# Patient Record
Sex: MALE | Race: WHITE | Employment: OTHER | URBAN - METROPOLITAN AREA
[De-identification: names, ages, dates, MRNs, and addresses within clinical notes are randomized per-mention and may not be internally consistent; named-entity substitution may affect disease eponyms.]

---

## 2017-03-24 ENCOUNTER — HOSPITAL ENCOUNTER (EMERGENCY)
Age: 32
Discharge: HOME | End: 2017-03-24
Attending: EMERGENCY MEDICINE | Admitting: EMERGENCY MEDICINE
Payer: SELF-PAY

## 2017-03-24 VITALS
WEIGHT: 170 LBS | HEART RATE: 75 BPM | OXYGEN SATURATION: 97 % | HEIGHT: 67 IN | BODY MASS INDEX: 26.68 KG/M2 | DIASTOLIC BLOOD PRESSURE: 81 MMHG | RESPIRATION RATE: 20 BRPM | SYSTOLIC BLOOD PRESSURE: 112 MMHG | TEMPERATURE: 99.8 F

## 2017-03-24 DIAGNOSIS — F19.91 HISTORY OF RECREATIONAL DRUG USE: ICD-10-CM

## 2017-03-24 DIAGNOSIS — T40.1X4A: Primary | ICD-10-CM

## 2017-03-24 LAB
ALBUMIN SERPL BCP-MCNC: 3.6 G/DL (ref 3.4–5)
ALBUMIN/GLOB SERPL: 1.1 {RATIO} (ref 0.8–1.7)
ALP SERPL-CCNC: 61 U/L (ref 45–117)
ALT SERPL-CCNC: 90 U/L (ref 16–61)
ANION GAP BLD CALC-SCNC: 10 MMOL/L (ref 3–18)
APAP SERPL-MCNC: <2 UG/ML (ref 10–30)
AST SERPL W P-5'-P-CCNC: 38 U/L (ref 15–37)
BASOPHILS # BLD AUTO: 0 K/UL (ref 0–0.06)
BASOPHILS # BLD: 0 % (ref 0–2)
BILIRUB SERPL-MCNC: 0.4 MG/DL (ref 0.2–1)
BUN SERPL-MCNC: 17 MG/DL (ref 7–18)
BUN/CREAT SERPL: 17 (ref 12–20)
CALCIUM SERPL-MCNC: 8.2 MG/DL (ref 8.5–10.1)
CHLORIDE SERPL-SCNC: 106 MMOL/L (ref 100–108)
CK MB CFR SERPL CALC: NORMAL % (ref 0–4)
CK MB SERPL-MCNC: <1 NG/ML (ref 5–25)
CK SERPL-CCNC: 78 U/L (ref 39–308)
CO2 SERPL-SCNC: 27 MMOL/L (ref 21–32)
CREAT SERPL-MCNC: 1 MG/DL (ref 0.6–1.3)
DIFFERENTIAL METHOD BLD: ABNORMAL
EOSINOPHIL # BLD: 0.1 K/UL (ref 0–0.4)
EOSINOPHIL NFR BLD: 1 % (ref 0–5)
ERYTHROCYTE [DISTWIDTH] IN BLOOD BY AUTOMATED COUNT: 12.7 % (ref 11.6–14.5)
ETHANOL SERPL-MCNC: <3 MG/DL (ref 0–3)
GLOBULIN SER CALC-MCNC: 3.4 G/DL (ref 2–4)
GLUCOSE SERPL-MCNC: 102 MG/DL (ref 74–99)
HCT VFR BLD AUTO: 37 % (ref 36–48)
HGB BLD-MCNC: 12.5 G/DL (ref 13–16)
LIPASE SERPL-CCNC: 76 U/L (ref 73–393)
LYMPHOCYTES # BLD AUTO: 32 % (ref 21–52)
LYMPHOCYTES # BLD: 1.3 K/UL (ref 0.9–3.6)
MCH RBC QN AUTO: 28.7 PG (ref 24–34)
MCHC RBC AUTO-ENTMCNC: 33.8 G/DL (ref 31–37)
MCV RBC AUTO: 85.1 FL (ref 74–97)
MONOCYTES # BLD: 0.4 K/UL (ref 0.05–1.2)
MONOCYTES NFR BLD AUTO: 9 % (ref 3–10)
NEUTS SEG # BLD: 2.4 K/UL (ref 1.8–8)
NEUTS SEG NFR BLD AUTO: 58 % (ref 40–73)
PLATELET # BLD AUTO: 181 K/UL (ref 135–420)
PMV BLD AUTO: 10 FL (ref 9.2–11.8)
POTASSIUM SERPL-SCNC: 3.5 MMOL/L (ref 3.5–5.5)
PROT SERPL-MCNC: 7 G/DL (ref 6.4–8.2)
RBC # BLD AUTO: 4.35 M/UL (ref 4.7–5.5)
SALICYLATES SERPL-MCNC: <2.8 MG/DL (ref 2.8–20)
SODIUM SERPL-SCNC: 143 MMOL/L (ref 136–145)
TROPONIN I SERPL-MCNC: <0.02 NG/ML (ref 0–0.06)
WBC # BLD AUTO: 4.2 K/UL (ref 4.6–13.2)

## 2017-03-24 PROCEDURE — 82550 ASSAY OF CK (CPK): CPT | Performed by: PHYSICIAN ASSISTANT

## 2017-03-24 PROCEDURE — 85025 COMPLETE CBC W/AUTO DIFF WBC: CPT | Performed by: PHYSICIAN ASSISTANT

## 2017-03-24 PROCEDURE — 80307 DRUG TEST PRSMV CHEM ANLYZR: CPT | Performed by: PHYSICIAN ASSISTANT

## 2017-03-24 PROCEDURE — 96374 THER/PROPH/DIAG INJ IV PUSH: CPT

## 2017-03-24 PROCEDURE — 80053 COMPREHEN METABOLIC PANEL: CPT | Performed by: PHYSICIAN ASSISTANT

## 2017-03-24 PROCEDURE — 74011250636 HC RX REV CODE- 250/636: Performed by: PHYSICIAN ASSISTANT

## 2017-03-24 PROCEDURE — 96361 HYDRATE IV INFUSION ADD-ON: CPT

## 2017-03-24 PROCEDURE — 93005 ELECTROCARDIOGRAM TRACING: CPT

## 2017-03-24 PROCEDURE — 99285 EMERGENCY DEPT VISIT HI MDM: CPT

## 2017-03-24 PROCEDURE — 83690 ASSAY OF LIPASE: CPT | Performed by: PHYSICIAN ASSISTANT

## 2017-03-24 RX ORDER — KETOROLAC TROMETHAMINE 30 MG/ML
30 INJECTION, SOLUTION INTRAMUSCULAR; INTRAVENOUS
Status: COMPLETED | OUTPATIENT
Start: 2017-03-24 | End: 2017-03-24

## 2017-03-24 RX ADMIN — KETOROLAC TROMETHAMINE 30 MG: 30 INJECTION, SOLUTION INTRAMUSCULAR at 18:54

## 2017-03-24 RX ADMIN — SODIUM CHLORIDE 1000 ML: 900 INJECTION, SOLUTION INTRAVENOUS at 17:40

## 2017-03-24 RX ADMIN — SODIUM CHLORIDE 1000 ML: 900 INJECTION, SOLUTION INTRAVENOUS at 18:54

## 2017-03-24 NOTE — ED NOTES
Received report and assumed care of patient. Pt resting in bed,  at bedside and remains on monitor x3.
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
## 2017-03-24 NOTE — DISCHARGE INSTRUCTIONS
Alcohol, Drug, or Poison Ingestion: Care Instructions  Your Care Instructions  A person can become very sick, or die, from swallowing or using alcohol, drugs, or poisons. Alcohol poisoning occurs when a person drinks a large amount of alcohol. Alcohol can stop nerve signals that control breathing. It can also stop the gag reflex that prevents choking. Alcohol poisoning is serious. It can lead to brain damage or death if it's not treated right away. Drugs can be used by accident or on purpose. They can be swallowed, inhaled, injected, or absorbed through the skin. Drugs include over-the-counter medicine (such as aspirin or acetaminophen) and prescription medicine. They also include vitamins and supplements. And they include illegal drugs such as cocaine and heroin. And poisons are all around us. They include household , cosmetics, houseplants, and garden chemicals. The doctor has checked you carefully, but problems can develop later. If you notice any problems or new symptoms, get medical treatment right away. Follow-up care is a key part of your treatment and safety. Be sure to make and go to all appointments, and call your doctor if you are having problems. It's also a good idea to know your test results and keep a list of the medicines you take. How can you care for yourself at home? Alcohol problems  · Talk to your doctor or counselor about programs that can help you stop using alcohol. · Plan ways to avoid being tempted to drink. ¨ Get rid of all alcohol in your home. ¨ Avoid places where you tend to drink. ¨ Stay away from places or events that offer alcohol. ¨ Stay away from people who drink a lot. Drug problems  · Talk to your doctor about programs that can help you stop using drugs. · Get rid of any drugs you might be tempted to misuse. · Learn how to say no when other people use drugs. · Don't spend time with people who use drugs.   Poison prevention  · Keep products in the containers they came in. Keep them with the original labels. · Be careful when you use cleaning products, paints, solvents, and pesticides. Read labels before use. Use a fan to move strong odors and fumes out of your home. · Do not mix cleaning products. Try to use nontoxic . These include vinegar, lemon juice, and baking soda. When should you call for help? Poison control centers, hospitals, or your doctor can give immediate advice in the case of a poisoning. The [employer] number is [phone]. Have the poison container with you so you can give complete information to the poison control center, such as what the poison or substance is, how much was taken and when. Do not try to make the person vomit. Call 911 anytime you think you may need emergency care. For example, call if you or someone else:  · Has used or currently uses alcohol or drugs and is very confused or can't stay awake. · Has passed out (lost consciousness). · Has severe trouble breathing. · Is having a seizure. Call your doctor now or seek immediate medical care if you or someone else:  · Has new symptoms, or is not acting normally. Watch closely for changes in your health, and be sure to contact your doctor if:  · You do not get better as expected. · You need help with drug or alcohol problems. · You have problems with depression or other mental health issues. Where can you learn more? Go to http://rosemary-yasmin.info/. Enter S160 in the search box to learn more about \"Alcohol, Drug, or Poison Ingestion: Care Instructions. \"  Current as of: May 27, 2016  Content Version: 11.1  © 1173-8970 [employer]. Care instructions adapted under license by [employer] (which disclaims liability or warranty for this information).  If you have questions about a medical condition or this instruction, always ask your healthcare professional. [employer] disclaims any warranty or liability for your use of this information.

## 2017-03-25 NOTE — ED NOTES
Claritza Michel was discharged in good and improved condition. The patient's diagnosis, condition and treatment were explained to patient and aftercare instructions were given. The patient verbalized good understanding. Patient armband removed and both labels and armband were placed in shred bin. Patient left ER ambulatory with steady gait in no acute distress. 0 prescriptions provided. Patient discharged to officer's custody.

## 2017-04-02 LAB
ATRIAL RATE: 73 BPM
CALCULATED P AXIS, ECG09: 75 DEGREES
CALCULATED R AXIS, ECG10: 56 DEGREES
CALCULATED T AXIS, ECG11: 48 DEGREES
DIAGNOSIS, 93000: NORMAL
P-R INTERVAL, ECG05: 156 MS
Q-T INTERVAL, ECG07: 384 MS
QRS DURATION, ECG06: 84 MS
QTC CALCULATION (BEZET), ECG08: 423 MS
VENTRICULAR RATE, ECG03: 73 BPM

## 2017-12-28 ENCOUNTER — HOSPITAL ENCOUNTER (EMERGENCY)
Age: 32
Discharge: HOME | End: 2017-12-28
Attending: EMERGENCY MEDICINE | Admitting: EMERGENCY MEDICINE
Payer: SELF-PAY

## 2017-12-28 ENCOUNTER — APPOINTMENT (OUTPATIENT)
Dept: GENERAL RADIOLOGY | Age: 32
End: 2017-12-28
Attending: EMERGENCY MEDICINE
Payer: SELF-PAY

## 2017-12-28 VITALS
WEIGHT: 175 LBS | DIASTOLIC BLOOD PRESSURE: 77 MMHG | TEMPERATURE: 98.3 F | OXYGEN SATURATION: 99 % | SYSTOLIC BLOOD PRESSURE: 124 MMHG | BODY MASS INDEX: 26.52 KG/M2 | HEART RATE: 88 BPM | RESPIRATION RATE: 18 BRPM | HEIGHT: 68 IN

## 2017-12-28 DIAGNOSIS — F14.10 COCAINE ABUSE (HCC): ICD-10-CM

## 2017-12-28 DIAGNOSIS — R07.9 CHEST PAIN, UNSPECIFIED TYPE: Primary | ICD-10-CM

## 2017-12-28 LAB
ALBUMIN SERPL-MCNC: 4.4 G/DL (ref 3.4–5)
ALBUMIN/GLOB SERPL: 1 {RATIO} (ref 0.8–1.7)
ALP SERPL-CCNC: 79 U/L (ref 45–117)
ALT SERPL-CCNC: 169 U/L (ref 16–61)
ANION GAP SERPL CALC-SCNC: 8 MMOL/L (ref 3–18)
AST SERPL-CCNC: 63 U/L (ref 15–37)
BASOPHILS # BLD: 0 K/UL (ref 0–0.06)
BASOPHILS NFR BLD: 0 % (ref 0–2)
BILIRUB SERPL-MCNC: 1.8 MG/DL (ref 0.2–1)
BUN SERPL-MCNC: 12 MG/DL (ref 7–18)
BUN/CREAT SERPL: 11 (ref 12–20)
CALCIUM SERPL-MCNC: 9.8 MG/DL (ref 8.5–10.1)
CHLORIDE SERPL-SCNC: 101 MMOL/L (ref 100–108)
CK MB CFR SERPL CALC: NORMAL % (ref 0–4)
CK MB SERPL-MCNC: <1 NG/ML (ref 5–25)
CK SERPL-CCNC: 131 U/L (ref 39–308)
CO2 SERPL-SCNC: 29 MMOL/L (ref 21–32)
CREAT SERPL-MCNC: 1.06 MG/DL (ref 0.6–1.3)
DIFFERENTIAL METHOD BLD: ABNORMAL
EOSINOPHIL # BLD: 0.1 K/UL (ref 0–0.4)
EOSINOPHIL NFR BLD: 1 % (ref 0–5)
ERYTHROCYTE [DISTWIDTH] IN BLOOD BY AUTOMATED COUNT: 13 % (ref 11.6–14.5)
GLOBULIN SER CALC-MCNC: 4.2 G/DL (ref 2–4)
GLUCOSE SERPL-MCNC: 115 MG/DL (ref 74–99)
HCT VFR BLD AUTO: 51.5 % (ref 36–48)
HGB BLD-MCNC: 18.1 G/DL (ref 13–16)
LYMPHOCYTES # BLD: 1.4 K/UL (ref 0.9–3.6)
LYMPHOCYTES NFR BLD: 25 % (ref 21–52)
MCH RBC QN AUTO: 30.6 PG (ref 24–34)
MCHC RBC AUTO-ENTMCNC: 35.1 G/DL (ref 31–37)
MCV RBC AUTO: 87 FL (ref 74–97)
MONOCYTES # BLD: 0.6 K/UL (ref 0.05–1.2)
MONOCYTES NFR BLD: 11 % (ref 3–10)
NEUTS SEG # BLD: 3.4 K/UL (ref 1.8–8)
NEUTS SEG NFR BLD: 63 % (ref 40–73)
PLATELET # BLD AUTO: 173 K/UL (ref 135–420)
PMV BLD AUTO: 10.6 FL (ref 9.2–11.8)
POTASSIUM SERPL-SCNC: 4.2 MMOL/L (ref 3.5–5.5)
PROT SERPL-MCNC: 8.6 G/DL (ref 6.4–8.2)
RBC # BLD AUTO: 5.92 M/UL (ref 4.7–5.5)
SODIUM SERPL-SCNC: 138 MMOL/L (ref 136–145)
TROPONIN I SERPL-MCNC: <0.02 NG/ML (ref 0–0.06)
WBC # BLD AUTO: 5.5 K/UL (ref 4.6–13.2)

## 2017-12-28 PROCEDURE — 80053 COMPREHEN METABOLIC PANEL: CPT | Performed by: EMERGENCY MEDICINE

## 2017-12-28 PROCEDURE — 85025 COMPLETE CBC W/AUTO DIFF WBC: CPT | Performed by: EMERGENCY MEDICINE

## 2017-12-28 PROCEDURE — 71010 XR CHEST PORT: CPT

## 2017-12-28 PROCEDURE — 94762 N-INVAS EAR/PLS OXIMTRY CONT: CPT

## 2017-12-28 PROCEDURE — 93005 ELECTROCARDIOGRAM TRACING: CPT

## 2017-12-28 PROCEDURE — 82550 ASSAY OF CK (CPK): CPT | Performed by: EMERGENCY MEDICINE

## 2017-12-28 PROCEDURE — 99284 EMERGENCY DEPT VISIT MOD MDM: CPT

## 2017-12-28 RX ORDER — KETOROLAC TROMETHAMINE 30 MG/ML
30 INJECTION, SOLUTION INTRAMUSCULAR; INTRAVENOUS ONCE
Status: DISCONTINUED | OUTPATIENT
Start: 2017-12-28 | End: 2017-12-28

## 2017-12-28 NOTE — ED TRIAGE NOTES
Patient c/o of chest pain starting today. Patient reports using cocaine last night. Patient reports having been in rehab for heroin use.

## 2017-12-28 NOTE — ED PROVIDER NOTES
EMERGENCY DEPARTMENT HISTORY AND PHYSICAL EXAM    Date: 12/28/2017  Patient Name: Lana Michel    History of Presenting Illness     Chief Complaint   Patient presents with    Chest Pain         History Provided By: Patient    Chief Complaint: chest pain  Duration: 2 months, with acuity last night   Timing:  Intermittent  Location: chest  Quality: Sharp  Severity: 5 out of 10   The pain is worse with deep breathing, and worse in mornings  Associated Symptoms: heightened anxiety, and flank pain after drinking instant coffee    Additional History (Context):   3:35 PM   Lana Michel is a 33 y.o. male with PMHX of cocaine use last night, and chronic musculoskeletal chest pains who presents to the emergency department C/O intermittent sharp chest pain (5/10) that lasted for 2 hours, and has come back stronger (onset 2 months ago, with acuity last night). The pain is worse with deep breathing, and worse in the morning. Associated sxs include heightened anxiety, and flank pain after drinking instant coffee. The pt admits to previous Cocaine use (last night), and previous Heroin use (7 months ago). The smokes Cigarettes, and uses etOH. Pt denies having previously seen a doctor for the chest pain, urinary sx, and any other sxs or complaints. PCP: Mee Esquivel MD        Past History     Past Medical History:  Past Medical History:   Diagnosis Date    Drug abuse, IV     Psychiatric diagnosis     ADHD    Psychiatric diagnosis     drug abuse.  Psychiatric disorder     anxiety       Past Surgical History:  History reviewed. No pertinent surgical history. Family History:  History reviewed. No pertinent family history.     Social History:  Social History   Substance Use Topics    Smoking status: Current Every Day Smoker     Packs/day: 1.00    Smokeless tobacco: None    Alcohol use No      Comment: reports no drinking x 6 months       Allergies:  No Known Allergies      Review of Systems   Review of Systems   Cardiovascular: Positive for chest pain. Genitourinary: Positive for flank pain (after instant coffee). Negative for dysuria, frequency and urgency. Psychiatric/Behavioral: The patient is nervous/anxious. All other systems reviewed and are negative. Physical Exam     Vitals:    12/28/17 1452   BP: 124/77   Pulse: 88   Resp: 18   Temp: 98.3 °F (36.8 °C)   SpO2: 99%   Weight: 79.4 kg (175 lb)   Height: 5' 8\" (1.727 m)     Physical Exam   Nursing note and vitals reviewed. Constitutional: Alert. Well appearing, no acute distress  Head: Normocephalic, Atraumatic  Eyes: Pupils are equal, round, and reactive to light, EOMI  ENT: Moist mucous membranes, oropharynx clear. Neck: Supple, non-tender  Cardiovascular: Regular rate and rhythm, no murmurs, rubs, or gallops  Chest: Normal work of breathing and chest excursion bilaterally. No reproducible chest tenderness. Lungs: Clear to ausculation bilaterally. Abdomen: Soft, non tender, non distended, normoactive bowel sounds  Back: No evidence of trauma or deformity. No CVA Tenderness.   Extremities: No evidence of trauma or deformity, no LE edema  Skin: Warm and dry  Neuro: Alert and appropriate, facial movement symmetric, normal speech, strength and sensation full and symmetric bilaterally, normal gait, normal coordination  Psychiatric: Moderately anxious appearing     Diagnostic Study Results     Labs -     Recent Results (from the past 12 hour(s))   CBC WITH AUTOMATED DIFF    Collection Time: 12/28/17  2:54 PM   Result Value Ref Range    WBC 5.5 4.6 - 13.2 K/uL    RBC 5.92 (H) 4.70 - 5.50 M/uL    HGB 18.1 (H) 13.0 - 16.0 g/dL    HCT 51.5 (H) 36.0 - 48.0 %    MCV 87.0 74.0 - 97.0 FL    MCH 30.6 24.0 - 34.0 PG    MCHC 35.1 31.0 - 37.0 g/dL    RDW 13.0 11.6 - 14.5 %    PLATELET 200 173 - 497 K/uL    MPV 10.6 9.2 - 11.8 FL    NEUTROPHILS 63 40 - 73 %    LYMPHOCYTES 25 21 - 52 %    MONOCYTES 11 (H) 3 - 10 %    EOSINOPHILS 1 0 - 5 % BASOPHILS 0 0 - 2 %    ABS. NEUTROPHILS 3.4 1.8 - 8.0 K/UL    ABS. LYMPHOCYTES 1.4 0.9 - 3.6 K/UL    ABS. MONOCYTES 0.6 0.05 - 1.2 K/UL    ABS. EOSINOPHILS 0.1 0.0 - 0.4 K/UL    ABS. BASOPHILS 0.0 0.0 - 0.06 K/UL    DF AUTOMATED     METABOLIC PANEL, COMPREHENSIVE    Collection Time: 12/28/17  2:54 PM   Result Value Ref Range    Sodium 138 136 - 145 mmol/L    Potassium 4.2 3.5 - 5.5 mmol/L    Chloride 101 100 - 108 mmol/L    CO2 29 21 - 32 mmol/L    Anion gap 8 3.0 - 18 mmol/L    Glucose 115 (H) 74 - 99 mg/dL    BUN 12 7.0 - 18 MG/DL    Creatinine 1.06 0.6 - 1.3 MG/DL    BUN/Creatinine ratio 11 (L) 12 - 20      GFR est AA >60 >60 ml/min/1.73m2    GFR est non-AA >60 >60 ml/min/1.73m2    Calcium 9.8 8.5 - 10.1 MG/DL    Bilirubin, total 1.8 (H) 0.2 - 1.0 MG/DL    ALT (SGPT) 169 (H) 16 - 61 U/L    AST (SGOT) 63 (H) 15 - 37 U/L    Alk. phosphatase 79 45 - 117 U/L    Protein, total 8.6 (H) 6.4 - 8.2 g/dL    Albumin 4.4 3.4 - 5.0 g/dL    Globulin 4.2 (H) 2.0 - 4.0 g/dL    A-G Ratio 1.0 0.8 - 1.7     CARDIAC PANEL,(CK, CKMB & TROPONIN)    Collection Time: 12/28/17  2:54 PM   Result Value Ref Range     39 - 308 U/L    CK - MB <1.0 <3.6 ng/ml    CK-MB Index  0.0 - 4.0 %     CALCULATION NOT PERFORMED WHEN RESULT IS BELOW LINEAR LIMIT    Troponin-I, Qt. <0.02 0.00 - 0.06 NG/ML       Radiologic Studies -   XR CHEST PORT   Final Result        CT Results  (Last 48 hours)    None        CXR Results  (Last 48 hours)               12/28/17 1522  XR CHEST PORT Final result    Impression:  IMPRESSION:       1. No acute cardiopulmonary process. Narrative:  Portable Chest         History: chest pain       Comparison: No prior studies       Portable view of the chest demonstrates the cardiomediastinal silhouette is   within normal limits. Cardiac monitoring leads are present. There is no focal   consolidation, pleural effusion, or pneumothorax. Osseous structures are   unremarkable. Medications given in the ED-  Medications   ketorolac (TORADOL) injection 30 mg (not administered)         Medical Decision Making   I am the first provider for this patient. I reviewed the vital signs, available nursing notes, past medical history, past surgical history, family history and social history. Vital Signs-Reviewed the patient's vital signs. Pulse Oximetry Analysis - 99% on room air     Cardiac Monitor:  Rate: 88 bpm  Rhythm: NSR    NSR at 73 bpm. No ST segment change or T wave inversion. Normal intervals. Read at 14:54        Records Reviewed: Nursing Notes and Old Medical Records    Provider Notes (Medical Decision Making): Discussion: 33year old male presents for acute exacerbation of chronic chest pain in the setting of cocaine abuse yesterday. His vital signs are stable, EKG is non ischemic with no evidence of arrhythmia and labs are reassuring of no acute process. Will provide instructions for musculoskeletal chest pain management, and encourage follow-up with [facility] clinic to set up a PCP as well as [facility] for help with Psychiatric management. Return precautions provided. Procedures:  Procedures    ED Course:   3:35 PM    Initial assessment performed. The patients presenting problems have been discussed, and they are in agreement with the care plan formulated and outlined with them. I have encouraged them to ask questions as they arise throughout their visit. Diagnosis and Disposition       DISCHARGE NOTE:  4:59 PM   Daquan Michel's  results have been reviewed with him. He has been counseled regarding his diagnosis, treatment, and plan. He verbally conveys understanding and agreement of the signs, symptoms, diagnosis, treatment and prognosis and additionally agrees to follow up as discussed. He also agrees with the care-plan and conveys that all of his questions have been answered.   I have also provided discharge instructions for him that include: educational information regarding their diagnosis and treatment, and list of reasons why they would want to return to the ED prior to their follow-up appointment, should his condition change. He has been provided with education for proper emergency department utilization. CLINICAL IMPRESSION:    1. Chest pain, unspecified type    2. Cocaine abuse        PLAN:  1. D/C Home  2. There are no discharge medications for this patient. 3.   Follow-up Information     Follow up With Details Comments Contact Info    [facility] Schedule an appointment as soon as possible for a visit in 2 days ED Follow-up with the [facility] as discussed with Dr. Javi Cheema  [address] [facility], [address] [address] [address] Call Call and Make an appointment with CAMIL[address], [address] [place] [address]    THE [facility] EMERGENCY DEPT Go to As needed, If symptoms worsen [address] [facility] [address]  [phone]        _______________________________    Attestations: This note is prepared by Rosa Martinez, acting as Scribe for Renae Dillard M.D.:  The scribe's documentation has been prepared under my direction and personally reviewed by me in its entirety.   I confirm that the note above accurately reflects all work, treatment, procedures, and medical decision making performed by me.  _______________________________

## 2017-12-28 NOTE — ED NOTES
Patient given & reviewed discharge instructions. Patient return verbal understanding. Armband discarded appropriately.

## 2017-12-28 NOTE — DISCHARGE INSTRUCTIONS
Chest Pain: Care Instructions  Your Care Instructions    There are many things that can cause chest pain. Some are not serious and will get better on their own in a few days. But some kinds of chest pain need more testing and treatment. Your doctor may have recommended a follow-up visit in the next 8 to 12 hours. If you are not getting better, you may need more tests or treatment. Even though your doctor has released you, you still need to watch for any problems. The doctor carefully checked you, but sometimes problems can develop later. If you have new symptoms or if your symptoms do not get better, get medical care right away. If you have worse or different chest pain or pressure that lasts more than 5 minutes or you passed out (lost consciousness), call 911 or seek other emergency help right away. A medical visit is only one step in your treatment. Even if you feel better, you still need to do what your doctor recommends, such as going to all suggested follow-up appointments and taking medicines exactly as directed. This will help you recover and help prevent future problems. How can you care for yourself at home? · Rest until you feel better. · Take your medicine exactly as prescribed. Call your doctor if you think you are having a problem with your medicine. · Do not drive after taking a prescription pain medicine. When should you call for help? Call 911 if:  ? · You passed out (lost consciousness). ? · You have severe difficulty breathing. ? · You have symptoms of a heart attack. These may include:  ¨ Chest pain or pressure, or a strange feeling in your chest.  ¨ Sweating. ¨ Shortness of breath. ¨ Nausea or vomiting. ¨ Pain, pressure, or a strange feeling in your back, neck, jaw, or upper belly or in one or both shoulders or arms. ¨ Lightheadedness or sudden weakness. ¨ A fast or irregular heartbeat.   After you call 911, the  may tell you to chew 1 adult-strength or 2 to 4 low-dose aspirin. Wait for an ambulance. Do not try to drive yourself. ?Call your doctor today if:  ? · You have any trouble breathing. ? · Your chest pain gets worse. ? · You are dizzy or lightheaded, or you feel like you may faint. ? · You are not getting better as expected. ? · You are having new or different chest pain. Where can you learn more? Go to http://rosemary-yasmin.info/. Enter A120 in the search box to learn more about \"Chest Pain: Care Instructions. \"  Current as of: March 20, 2017  Content Version: 11.4  © 4076-7377 [email or web address]. Care instructions adapted under license by [employer] (which disclaims liability or warranty for this information). If you have questions about a medical condition or this instruction, always ask your healthcare professional. [address] any warranty or liability for your use of this information.

## 2017-12-31 LAB
ATRIAL RATE: 73 BPM
CALCULATED P AXIS, ECG09: 10 DEGREES
CALCULATED R AXIS, ECG10: 84 DEGREES
CALCULATED T AXIS, ECG11: 60 DEGREES
DIAGNOSIS, 93000: NORMAL
P-R INTERVAL, ECG05: 132 MS
Q-T INTERVAL, ECG07: 380 MS
QRS DURATION, ECG06: 84 MS
QTC CALCULATION (BEZET), ECG08: 418 MS
VENTRICULAR RATE, ECG03: 73 BPM

## 2020-07-03 ENCOUNTER — HOSPITAL ENCOUNTER (EMERGENCY)
Age: 35
Discharge: LEFT BEFORE DISCHARGE | End: 2020-07-03
Attending: EMERGENCY MEDICINE
Payer: COMMERCIAL

## 2020-07-03 ENCOUNTER — APPOINTMENT (OUTPATIENT)
Dept: GENERAL RADIOLOGY | Age: 35
End: 2020-07-03
Attending: EMERGENCY MEDICINE
Payer: COMMERCIAL

## 2020-07-03 VITALS
DIASTOLIC BLOOD PRESSURE: 74 MMHG | HEIGHT: 68 IN | BODY MASS INDEX: 26.52 KG/M2 | TEMPERATURE: 98.4 F | HEART RATE: 100 BPM | WEIGHT: 175 LBS | OXYGEN SATURATION: 100 % | SYSTOLIC BLOOD PRESSURE: 138 MMHG | RESPIRATION RATE: 18 BRPM

## 2020-07-03 PROCEDURE — 75810000275 HC EMERGENCY DEPT VISIT NO LEVEL OF CARE

## 2020-07-03 NOTE — ED TRIAGE NOTES
Arrives ambulatory to the ed s/p fall on the stairs. Pt states he has had arm pain for the past three hours and feels a \"dent\" in his right forearm.

## 2020-08-03 ENCOUNTER — HOSPITAL ENCOUNTER (OUTPATIENT)
Dept: ULTRASOUND IMAGING | Age: 35
Discharge: HOME | End: 2020-08-03
Attending: INTERNAL MEDICINE
Payer: COMMERCIAL

## 2020-08-03 DIAGNOSIS — B18.2 HEPATITIS C CARRIER (HCC): ICD-10-CM

## 2020-08-03 PROCEDURE — 76981 USE PARENCHYMA: CPT

## 2020-11-09 ENCOUNTER — HOSPITAL ENCOUNTER (EMERGENCY)
Age: 35
Discharge: HOME | End: 2020-11-09
Attending: EMERGENCY MEDICINE
Payer: COMMERCIAL

## 2020-11-09 ENCOUNTER — APPOINTMENT (OUTPATIENT)
Dept: GENERAL RADIOLOGY | Age: 35
End: 2020-11-09
Attending: PHYSICIAN ASSISTANT
Payer: COMMERCIAL

## 2020-11-09 VITALS
WEIGHT: 165 LBS | HEART RATE: 63 BPM | BODY MASS INDEX: 25.01 KG/M2 | RESPIRATION RATE: 16 BRPM | TEMPERATURE: 97.8 F | SYSTOLIC BLOOD PRESSURE: 111 MMHG | DIASTOLIC BLOOD PRESSURE: 72 MMHG | HEIGHT: 68 IN | OXYGEN SATURATION: 100 %

## 2020-11-09 DIAGNOSIS — S61.210A LACERATION OF RIGHT INDEX FINGER WITHOUT FOREIGN BODY WITHOUT DAMAGE TO NAIL, INITIAL ENCOUNTER: Primary | ICD-10-CM

## 2020-11-09 PROCEDURE — 73140 X-RAY EXAM OF FINGER(S): CPT

## 2020-11-09 PROCEDURE — 77030002916 HC SUT ETHLN J&J -A

## 2020-11-09 PROCEDURE — 75810000293 HC SIMP/SUPERF WND  RPR

## 2020-11-09 PROCEDURE — 99283 EMERGENCY DEPT VISIT LOW MDM: CPT

## 2020-11-09 RX ORDER — DEXTROAMPHETAMINE SACCHARATE, AMPHETAMINE ASPARTATE MONOHYDRATE, DEXTROAMPHETAMINE SULFATE AND AMPHETAMINE SULFATE 5; 5; 5; 5 MG/1; MG/1; MG/1; MG/1
20 CAPSULE, EXTENDED RELEASE ORAL 3 TIMES DAILY
COMMUNITY

## 2020-11-09 NOTE — DISCHARGE INSTRUCTIONS

## 2020-11-09 NOTE — ED NOTES
Assumed care of patient. Patient currently soaking finger in sterile water. NAD. VSS. Bed low and locked. Call bell within reach. AOx4.

## 2020-11-09 NOTE — ED PROVIDER NOTES
EMERGENCY DEPARTMENT HISTORY AND PHYSICAL EXAM    Date: 11/9/2020  Patient Name: Cornelius Blum    History of Presenting Illness     Chief Complaint   Patient presents with    Laceration         History Provided By: Patient    Cornelius Blum is a 36 y.o. male with PMHX of ADHD, drug abuse, anxiety current cigarette smoker who presents to the emergency department C/O right index finger laceration. Reports just prior to arrival was using a  cutting drywall and sustained a laceration to his right index finger. This is up-to-date. Patient was brought back from triage brasher to main side ED room found to be very sweaty borderline hypotensive. Pt denies any other sxs or complaints. PCP: Roxie Pedro MD    Current Outpatient Medications   Medication Sig Dispense Refill    amphetamine-dextroamphetamine XR (Adderall XR) 20 mg XR capsule Take 20 mg by mouth three (3) times daily. Past History     Past Medical History:  Past Medical History:   Diagnosis Date    Drug abuse, IV ([address]     [facility]in [facility] [address] [facility]     Psychiatric diagnosis     ADHD    Psychiatric diagnosis     drug abuse.  Psychiatric disorder     anxiety       Past Surgical History:  History reviewed. No pertinent surgical history. Family History:  History reviewed. No pertinent family history. Social History:  Social History     Tobacco Use    Smoking status: Current Every Day Smoker     Packs/day: 1.00    Smokeless tobacco: Never Used   Substance Use Topics    Alcohol use: Yes     Comment: daily    Drug use: Yes     Types: Heroin, Prescription, Cocaine       Allergies:  No Known Allergies      Review of Systems   Review of Systems   Musculoskeletal: Positive for arthralgias. Skin: Positive for wound. All other systems reviewed and are negative.       Physical Exam     Vitals:    11/09/20 1434 11/09/20 1500 11/09/20 1508   BP: (!) 109/53 124/70    Pulse: 63     Resp: 16     Temp: 97.8 °F (36.6 °C)     SpO2: 100% 100% 100%   Weight: 74.8 kg (165 lb)     Height: 5' 8\" (1.727 m)       Physical Exam  Vitals signs and nursing note reviewed. Constitutional:       General: He is not in acute distress. Appearance: Normal appearance. He is normal weight. HENT:      Head: Normocephalic and atraumatic. Eyes:      Extraocular Movements: Extraocular movements intact. Conjunctiva/sclera: Conjunctivae normal.      Pupils: Pupils are equal, round, and reactive to light. Neck:      Musculoskeletal: Normal range of motion and neck supple. Musculoskeletal: Normal range of motion. General: Tenderness and signs of injury present. Right wrist: Normal.      Right hand: He exhibits laceration. He exhibits no bony tenderness and no deformity. Normal sensation noted. Normal strength noted. Hands:       Comments: 2 cm V-shaped laceration distal right index finger bleeding controlled, does not appear to be deep, full range of motion, neurovascular intact, no nail involvement   Skin:     General: Skin is warm and dry. Capillary Refill: Capillary refill takes less than 2 seconds. Neurological:      General: No focal deficit present. Mental Status: He is alert and oriented to person, place, and time. Psychiatric:         Mood and Affect: Mood normal.         Behavior: Behavior normal.           Diagnostic Study Results     Labs -   No results found for this or any previous visit (from the past 12 hour(s)). Radiologic Studies -   XR 2ND FINGER RT MIN 2 V   Final Result   IMPRESSION:      Soft tissue injury to the right hand index finger as described without evidence   of fracture or retained radiopaque foreign object. CT Results  (Last 48 hours)    None        CXR Results  (Last 48 hours)    None          Medications given in the ED-  Medications - No data to display      Medical Decision Making   I am the first provider for this patient.     I reviewed the vital signs, available nursing notes, past medical history, past surgical history, family history and social history. Vital Signs-Reviewed the patient's vital signs. Pulse Oximetry Analysis - 100% on RA     Records Reviewed: Nursing Notes    Procedures:  Wound Repair    Date/Time: 11/9/2020 3:22 PM  Performed by: PAPreparation: skin prepped with Shur-Clens and sterile field established  Pre-procedure re-eval: Immediately prior to the procedure, the patient was reevaluated and found suitable for the planned procedure and any planned medications. Time out: Immediately prior to the procedure a time out was called to verify the correct patient, procedure, equipment, staff and marking as appropriate. .  Location details: right index finger  Wound length:2.5 cm or less  Anesthesia: local infiltration    Anesthesia:  Local Anesthetic: lidocaine 2% without epinephrine  Foreign bodies: no foreign bodies  Debridement: none  Skin closure: 5-0 nylon  Number of sutures: 3  Technique: simple and interrupted  Patient tolerance: Patient tolerated the procedure well with no immediate complications  My total time at bedside, performing this procedure was 1-15 minutes. ED Course:   2:32 PM   Initial assessment performed. The patients presenting problems have been discussed, and they are in agreement with the care plan formulated and outlined with them. I have encouraged them to ask questions as they arise throughout their visit. Discussion: 36 y.o. male with uncomplicated right index finger laceration from a  while cutting drywall just prior to arrival.  Neurovascular intact appropriate vital signs negative x-rays. Wound cleansed repaired dressed. Wound care instructions given PCP follow-up for suture removal.  Return precautions discussed. Diagnosis and Disposition       DISCHARGE NOTE:  Rosemary Benoit  results have been reviewed with him.   He has been counseled regarding his diagnosis, treatment, and plan. He verbally conveys understanding and agreement of the signs, symptoms, diagnosis, treatment and prognosis and additionally agrees to follow up as discussed. He also agrees with the care-plan and conveys that all of his questions have been answered. I have also provided discharge instructions for him that include: educational information regarding their diagnosis and treatment, and list of reasons why they would want to return to the ED prior to their follow-up appointment, should his condition change. He has been provided with education for proper emergency department utilization. CLINICAL IMPRESSION:    1. Laceration of right index finger without foreign body without damage to nail, initial encounter        PLAN:  1. D/C Home  2. Current Discharge Medication List        3. Follow-up Information     Follow up With Specialties Details Why Contact Info    Riccardo Villa MD Internal Medicine In 9 days For suture removal [address] [facility] [address]      [facility] EMERGENCY DEPT Emergency Medicine  As needed, If symptoms worsen [address] [address] [phone]                  Please note that this dictation was completed with [employer], the computer voice recognition software. Quite often unanticipated grammatical, syntax, homophones, and other interpretive errors are inadvertently transcribed by the computer software. Please disregard these errors. Please excuse any errors that have escaped final proofreading.

## 2021-06-25 ENCOUNTER — APPOINTMENT (OUTPATIENT)
Dept: CT IMAGING | Age: 36
End: 2021-06-25
Attending: PHYSICIAN ASSISTANT
Payer: COMMERCIAL

## 2021-06-25 ENCOUNTER — HOSPITAL ENCOUNTER (EMERGENCY)
Age: 36
Discharge: HOME | End: 2021-06-25
Attending: EMERGENCY MEDICINE
Payer: COMMERCIAL

## 2021-06-25 VITALS
SYSTOLIC BLOOD PRESSURE: 145 MMHG | BODY MASS INDEX: 27.58 KG/M2 | HEIGHT: 68 IN | RESPIRATION RATE: 20 BRPM | OXYGEN SATURATION: 99 % | WEIGHT: 182 LBS | HEART RATE: 80 BPM | DIASTOLIC BLOOD PRESSURE: 110 MMHG | TEMPERATURE: 97.5 F

## 2021-06-25 DIAGNOSIS — R10.2 PELVIC AND PERINEAL PAIN: Primary | ICD-10-CM

## 2021-06-25 LAB
ANION GAP SERPL CALC-SCNC: 5 MMOL/L (ref 3–18)
APPEARANCE UR: CLEAR
BASOPHILS # BLD: 0 K/UL (ref 0–0.1)
BASOPHILS NFR BLD: 1 % (ref 0–2)
BILIRUB UR QL: NEGATIVE
BUN SERPL-MCNC: 14 MG/DL (ref 7–18)
BUN/CREAT SERPL: 18 (ref 12–20)
CALCIUM SERPL-MCNC: 8.7 MG/DL (ref 8.5–10.1)
CHLORIDE SERPL-SCNC: 106 MMOL/L (ref 100–111)
CO2 SERPL-SCNC: 29 MMOL/L (ref 21–32)
COLOR UR: YELLOW
CREAT SERPL-MCNC: 0.79 MG/DL (ref 0.6–1.3)
DIFFERENTIAL METHOD BLD: ABNORMAL
EOSINOPHIL # BLD: 0.1 K/UL (ref 0–0.4)
EOSINOPHIL NFR BLD: 2 % (ref 0–5)
ERYTHROCYTE [DISTWIDTH] IN BLOOD BY AUTOMATED COUNT: 10.9 % (ref 11.6–14.5)
GLUCOSE SERPL-MCNC: 96 MG/DL (ref 74–99)
GLUCOSE UR STRIP.AUTO-MCNC: NEGATIVE MG/DL
HCT VFR BLD AUTO: 44 % (ref 36–48)
HGB BLD-MCNC: 14.8 G/DL (ref 13–16)
HGB UR QL STRIP: NEGATIVE
KETONES UR QL STRIP.AUTO: NEGATIVE MG/DL
LEUKOCYTE ESTERASE UR QL STRIP.AUTO: NEGATIVE
LYMPHOCYTES # BLD: 1 K/UL (ref 0.9–3.6)
LYMPHOCYTES NFR BLD: 23 % (ref 21–52)
MCH RBC QN AUTO: 30.3 PG (ref 24–34)
MCHC RBC AUTO-ENTMCNC: 33.6 G/DL (ref 31–37)
MCV RBC AUTO: 90 FL (ref 74–97)
MONOCYTES # BLD: 0.4 K/UL (ref 0.05–1.2)
MONOCYTES NFR BLD: 10 % (ref 3–10)
NEUTS SEG # BLD: 2.8 K/UL (ref 1.8–8)
NEUTS SEG NFR BLD: 65 % (ref 40–73)
NITRITE UR QL STRIP.AUTO: NEGATIVE
PH UR STRIP: 8 [PH] (ref 5–8)
PLATELET # BLD AUTO: 180 K/UL (ref 135–420)
PMV BLD AUTO: 10.2 FL (ref 9.2–11.8)
POTASSIUM SERPL-SCNC: 4.3 MMOL/L (ref 3.5–5.5)
PROT UR STRIP-MCNC: NEGATIVE MG/DL
RBC # BLD AUTO: 4.89 M/UL (ref 4.35–5.65)
SODIUM SERPL-SCNC: 140 MMOL/L (ref 136–145)
SP GR UR REFRACTOMETRY: 1.02 (ref 1–1.03)
UROBILINOGEN UR QL STRIP.AUTO: 1 EU/DL (ref 0.2–1)
WBC # BLD AUTO: 4.4 K/UL (ref 4.6–13.2)

## 2021-06-25 PROCEDURE — 85025 COMPLETE CBC W/AUTO DIFF WBC: CPT

## 2021-06-25 PROCEDURE — 80048 BASIC METABOLIC PNL TOTAL CA: CPT

## 2021-06-25 PROCEDURE — 99283 EMERGENCY DEPT VISIT LOW MDM: CPT

## 2021-06-25 PROCEDURE — 81003 URINALYSIS AUTO W/O SCOPE: CPT

## 2021-06-25 PROCEDURE — 87591 N.GONORRHOEAE DNA AMP PROB: CPT

## 2021-06-25 PROCEDURE — 74011000636 HC RX REV CODE- 636: Performed by: EMERGENCY MEDICINE

## 2021-06-25 PROCEDURE — 74177 CT ABD & PELVIS W/CONTRAST: CPT

## 2021-06-25 RX ADMIN — IOPAMIDOL 100 ML: 612 INJECTION, SOLUTION INTRAVENOUS at 15:57

## 2021-06-25 NOTE — ED TRIAGE NOTES
Patient states that he has always had a pain in his perineal area, however today he had a severe pain there that cause him to break out in a sweat

## 2021-06-25 NOTE — LETTER
[facility] [place]  [facility]  [address] [facility] [id number] [phone]    Work/School Note    Date: 6/25/2021    To Whom It May concern:    Mari Ramachandran was seen and treated today in the emergency room by the following provider(s):  Attending Provider: Yue Ingram MD  Physician Assistant: CAMILLA Zamora.       Mari Ramachandran may return to work on 06/28/2021    Sincerely,           Yue Ingram MD

## 2021-06-25 NOTE — ED PROVIDER NOTES
EMERGENCY DEPARTMENT HISTORY AND PHYSICAL EXAM    Date: 6/25/2021  Patient Name: Kamlesh Kamara    History of Presenting Illness     No chief complaint on file. History Provided By: Patient    2:15 PM  Kamlesh Kamara is a 30 y.o. male with PMHX of DM, anxiety, substance abuse who presents to the emergency department C/O intermittent perineal pain x1 year, worsened today. Patient states he was at home lying in bed when he suddenly felt sharp severe pain in his perineal area which made him nauseous and break out into a sweat. He also adds that for the past month he has had intermittent perineal pain with BMs or urination as well as slight urinary frequency. He has also noticed intermittent blood on the toilet paper when he wipes after a hard BM but attributed that to occasional constipation and straining. Patient states he was released from a 4-month incarceration approximately 2.5 weeks ago and started doing tile work but denies any injury or trauma. Patient denies any recent new sexual partners or anal intercourse. Pt denies fever, chills, abdominal pain, testicle pain or swelling, and any other sxs or complaints. PCP: Addi Lara MD    Current Outpatient Medications   Medication Sig Dispense Refill    amphetamine-dextroamphetamine XR (Adderall XR) 20 mg XR capsule Take 20 mg by mouth three (3) times daily. Past History     Past Medical History:  Past Medical History:   Diagnosis Date    [facility] ([facility]     Heroin abuse ([facility]     Psychiatric diagnosis     ADHD    Psychiatric diagnosis     drug abuse.  Psychiatric disorder     anxiety       Past Surgical History:  No past surgical history on file. Family History:  History reviewed. No pertinent family history. Social History:  Social History     Tobacco Use    Smoking status: Current Every Day Smoker     Packs/day: 1.00    Smokeless tobacco: Never Used   Substance Use Topics    Alcohol use:  Yes Comment: daily    Drug use: Yes     Types: Heroin, Prescription, Cocaine       Allergies:  No Known Allergies      Review of Systems   Review of Systems   Constitutional: Negative for fever. Gastrointestinal: Positive for blood in stool. Negative for abdominal pain, diarrhea, nausea and vomiting. Genitourinary: Positive for frequency. Negative for dysuria and testicular pain. All other systems reviewed and are negative. Physical Exam     Vitals:    06/25/21 1403   BP: (!) 145/110   Pulse: 80   Resp: 20   Temp: 97.5 °F (36.4 °C)   SpO2: 99%   Weight: 82.6 kg (182 lb)   Height: 5' 8\" (1.727 m)     Physical Exam  Vital signs and nursing notes reviewed. CONSTITUTIONAL: Alert. Well-appearing; well-nourished; in no apparent distress. CV: Normal S1, S2; no murmurs, rubs, or gallops. No chest wall tenderness. RESPIRATORY: Normal chest excursion with respiration; breath sounds clear and equal bilaterally; no wheezes, rhonchi, or rales. GI: Normal bowel sounds; non-distended; non-tender; no guarding or rigidity; no palpable organomegaly. No CVA tenderness. : Normal appearing circumcised penis without rash or discharge. Testicles nontender without scrotal swelling or masses. No inguinal adenopathy or masses. No perineal rash tenderness. Skin is normal.  Chaperoned rectal exam reveals a smooth nontender prostate. BACK:  No evidence of trauma or deformity. Non-tender to palpation. FROM without difficulty. EXT: Normal ROM in all four extremities; non-tender to palpation. SKIN: Normal for age and race; warm; dry; good turgor; no apparent lesions or exudate. NEURO: A & O x3. PSYCH:  Mood and affect appropriate.            Diagnostic Study Results     Labs -     Recent Results (from the past 12 hour(s))   URINALYSIS W/ RFLX MICROSCOPIC    Collection Time: 06/25/21  3:00 PM   Result Value Ref Range    Color YELLOW      Appearance CLEAR      Specific gravity 1.023 1.005 - 1.030      pH (UA) 8.0 5.0 - 8.0      Protein Negative NEG mg/dL    Glucose Negative NEG mg/dL    Ketone Negative NEG mg/dL    Bilirubin Negative NEG      Blood Negative NEG      Urobilinogen 1.0 0.2 - 1.0 EU/dL    Nitrites Negative NEG      Leukocyte Esterase Negative NEG         Radiologic Studies -   CT ABD PELV W CONT   Final Result      No acute intra-abdominal abnormality. CT Results  (Last 48 hours)               06/25/21 1610  CT ABD PELV W CONT Final result    Impression:      No acute intra-abdominal abnormality. Narrative:  EXAM: CT of the abdomen and pelvis       INDICATION: Pain. COMPARISON: None. TECHNIQUE: Axial CT imaging of the abdomen and pelvis was performed with   intravenous contrast. Multiplanar reformats were generated. One or more dose reduction techniques were used on this CT: automated exposure   control, adjustment of the mAs and/or kVp according to patient size, and   iterative reconstruction techniques. The specific techniques used on this CT   exam have been documented in the patient's electronic medical record. Digital   Imaging and Communications in Medicine (DICOM) format image data are available   to nonaffiliated external healthcare facilities or entities on a secure, media   free, reciprocally searchable basis with patient authorization for at least a   12-month period after this study. _______________       FINDINGS:       LOWER CHEST: Unremarkable. LIVER, BILIARY: Liver is normal. No biliary dilation. Gallbladder is   unremarkable. PANCREAS: Normal.       SPLEEN: Normal.       ADRENALS: Normal.       KIDNEYS/URETERS/BLADDER: No calcification, hydronephrosis, or soft tissue   attenuation renal mass. PELVIC ORGANS: Unremarkable. VASCULATURE: Unremarkable       LYMPH NODES: No enlarged lymph nodes. GASTROINTESTINAL TRACT: No bowel dilation or wall thickening. Normal appendix.        BONES: No acute or aggressive osseous abnormalities identified. OTHER: None.       _______________               CXR Results  (Last 48 hours)    None          Medications given in the ED-  Medications   iopamidoL (ISOVUE 300) 61 % contrast injection 100 mL (100 mL IntraVENous Given 6/25/21 6898)         Medical Decision Making   I am the first provider for this patient. I reviewed the vital signs, available nursing notes, past medical history, past surgical history, family history and social history. Vital Signs-Reviewed the patient's vital signs. Records Reviewed: Nursing Notes      Procedures:  Procedures    ED Course:  2:15 PM   Initial assessment performed. The patients presenting problems have been discussed, and they are in agreement with the care plan formulated and outlined with them. I have encouraged them to ask questions as they arise throughout their visit. Provider Notes (Medical Decision Making): Peterson Sierra is a 30 y.o. male with intermittent perineal pain and pressure for the past year, had a transient sharp pain today with associated sweats and nausea that has since resolved. Nontender prostate and  exam.  Labs CT abdomen pelvis showed no acute abnormalities. May be related to rectal o pelvic floor spasm, no signs of infection, prostatitis, testicular pathology or torsion. Recommend ibuprofen and PCP follow-up. Diagnosis and Disposition       DISCHARGE NOTE:    Peterson Sierra  results have been reviewed with him. He has been counseled regarding his diagnosis, treatment, and plan. He verbally conveys understanding and agreement of the signs, symptoms, diagnosis, treatment and prognosis and additionally agrees to follow up as discussed. He also agrees with the care-plan and conveys that all of his questions have been answered.   I have also provided discharge instructions for him that include: educational information regarding their diagnosis and treatment, and list of reasons why they would want to return to the ED prior to their follow-up appointment, should his condition change. He has been provided with education for proper emergency department utilization. CLINICAL IMPRESSION:    1. Pelvic and perineal pain        PLAN:  1. D/C Home  2. Discharge Medication List as of 6/25/2021  4:55 PM        3. Follow-up Information     Follow up With Specialties Details Why Contact Info    Lei Sanz MD Internal Medicine Schedule an appointment as soon as possible for a visit   [address] [facility] [place] [facility] [address] [place]      [facility] EMERGENCY DEPT Emergency Medicine  As needed, If symptoms worsen [address] [address]  [phone]        _______________________________      Please note that this dictation was completed with [employer], the computer voice recognition software. Quite often unanticipated grammatical, syntax, homophones, and other interpretive errors are inadvertently transcribed by the computer software. Please disregard these errors. Please excuse any errors that have escaped final proofreading.

## 2021-06-29 LAB
C TRACH RRNA SPEC QL NAA+PROBE: NEGATIVE
N GONORRHOEA RRNA SPEC QL NAA+PROBE: NEGATIVE
SPECIMEN SOURCE: NORMAL
T VAGINALIS RRNA VAG QL NAA+PROBE: NEGATIVE

## 2021-07-21 ENCOUNTER — APPOINTMENT (OUTPATIENT)
Dept: GENERAL RADIOLOGY | Age: 36
DRG: 917 | End: 2021-07-21
Attending: EMERGENCY MEDICINE
Payer: COMMERCIAL

## 2021-07-21 ENCOUNTER — APPOINTMENT (OUTPATIENT)
Dept: CT IMAGING | Age: 36
DRG: 917 | End: 2021-07-21
Attending: EMERGENCY MEDICINE
Payer: COMMERCIAL

## 2021-07-21 ENCOUNTER — APPOINTMENT (OUTPATIENT)
Dept: NON INVASIVE DIAGNOSTICS | Age: 36
DRG: 917 | End: 2021-07-21
Attending: HOSPITALIST
Payer: COMMERCIAL

## 2021-07-21 ENCOUNTER — HOSPITAL ENCOUNTER (INPATIENT)
Age: 36
LOS: 6 days | Discharge: HOME | DRG: 917 | End: 2021-07-27
Attending: EMERGENCY MEDICINE | Admitting: HOSPITALIST
Payer: COMMERCIAL

## 2021-07-21 DIAGNOSIS — J96.01 ACUTE RESPIRATORY FAILURE WITH HYPOXIA AND HYPERCAPNIA (HCC): ICD-10-CM

## 2021-07-21 DIAGNOSIS — F19.10 POLYSUBSTANCE ABUSE (HCC): Primary | ICD-10-CM

## 2021-07-21 DIAGNOSIS — T50.904A DRUG OVERDOSE, UNDETERMINED INTENT, INITIAL ENCOUNTER: ICD-10-CM

## 2021-07-21 DIAGNOSIS — R94.39 ABNORMAL STRESS TEST: ICD-10-CM

## 2021-07-21 DIAGNOSIS — J96.02 ACUTE RESPIRATORY FAILURE WITH HYPOXIA AND HYPERCAPNIA (HCC): ICD-10-CM

## 2021-07-21 PROBLEM — B19.20 HEPATITIS-C: Status: ACTIVE | Noted: 2021-07-21

## 2021-07-21 PROBLEM — J69.0 ASPIRATION PNEUMONIA (HCC): Status: ACTIVE | Noted: 2021-07-21

## 2021-07-21 PROBLEM — J80 ARDS (ADULT RESPIRATORY DISTRESS SYNDROME) (HCC): Status: ACTIVE | Noted: 2021-07-21

## 2021-07-21 PROBLEM — F10.10 ALCOHOL ABUSE: Status: ACTIVE | Noted: 2021-07-21

## 2021-07-21 LAB
ALBUMIN SERPL-MCNC: 4.4 G/DL (ref 3.4–5)
ALBUMIN/GLOB SERPL: 1.3 {RATIO} (ref 0.8–1.7)
ALP SERPL-CCNC: 117 U/L (ref 45–117)
ALT SERPL-CCNC: 143 U/L (ref 16–61)
AMPHET UR QL SCN: POSITIVE
ANION GAP SERPL CALC-SCNC: 16 MMOL/L (ref 3–18)
APAP SERPL-MCNC: <2 UG/ML (ref 10–30)
APPEARANCE UR: CLEAR
APTT PPP: 29.6 SEC (ref 23–36.4)
ARTERIAL PATENCY WRIST A: POSITIVE
AST SERPL-CCNC: 187 U/L (ref 10–38)
ATRIAL RATE: 130 BPM
BARBITURATES UR QL SCN: NEGATIVE
BASE DEFICIT BLD-SCNC: 10.7 MMOL/L
BASE DEFICIT BLD-SCNC: 3.3 MMOL/L
BASE DEFICIT BLD-SCNC: 3.8 MMOL/L
BASOPHILS # BLD: 0 K/UL (ref 0–0.1)
BASOPHILS NFR BLD: 0 % (ref 0–2)
BDY SITE: ABNORMAL
BENZODIAZ UR QL: NEGATIVE
BILIRUB SERPL-MCNC: 0.6 MG/DL (ref 0.2–1)
BILIRUB UR QL: NEGATIVE
BNP SERPL-MCNC: 13 PG/ML (ref 0–450)
BUN SERPL-MCNC: 20 MG/DL (ref 7–18)
BUN/CREAT SERPL: 11 (ref 12–20)
CALCIUM SERPL-MCNC: 9.4 MG/DL (ref 8.5–10.1)
CALCULATED P AXIS, ECG09: 77 DEGREES
CALCULATED R AXIS, ECG10: 90 DEGREES
CALCULATED T AXIS, ECG11: 60 DEGREES
CANNABINOIDS UR QL SCN: NEGATIVE
CHLORIDE SERPL-SCNC: 99 MMOL/L (ref 100–111)
CK MB CFR SERPL CALC: 0.6 % (ref 0–4)
CK MB CFR SERPL CALC: 0.7 % (ref 0–4)
CK MB CFR SERPL CALC: 0.8 % (ref 0–4)
CK MB SERPL-MCNC: 2.4 NG/ML (ref 5–25)
CK MB SERPL-MCNC: 3.4 NG/ML (ref 5–25)
CK MB SERPL-MCNC: 4.1 NG/ML (ref 5–25)
CK SERPL-CCNC: 374 U/L (ref 39–308)
CK SERPL-CCNC: 461 U/L (ref 39–308)
CK SERPL-CCNC: 535 U/L (ref 39–308)
CO2 SERPL-SCNC: 24 MMOL/L (ref 21–32)
COCAINE UR QL SCN: POSITIVE
COLOR UR: YELLOW
COVID-19 RAPID TEST, COVR: NOT DETECTED
CREAT SERPL-MCNC: 1.8 MG/DL (ref 0.6–1.3)
DIAGNOSIS, 93000: NORMAL
DIFFERENTIAL METHOD BLD: ABNORMAL
ECHO AO ROOT DIAM: 3.44 CM
ECHO AV AREA PEAK VELOCITY: 2.95 CM2
ECHO AV AREA VTI: 2.97 CM2
ECHO AV AREA/BSA PEAK VELOCITY: 1.5 CM2/M2
ECHO AV AREA/BSA VTI: 1.5 CM2/M2
ECHO AV MEAN GRADIENT: 1.25 MMHG
ECHO AV PEAK GRADIENT: 1.78 MMHG
ECHO AV PEAK VELOCITY: 66.74 CM/S
ECHO AV VTI: 9.84 CM
ECHO IVC PROX: 1.84 CM
ECHO LA VOL 2C: 28.23 ML (ref 18–58)
ECHO LA VOL 4C: 23.33 ML (ref 18–58)
ECHO LA VOL BP: 30.1 ML (ref 18–58)
ECHO LA VOL/BSA BIPLANE: 15.44 ML/M2 (ref 16–28)
ECHO LA VOLUME INDEX A2C: 14.48 ML/M2 (ref 16–28)
ECHO LA VOLUME INDEX A4C: 11.96 ML/M2 (ref 16–28)
ECHO LV E' LATERAL VELOCITY: 5 CM/S
ECHO LV E' SEPTAL VELOCITY: 4 CM/S
ECHO LV EDV A2C: 58.68 ML
ECHO LV EDV A4C: 85.68 ML
ECHO LV EDV BP: 74.17 ML (ref 67–155)
ECHO LV EDV INDEX A4C: 43.9 ML/M2
ECHO LV EDV INDEX BP: 38 ML/M2
ECHO LV EDV NDEX A2C: 30.1 ML/M2
ECHO LV EJECTION FRACTION A2C: 17 PERCENT
ECHO LV EJECTION FRACTION A4C: 10 PERCENT
ECHO LV EJECTION FRACTION BIPLANE: 17.4 PERCENT (ref 55–100)
ECHO LV ESV A2C: 48.8 ML
ECHO LV ESV A4C: 76.86 ML
ECHO LV ESV BP: 61.28 ML (ref 22–58)
ECHO LV ESV INDEX A2C: 25 ML/M2
ECHO LV ESV INDEX A4C: 39.4 ML/M2
ECHO LV ESV INDEX BP: 31.4 ML/M2
ECHO LV INTERNAL DIMENSION DIASTOLIC: 4.44 CM (ref 4.2–5.9)
ECHO LV INTERNAL DIMENSION SYSTOLIC: 3.83 CM
ECHO LV IVSD: 0.73 CM (ref 0.6–1)
ECHO LV MASS 2D: 115.7 G (ref 88–224)
ECHO LV MASS INDEX 2D: 59.3 G/M2 (ref 49–115)
ECHO LV POSTERIOR WALL DIASTOLIC: 0.92 CM (ref 0.6–1)
ECHO LVOT DIAM: 2.12 CM
ECHO LVOT PEAK GRADIENT: 1.24 MMHG
ECHO LVOT PEAK VELOCITY: 55.75 CM/S
ECHO LVOT SV: 29.2 ML
ECHO LVOT SV: 9.9 ML
ECHO LVOT VTI: 8.28 CM
ECHO RA AREA 4C: 13.04 CM2
ECHO RV INTERNAL DIMENSION: 5.19 CM
EOSINOPHIL # BLD: 0.3 K/UL (ref 0–0.4)
EOSINOPHIL NFR BLD: 2 % (ref 0–5)
ERYTHROCYTE [DISTWIDTH] IN BLOOD BY AUTOMATED COUNT: 11.4 % (ref 11.6–14.5)
EST. AVERAGE GLUCOSE BLD GHB EST-MCNC: 97 MG/DL
ETHANOL SERPL-MCNC: 17 MG/DL (ref 0–3)
GAS FLOW.O2 O2 DELIVERY SYS: ABNORMAL L/MIN
GAS FLOW.O2 O2 DELIVERY SYS: ABNORMAL L/MIN
GAS FLOW.O2 SETTING OXYMISER: 24 BPM
GLOBULIN SER CALC-MCNC: 3.4 G/DL (ref 2–4)
GLUCOSE BLD STRIP.AUTO-MCNC: 93 MG/DL (ref 70–110)
GLUCOSE BLD STRIP.AUTO-MCNC: 97 MG/DL (ref 70–110)
GLUCOSE SERPL-MCNC: 135 MG/DL (ref 74–99)
GLUCOSE UR STRIP.AUTO-MCNC: NEGATIVE MG/DL
HBA1C MFR BLD: 5 % (ref 4.2–5.6)
HCO3 BLD-SCNC: 18.7 MMOL/L (ref 22–26)
HCO3 BLD-SCNC: 25.1 MMOL/L (ref 22–26)
HCO3 BLD-SCNC: 25.6 MMOL/L (ref 22–26)
HCT VFR BLD AUTO: 52.6 % (ref 36–48)
HDSCOM,HDSCOM: ABNORMAL
HGB BLD-MCNC: 16.9 G/DL (ref 13–16)
HGB UR QL STRIP: NEGATIVE
INR PPP: 1.2 (ref 0.8–1.2)
KETONES UR QL STRIP.AUTO: NEGATIVE MG/DL
LACTATE BLD-SCNC: 9.48 MMOL/L (ref 0.4–2)
LACTATE BLD-SCNC: 9.6 MMOL/L (ref 0.4–2)
LACTATE SERPL-SCNC: 1.7 MMOL/L (ref 0.4–2)
LACTATE SERPL-SCNC: 2.1 MMOL/L (ref 0.4–2)
LACTATE SERPL-SCNC: 2.3 MMOL/L (ref 0.4–2)
LEUKOCYTE ESTERASE UR QL STRIP.AUTO: NEGATIVE
LVOT MG: 0.82 MMHG
LYMPHOCYTES # BLD: 2.6 K/UL (ref 0.9–3.6)
LYMPHOCYTES NFR BLD: 19 % (ref 21–52)
MAGNESIUM SERPL-MCNC: 3.1 MG/DL (ref 1.6–2.6)
MCH RBC QN AUTO: 30.1 PG (ref 24–34)
MCHC RBC AUTO-ENTMCNC: 32.1 G/DL (ref 31–37)
MCV RBC AUTO: 93.8 FL (ref 74–97)
METHADONE UR QL: NEGATIVE
MONOCYTES # BLD: 0 K/UL (ref 0.05–1.2)
MONOCYTES NFR BLD: 0 % (ref 3–10)
NEUTS BAND NFR BLD MANUAL: 16 % (ref 0–5)
NEUTS SEG # BLD: 10.6 K/UL (ref 1.8–8)
NEUTS SEG NFR BLD: 63 % (ref 40–73)
NITRITE UR QL STRIP.AUTO: NEGATIVE
O2/TOTAL GAS SETTING VFR VENT: 100 %
OPIATES UR QL: NEGATIVE
P-R INTERVAL, ECG05: 134 MS
PCO2 BLD: 54.2 MMHG (ref 35–45)
PCO2 BLD: 60.1 MMHG (ref 35–45)
PCO2 BLD: 60.6 MMHG (ref 35–45)
PCP UR QL: NEGATIVE
PEEP RESPIRATORY: 10 CMH2O
PH BLD: 7.15 [PH] (ref 7.35–7.45)
PH BLD: 7.23 [PH] (ref 7.35–7.45)
PH BLD: 7.24 [PH] (ref 7.35–7.45)
PH UR STRIP: 5.5 [PH] (ref 5–8)
PLATELET # BLD AUTO: 223 K/UL (ref 135–420)
PMV BLD AUTO: 10.7 FL (ref 9.2–11.8)
PO2 BLD: 142 MMHG (ref 80–100)
PO2 BLD: 31 MMHG (ref 80–100)
PO2 BLD: 63 MMHG (ref 80–100)
POTASSIUM SERPL-SCNC: 4 MMOL/L (ref 3.5–5.5)
PROT SERPL-MCNC: 7.8 G/DL (ref 6.4–8.2)
PROT UR STRIP-MCNC: NEGATIVE MG/DL
PROTHROMBIN TIME: 14.5 SEC (ref 11.5–15.2)
Q-T INTERVAL, ECG07: 318 MS
QRS DURATION, ECG06: 80 MS
QTC CALCULATION (BEZET), ECG08: 467 MS
RBC # BLD AUTO: 5.61 M/UL (ref 4.35–5.65)
RBC MORPH BLD: ABNORMAL
SALICYLATES SERPL-MCNC: 2 MG/DL (ref 2.8–20)
SAO2 % BLD: 42.6 % (ref 92–97)
SAO2 % BLD: 86.7 % (ref 92–97)
SAO2 % BLD: 98.6 % (ref 92–97)
SERVICE CMNT-IMP: ABNORMAL
SODIUM SERPL-SCNC: 139 MMOL/L (ref 136–145)
SOURCE, COVRS: NORMAL
SP GR UR REFRACTOMETRY: 1.01 (ref 1–1.03)
SPECIMEN TYPE: ABNORMAL
TROPONIN I SERPL-MCNC: 0.03 NG/ML (ref 0–0.04)
TROPONIN I SERPL-MCNC: 0.03 NG/ML (ref 0–0.04)
TROPONIN I SERPL-MCNC: 0.05 NG/ML (ref 0–0.04)
UROBILINOGEN UR QL STRIP.AUTO: 0.2 EU/DL (ref 0.2–1)
VENTILATION MODE VENT: ABNORMAL
VENTRICULAR RATE, ECG03: 130 BPM
VT SETTING VENT: 500 ML
WBC # BLD AUTO: 13.5 K/UL (ref 4.6–13.2)

## 2021-07-21 PROCEDURE — 80307 DRUG TEST PRSMV CHEM ANLYZR: CPT

## 2021-07-21 PROCEDURE — 82962 GLUCOSE BLOOD TEST: CPT

## 2021-07-21 PROCEDURE — 74011250637 HC RX REV CODE- 250/637: Performed by: HOSPITALIST

## 2021-07-21 PROCEDURE — 0BH17EZ INSERTION OF ENDOTRACHEAL AIRWAY INTO TRACHEA, VIA NATURAL OR ARTIFICIAL OPENING: ICD-10-PCS | Performed by: EMERGENCY MEDICINE

## 2021-07-21 PROCEDURE — 99285 EMERGENCY DEPT VISIT HI MDM: CPT

## 2021-07-21 PROCEDURE — 71045 X-RAY EXAM CHEST 1 VIEW: CPT

## 2021-07-21 PROCEDURE — 94762 N-INVAS EAR/PLS OXIMTRY CONT: CPT

## 2021-07-21 PROCEDURE — 65610000006 HC RM INTENSIVE CARE

## 2021-07-21 PROCEDURE — 51702 INSERT TEMP BLADDER CATH: CPT

## 2021-07-21 PROCEDURE — 80143 DRUG ASSAY ACETAMINOPHEN: CPT

## 2021-07-21 PROCEDURE — 82803 BLOOD GASES ANY COMBINATION: CPT

## 2021-07-21 PROCEDURE — 82077 ASSAY SPEC XCP UR&BREATH IA: CPT

## 2021-07-21 PROCEDURE — 5A1945Z RESPIRATORY VENTILATION, 24-96 CONSECUTIVE HOURS: ICD-10-PCS | Performed by: EMERGENCY MEDICINE

## 2021-07-21 PROCEDURE — 83605 ASSAY OF LACTIC ACID: CPT

## 2021-07-21 PROCEDURE — 77010033678 HC OXYGEN DAILY

## 2021-07-21 PROCEDURE — 87040 BLOOD CULTURE FOR BACTERIA: CPT

## 2021-07-21 PROCEDURE — 02HV33Z INSERTION OF INFUSION DEVICE INTO SUPERIOR VENA CAVA, PERCUTANEOUS APPROACH: ICD-10-PCS | Performed by: INTERNAL MEDICINE

## 2021-07-21 PROCEDURE — 80179 DRUG ASSAY SALICYLATE: CPT

## 2021-07-21 PROCEDURE — 83735 ASSAY OF MAGNESIUM: CPT

## 2021-07-21 PROCEDURE — 96375 TX/PRO/DX INJ NEW DRUG ADDON: CPT

## 2021-07-21 PROCEDURE — C8929 TTE W OR WO FOL WCON,DOPPLER: HCPCS

## 2021-07-21 PROCEDURE — 85025 COMPLETE CBC W/AUTO DIFF WBC: CPT

## 2021-07-21 PROCEDURE — 87635 SARS-COV-2 COVID-19 AMP PRB: CPT

## 2021-07-21 PROCEDURE — 96365 THER/PROPH/DIAG IV INF INIT: CPT

## 2021-07-21 PROCEDURE — 85610 PROTHROMBIN TIME: CPT

## 2021-07-21 PROCEDURE — 74011250636 HC RX REV CODE- 250/636: Performed by: EMERGENCY MEDICINE

## 2021-07-21 PROCEDURE — 85730 THROMBOPLASTIN TIME PARTIAL: CPT

## 2021-07-21 PROCEDURE — 36600 WITHDRAWAL OF ARTERIAL BLOOD: CPT

## 2021-07-21 PROCEDURE — 74018 RADEX ABDOMEN 1 VIEW: CPT

## 2021-07-21 PROCEDURE — 31500 INSERT EMERGENCY AIRWAY: CPT

## 2021-07-21 PROCEDURE — 74011000258 HC RX REV CODE- 258: Performed by: EMERGENCY MEDICINE

## 2021-07-21 PROCEDURE — C9113 INJ PANTOPRAZOLE SODIUM, VIA: HCPCS | Performed by: INTERNAL MEDICINE

## 2021-07-21 PROCEDURE — 81003 URINALYSIS AUTO W/O SCOPE: CPT

## 2021-07-21 PROCEDURE — 96374 THER/PROPH/DIAG INJ IV PUSH: CPT

## 2021-07-21 PROCEDURE — 93005 ELECTROCARDIOGRAM TRACING: CPT

## 2021-07-21 PROCEDURE — 36415 COLL VENOUS BLD VENIPUNCTURE: CPT

## 2021-07-21 PROCEDURE — 83880 ASSAY OF NATRIURETIC PEPTIDE: CPT

## 2021-07-21 PROCEDURE — 70450 CT HEAD/BRAIN W/O DYE: CPT

## 2021-07-21 PROCEDURE — 74011000250 HC RX REV CODE- 250: Performed by: EMERGENCY MEDICINE

## 2021-07-21 PROCEDURE — 83036 HEMOGLOBIN GLYCOSYLATED A1C: CPT

## 2021-07-21 PROCEDURE — 82553 CREATINE MB FRACTION: CPT

## 2021-07-21 PROCEDURE — 74011000258 HC RX REV CODE- 258: Performed by: INTERNAL MEDICINE

## 2021-07-21 PROCEDURE — 74011250636 HC RX REV CODE- 250/636: Performed by: INTERNAL MEDICINE

## 2021-07-21 PROCEDURE — 94002 VENT MGMT INPAT INIT DAY: CPT

## 2021-07-21 PROCEDURE — 80053 COMPREHEN METABOLIC PANEL: CPT

## 2021-07-21 PROCEDURE — C1751 CATH, INF, PER/CENT/MIDLINE: HCPCS

## 2021-07-21 PROCEDURE — 0202U NFCT DS 22 TRGT SARS-COV-2: CPT

## 2021-07-21 PROCEDURE — 74011250636 HC RX REV CODE- 250/636: Performed by: HOSPITALIST

## 2021-07-21 PROCEDURE — 74011000250 HC RX REV CODE- 250: Performed by: INTERNAL MEDICINE

## 2021-07-21 PROCEDURE — 96376 TX/PRO/DX INJ SAME DRUG ADON: CPT

## 2021-07-21 PROCEDURE — 74011250636 HC RX REV CODE- 250/636

## 2021-07-21 RX ORDER — SODIUM CHLORIDE 0.9 % (FLUSH) 0.9 %
5-40 SYRINGE (ML) INJECTION EVERY 8 HOURS
Status: DISCONTINUED | OUTPATIENT
Start: 2021-07-21 | End: 2021-07-27 | Stop reason: HOSPADM

## 2021-07-21 RX ORDER — FENTANYL CITRATE 50 UG/ML
100 INJECTION, SOLUTION INTRAMUSCULAR; INTRAVENOUS
Status: COMPLETED | OUTPATIENT
Start: 2021-07-21 | End: 2021-07-21

## 2021-07-21 RX ORDER — LORAZEPAM 2 MG/ML
2 INJECTION INTRAMUSCULAR ONCE
Status: COMPLETED | OUTPATIENT
Start: 2021-07-21 | End: 2021-07-21

## 2021-07-21 RX ORDER — NOREPINEPHRINE BIT/0.9 % NACL 8 MG/250ML
.5-16 INFUSION BOTTLE (ML) INTRAVENOUS
Status: DISCONTINUED | OUTPATIENT
Start: 2021-07-21 | End: 2021-07-22

## 2021-07-21 RX ORDER — LORAZEPAM 2 MG/ML
1 INJECTION INTRAMUSCULAR
Status: DISCONTINUED | OUTPATIENT
Start: 2021-07-21 | End: 2021-07-27 | Stop reason: HOSPADM

## 2021-07-21 RX ORDER — ETOMIDATE 2 MG/ML
INJECTION INTRAVENOUS
Status: COMPLETED | OUTPATIENT
Start: 2021-07-21 | End: 2021-07-21

## 2021-07-21 RX ORDER — PROPOFOL 10 MG/ML
0-50 VIAL (ML) INTRAVENOUS
Status: DISCONTINUED | OUTPATIENT
Start: 2021-07-21 | End: 2021-07-22

## 2021-07-21 RX ORDER — LORAZEPAM 2 MG/ML
4 INJECTION INTRAMUSCULAR
Status: DISCONTINUED | OUTPATIENT
Start: 2021-07-21 | End: 2021-07-27 | Stop reason: HOSPADM

## 2021-07-21 RX ORDER — LEVOFLOXACIN 5 MG/ML
750 INJECTION, SOLUTION INTRAVENOUS EVERY 24 HOURS
Status: DISCONTINUED | OUTPATIENT
Start: 2021-07-21 | End: 2021-07-22

## 2021-07-21 RX ORDER — ONDANSETRON 2 MG/ML
4 INJECTION INTRAMUSCULAR; INTRAVENOUS
Status: COMPLETED | OUTPATIENT
Start: 2021-07-21 | End: 2021-07-21

## 2021-07-21 RX ORDER — VECURONIUM BROMIDE FOR INJECTION 1 MG/ML
0.1 INJECTION, POWDER, LYOPHILIZED, FOR SOLUTION INTRAVENOUS
Status: COMPLETED | OUTPATIENT
Start: 2021-07-21 | End: 2021-07-21

## 2021-07-21 RX ORDER — LORAZEPAM 1 MG/1
1 TABLET ORAL
Status: DISCONTINUED | OUTPATIENT
Start: 2021-07-21 | End: 2021-07-27 | Stop reason: HOSPADM

## 2021-07-21 RX ORDER — LORAZEPAM 2 MG/ML
3 INJECTION INTRAMUSCULAR
Status: DISCONTINUED | OUTPATIENT
Start: 2021-07-21 | End: 2021-07-27 | Stop reason: HOSPADM

## 2021-07-21 RX ORDER — MIDAZOLAM IN 0.9 % SOD.CHLORID 1 MG/ML
0-10 PLASTIC BAG, INJECTION (ML) INTRAVENOUS
Status: DISCONTINUED | OUTPATIENT
Start: 2021-07-21 | End: 2021-07-23

## 2021-07-21 RX ORDER — SODIUM CHLORIDE 0.9 % (FLUSH) 0.9 %
5-10 SYRINGE (ML) INJECTION AS NEEDED
Status: DISCONTINUED | OUTPATIENT
Start: 2021-07-21 | End: 2021-07-27 | Stop reason: HOSPADM

## 2021-07-21 RX ORDER — ACETAMINOPHEN 325 MG/1
650 TABLET ORAL
Status: DISCONTINUED | OUTPATIENT
Start: 2021-07-21 | End: 2021-07-27 | Stop reason: HOSPADM

## 2021-07-21 RX ORDER — SODIUM CHLORIDE 0.9 % (FLUSH) 0.9 %
5-40 SYRINGE (ML) INJECTION AS NEEDED
Status: DISCONTINUED | OUTPATIENT
Start: 2021-07-21 | End: 2021-07-27 | Stop reason: HOSPADM

## 2021-07-21 RX ORDER — PROPOFOL 10 MG/ML
INJECTION, EMULSION INTRAVENOUS
Status: COMPLETED
Start: 2021-07-21 | End: 2021-07-21

## 2021-07-21 RX ORDER — VANCOMYCIN HYDROCHLORIDE
1250
Status: DISCONTINUED | OUTPATIENT
Start: 2021-07-22 | End: 2021-07-22

## 2021-07-21 RX ORDER — POLYETHYLENE GLYCOL 3350 17 G/17G
17 POWDER, FOR SOLUTION ORAL DAILY PRN
Status: DISCONTINUED | OUTPATIENT
Start: 2021-07-21 | End: 2021-07-27 | Stop reason: HOSPADM

## 2021-07-21 RX ORDER — HEPARIN SODIUM 5000 [USP'U]/ML
5000 INJECTION, SOLUTION INTRAVENOUS; SUBCUTANEOUS EVERY 8 HOURS
Status: DISCONTINUED | OUTPATIENT
Start: 2021-07-21 | End: 2021-07-22

## 2021-07-21 RX ORDER — INSULIN LISPRO 100 [IU]/ML
INJECTION, SOLUTION INTRAVENOUS; SUBCUTANEOUS EVERY 6 HOURS
Status: DISCONTINUED | OUTPATIENT
Start: 2021-07-21 | End: 2021-07-25

## 2021-07-21 RX ORDER — CLINDAMYCIN PHOSPHATE 600 MG/50ML
600 INJECTION, SOLUTION INTRAVENOUS EVERY 8 HOURS
Status: DISCONTINUED | OUTPATIENT
Start: 2021-07-21 | End: 2021-07-21

## 2021-07-21 RX ORDER — LORAZEPAM 2 MG/ML
2 INJECTION INTRAMUSCULAR
Status: ACTIVE | OUTPATIENT
Start: 2021-07-21 | End: 2021-07-21

## 2021-07-21 RX ORDER — LORAZEPAM 2 MG/ML
2 INJECTION INTRAMUSCULAR
Status: DISCONTINUED | OUTPATIENT
Start: 2021-07-21 | End: 2021-07-27 | Stop reason: HOSPADM

## 2021-07-21 RX ORDER — MIDAZOLAM HYDROCHLORIDE 1 MG/ML
INJECTION, SOLUTION INTRAMUSCULAR; INTRAVENOUS
Status: COMPLETED
Start: 2021-07-21 | End: 2021-07-21

## 2021-07-21 RX ORDER — CHLORHEXIDINE GLUCONATE 1.2 MG/ML
10 RINSE ORAL EVERY 12 HOURS
Status: DISCONTINUED | OUTPATIENT
Start: 2021-07-21 | End: 2021-07-23

## 2021-07-21 RX ORDER — PROPOFOL 10 MG/ML
100 INJECTION, EMULSION INTRAVENOUS
Status: COMPLETED | OUTPATIENT
Start: 2021-07-21 | End: 2021-07-21

## 2021-07-21 RX ORDER — ONDANSETRON 2 MG/ML
4 INJECTION INTRAMUSCULAR; INTRAVENOUS
Status: DISCONTINUED | OUTPATIENT
Start: 2021-07-21 | End: 2021-07-27 | Stop reason: HOSPADM

## 2021-07-21 RX ORDER — MIDAZOLAM HYDROCHLORIDE 1 MG/ML
2 INJECTION, SOLUTION INTRAMUSCULAR; INTRAVENOUS
Status: COMPLETED | OUTPATIENT
Start: 2021-07-21 | End: 2021-07-21

## 2021-07-21 RX ORDER — LEVOFLOXACIN 5 MG/ML
750 INJECTION, SOLUTION INTRAVENOUS EVERY 24 HOURS
Status: DISCONTINUED | OUTPATIENT
Start: 2021-07-21 | End: 2021-07-21

## 2021-07-21 RX ORDER — SODIUM CHLORIDE 9 MG/ML
75 INJECTION, SOLUTION INTRAVENOUS CONTINUOUS
Status: DISCONTINUED | OUTPATIENT
Start: 2021-07-21 | End: 2021-07-26

## 2021-07-21 RX ORDER — DEXTROSE 50 % IN WATER (D50W) INTRAVENOUS SYRINGE
25-50 AS NEEDED
Status: DISCONTINUED | OUTPATIENT
Start: 2021-07-21 | End: 2021-07-27 | Stop reason: HOSPADM

## 2021-07-21 RX ORDER — ONDANSETRON 4 MG/1
4 TABLET, ORALLY DISINTEGRATING ORAL
Status: DISCONTINUED | OUTPATIENT
Start: 2021-07-21 | End: 2021-07-27 | Stop reason: HOSPADM

## 2021-07-21 RX ORDER — PROPOFOL 10 MG/ML
50 INJECTION, EMULSION INTRAVENOUS
Status: COMPLETED | OUTPATIENT
Start: 2021-07-21 | End: 2021-07-21

## 2021-07-21 RX ORDER — LORAZEPAM 1 MG/1
2 TABLET ORAL
Status: DISCONTINUED | OUTPATIENT
Start: 2021-07-21 | End: 2021-07-27 | Stop reason: HOSPADM

## 2021-07-21 RX ORDER — FENTANYL CITRATE 50 UG/ML
INJECTION, SOLUTION INTRAMUSCULAR; INTRAVENOUS
Status: COMPLETED
Start: 2021-07-21 | End: 2021-07-21

## 2021-07-21 RX ORDER — SUCCINYLCHOLINE CHLORIDE 20 MG/ML
INJECTION INTRAMUSCULAR; INTRAVENOUS
Status: COMPLETED | OUTPATIENT
Start: 2021-07-21 | End: 2021-07-21

## 2021-07-21 RX ORDER — MAGNESIUM SULFATE 100 %
4 CRYSTALS MISCELLANEOUS AS NEEDED
Status: DISCONTINUED | OUTPATIENT
Start: 2021-07-21 | End: 2021-07-27 | Stop reason: HOSPADM

## 2021-07-21 RX ORDER — LORAZEPAM 2 MG/ML
INJECTION INTRAMUSCULAR
Status: DISPENSED
Start: 2021-07-21 | End: 2021-07-21

## 2021-07-21 RX ORDER — SODIUM CHLORIDE 0.9 % (FLUSH) 0.9 %
5-40 SYRINGE (ML) INJECTION EVERY 8 HOURS
Status: DISCONTINUED | OUTPATIENT
Start: 2021-07-21 | End: 2021-07-22

## 2021-07-21 RX ORDER — FUROSEMIDE 10 MG/ML
40 INJECTION INTRAMUSCULAR; INTRAVENOUS
Status: ACTIVE | OUTPATIENT
Start: 2021-07-21 | End: 2021-07-21

## 2021-07-21 RX ORDER — ACETAMINOPHEN 650 MG/1
650 SUPPOSITORY RECTAL
Status: DISCONTINUED | OUTPATIENT
Start: 2021-07-21 | End: 2021-07-27 | Stop reason: HOSPADM

## 2021-07-21 RX ORDER — FENTANYL CITRATE 50 UG/ML
100 INJECTION, SOLUTION INTRAMUSCULAR; INTRAVENOUS
Status: DISCONTINUED | OUTPATIENT
Start: 2021-07-21 | End: 2021-07-23

## 2021-07-21 RX ADMIN — HEPARIN SODIUM 5000 UNITS: 5000 INJECTION INTRAVENOUS; SUBCUTANEOUS at 10:36

## 2021-07-21 RX ADMIN — MIDAZOLAM 2 MG/HR: 5 INJECTION INTRAMUSCULAR; INTRAVENOUS at 12:50

## 2021-07-21 RX ADMIN — Medication 10 ML: at 09:54

## 2021-07-21 RX ADMIN — CISATRACURIUM BESYLATE 3 MCG/KG/MIN: 2 INJECTION, SOLUTION INTRAVENOUS at 09:13

## 2021-07-21 RX ADMIN — PROPOFOL 100 MG: 10 INJECTION, EMULSION INTRAVENOUS at 07:43

## 2021-07-21 RX ADMIN — 0.12% CHLORHEXIDINE GLUCONATE 10 ML: 1.2 RINSE ORAL at 10:35

## 2021-07-21 RX ADMIN — LEVOFLOXACIN 750 MG: 5 INJECTION, SOLUTION INTRAVENOUS at 13:09

## 2021-07-21 RX ADMIN — PROPOFOL 40 MCG/KG/MIN: 10 INJECTION, EMULSION INTRAVENOUS at 09:45

## 2021-07-21 RX ADMIN — PROPOFOL 50 MG: 10 INJECTION, EMULSION INTRAVENOUS at 07:27

## 2021-07-21 RX ADMIN — SUCCINYLCHOLINE CHLORIDE 100 MG: 20 INJECTION, SOLUTION INTRAMUSCULAR; INTRAVENOUS at 07:10

## 2021-07-21 RX ADMIN — VECURONIUM BROMIDE 8.16 MG: 1 INJECTION, POWDER, LYOPHILIZED, FOR SOLUTION INTRAVENOUS at 07:40

## 2021-07-21 RX ADMIN — PROPOFOL 25 MCG/KG/MIN: 10 INJECTION, EMULSION INTRAVENOUS at 07:21

## 2021-07-21 RX ADMIN — LORAZEPAM 2 MG: 2 INJECTION INTRAMUSCULAR; INTRAVENOUS at 07:38

## 2021-07-21 RX ADMIN — Medication 10 ML: at 14:00

## 2021-07-21 RX ADMIN — FENTANYL CITRATE 100 MCG: 50 INJECTION, SOLUTION INTRAMUSCULAR; INTRAVENOUS at 07:24

## 2021-07-21 RX ADMIN — MIDAZOLAM HYDROCHLORIDE 2 MG: 1 INJECTION, SOLUTION INTRAMUSCULAR; INTRAVENOUS at 07:24

## 2021-07-21 RX ADMIN — FENTANYL CITRATE 100 MCG: 50 INJECTION, SOLUTION INTRAMUSCULAR; INTRAVENOUS at 07:29

## 2021-07-21 RX ADMIN — Medication 4 MCG/MIN: at 12:46

## 2021-07-21 RX ADMIN — ETOMIDATE 30 MG: 2 INJECTION INTRAVENOUS at 07:10

## 2021-07-21 RX ADMIN — 0.12% CHLORHEXIDINE GLUCONATE 10 ML: 1.2 RINSE ORAL at 20:56

## 2021-07-21 RX ADMIN — PIPERACILLIN AND TAZOBACTAM 4.5 G: 4; .5 INJECTION, POWDER, LYOPHILIZED, FOR SOLUTION INTRAVENOUS; PARENTERAL at 18:44

## 2021-07-21 RX ADMIN — PIPERACILLIN AND TAZOBACTAM 4.5 G: 4; .5 INJECTION, POWDER, LYOPHILIZED, FOR SOLUTION INTRAVENOUS; PARENTERAL at 10:35

## 2021-07-21 RX ADMIN — ACETAMINOPHEN 650 MG: 325 TABLET ORAL at 22:03

## 2021-07-21 RX ADMIN — CLINDAMYCIN PHOSPHATE 600 MG: 600 INJECTION, SOLUTION INTRAVENOUS at 07:31

## 2021-07-21 RX ADMIN — HEPARIN SODIUM 5000 UNITS: 5000 INJECTION INTRAVENOUS; SUBCUTANEOUS at 18:45

## 2021-07-21 RX ADMIN — SODIUM CHLORIDE 75 ML/HR: 900 INJECTION, SOLUTION INTRAVENOUS at 09:45

## 2021-07-21 RX ADMIN — ONDANSETRON 4 MG: 2 INJECTION INTRAMUSCULAR; INTRAVENOUS at 07:05

## 2021-07-21 RX ADMIN — PERFLUTREN 1 ML: 6.52 INJECTION, SUSPENSION INTRAVENOUS at 10:25

## 2021-07-21 RX ADMIN — FENTANYL CITRATE 50 MCG/HR: 0.05 INJECTION, SOLUTION INTRAMUSCULAR; INTRAVENOUS at 08:00

## 2021-07-21 RX ADMIN — CISATRACURIUM BESYLATE 4 MCG/KG/MIN: 2 INJECTION, SOLUTION INTRAVENOUS at 17:55

## 2021-07-21 RX ADMIN — Medication 16 MCG/MIN: at 20:40

## 2021-07-21 RX ADMIN — PROPOFOL 50 MG: 10 INJECTION, EMULSION INTRAVENOUS at 07:25

## 2021-07-21 RX ADMIN — Medication 10 ML: at 18:45

## 2021-07-21 RX ADMIN — SODIUM CHLORIDE 40 MG: 9 INJECTION, SOLUTION INTRAMUSCULAR; INTRAVENOUS; SUBCUTANEOUS at 10:35

## 2021-07-21 RX ADMIN — VANCOMYCIN HYDROCHLORIDE 1000 MG: 1 INJECTION, POWDER, LYOPHILIZED, FOR SOLUTION INTRAVENOUS at 07:58

## 2021-07-21 NOTE — Clinical Note
TRANSFER - IN REPORT:     Verbal report received from: rn.     Report consisted of patient's Situation, Background, Assessment and   Recommendations(SBAR). Opportunity for questions and clarification was provided. Assessment completed upon patient's arrival to unit and care assumed. Patient transported with a Registered Nurse and [address] [place] [address] / [facility] dondeEstaâ„¢.

## 2021-07-21 NOTE — PROGRESS NOTES
Physician Progress Note      PATIENT:               Luz Marina Golden  CSN #:                  [id number]  :                       1985  ADMIT DATE:       2021 7:00 AM  [address] [place] DATE:  RESPONDING  PROVIDER #:        Milton HARRISON MD        QUERY TEXT:    Type of Encephalopathy: Please provide further specificity, if known. Clinical indicators include: alcohol abuse, hepatitis, encephalopathy, sepsis, liver function, alcohol use  Options provided:  -- Anoxic/hypoxic encephalopathy  -- Metabolic encephalopathy  -- Toxic encephalopathy  -- Hepatic encephalopathy  -- Hypertensive encephalopathy  -- Other - I will add my own diagnosis  -- Disagree - Not applicable / Not valid  -- Disagree - Clinically Unable to determine / Unknown        PROVIDER RESPONSE TEXT:    The patient has metabolic encephalopathy.       Electronically signed by:  Milton HARRISON MD 2021 7:42 PM

## 2021-07-21 NOTE — PROGRESS NOTES
conducted an initial consultation for Jenna Damon, who is a 30 y.o.,male. Patient is intubated and just getting settled in to the room. Patient's mother called in. She would be his legal next of kin. I added her info to the demographics page. Mom is in [place] at the moment. Mother - Reta Boy - [phone]  Sister - [address] Francis  - [place] Gloria Adler.   Board Certified   [phone] - Office

## 2021-07-21 NOTE — PROCEDURES
[facility]  Central Line Procedure Note with US guidance    Indication:   Acute respiratory failure with hypoxemia  Cardiogenic shock    Risks, benefits, alternatives explained and consent obtained from patient father and mother. Patient positioned in supine. Full sterile barrier precautions used. Sterility Protocol followed. (cap, mask sterile gown, sterile gloves, large sterile sheet, hand hygiene, 2% chlorhexidine for cutaneous antisepsis, sterile probe cover). Local anesthetic with Lidocaine 1% - about 5 cc. Groin site used since patient critically ill with high PEEP on ventilator support, and avoid risk of pneumothorax. Using ultrasound guidance, 7 Fr triple lumen cath placed and secured at 16 cm. RIGHT femoral vein cannulated x 1 attempt(s) utilizing the Seldinger technique. Position of wire confirmed in vein using US before dilating. Wire was removed. No immediate complications. Good nonpulsatile venous blood return, carefully flushed. Catheter secured & Biopatch applied. Sterile Tegaderm placed. Photo-printer not working.     Okay to use line    Cherie Pittman MD

## 2021-07-21 NOTE — PROGRESS NOTES
[facility] Zosyn (Piperacillin/Tazobactam) Extended Infusion    [employer], a 30 y.o. yo male, has been converted to an extended infusion of Zosyn while in the intensive care unit. A loading dose of  4.5 gm will be given over 30 minutes depending on indication. Extended infusions will begin 4 hours after the initial dose if CrCl  >/= 20 ml/min   Extended infusions will run over 4 hours (240 minutes).     Recent Labs     07/21/21 0704   CREA 1.80*     Ht Readings from Last 1 Encounters:   07/21/21 172.7 cm (68\")     Wt Readings from Last 1 Encounters:   07/21/21 81.6 kg (180 lb)       CrCl : Serum creatinine: 1.8 mg/dL (H) 07/21/21 0704  Estimated creatinine clearance: 55.9 mL/min (A)    Renal adjustment of extended infusion of Zosyn  4.5 gm every 8 hours for CrCl >/= 20 ml/min

## 2021-07-21 NOTE — PROGRESS NOTES
Pulmonary/critical care    Patient sister Rosalie Galarza called the ICU; discussed about patient condition and management. It was sister who called EMS this morning. Patient has prior history of overdoses, most recent 3 weeks ago on 4 July. Patient has history of polysubstance abuse. He has history of hepatitis C infection in the past.  He has history of seizures in the past during overdose several years ago and was critically ill. Updated patient condition-critically ill, unresponsive, on ventilator support, severely hypoxemic; reviewed differential diagnosis of acute lung injury, cocaine abuse etc.; reviewed risks of cocaine induced pulmonary and brain damage; reviewed acute renal failure. Reviewed risks of encephalopathy due to prolonged hypoxemia at home; risks of cocaine induced cardiomyopathy; risks of dialysis needs, risks of prolonged ventilator dependence etc.  Questions answered to satisfaction. 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 reviewed. Prognosis is guarded.       Dickie Crigler, MD

## 2021-07-21 NOTE — H&P
History & Physical    Patient: Wilbert Ariza MRN: [id number]  CSN: [id number]    YOB: 1985  Age: 30 y.o. Sex: male      DOA: 7/21/2021  Primary Care Provider:  Francisco Abbott MD      Assessment/Plan     Hospital Problems  Date Reviewed: 6/15/2013        Codes Class Noted POA    * (Principal) ARDS (adult respiratory distress syndrome) ([facility] ICD-10-CM: J80  ICD-9-CM: 518.52  7/21/2021 Unknown        Aspiration pneumonia ([facility] ICD-10-CM: J69.0  ICD-9-CM: 507.0  7/21/2021 Unknown        Alcohol abuse ICD-10-CM: F10.10  ICD-9-CM: 305.00  7/21/2021 Unknown        Hepatitis-C ICD-10-CM: B19.20  ICD-9-CM: 070.70  7/21/2021 Unknown        Acute respiratory failure with hypoxia and hypercapnia (HCC) ICD-10-CM: J96.01, J96.02  ICD-9-CM: 518.81  3/13/2016 Unknown        EZEQUIEL (acute kidney injury) ([facility] ICD-10-CM: N17.9  ICD-9-CM: 584.9  3/10/2016 Yes        Encephalopathy ICD-10-CM: G93.40  ICD-9-CM: 348.30  3/10/2016 Yes        Polysubstance abuse ([facility] ICD-10-CM: F19.10  ICD-9-CM: 305.90  6/15/2013 Unknown        Drug overdose, multiple drugs ICD-10-CM: T50.911A  ICD-9-CM: 977.9, E980.5  6/15/2013 Yes            Wilbert Ariza is a 30 y.o. male with history of for polysubstance abuse was found unresponsiveness per sister. He was intubated in ER due to severe hypoxia. Admit to icu     CRITICAL CARE PLAN    Resp -   Acute respiratory failure with hypoxia   Continue vent management per intensivist   see vent orders, VAP bundle. HOB>30 degrees. Bronchodilators. Follow sputum cx. Daily CXR. Check covid 19     ARDS and aspiration pneumonia  Continue IV antibiotics and supportive treatment  Sepsis protocol was started in ER    ID - Follow up blood and urine cx. ANTIBIOTICS . Continue vancomycin , Zosyn and Levaquin    CVS -   Tachycardia: Cardiac enzymes x1 -, will have echo  Cardiac monitor, so far BP remained good    Heme/onc -   Leukocytosis : follow H&H, plts. INR.     Renal - EZEQUIEL : Creatinine 1.8  IV hydration, trend BUN, Cr, follow I/O, narvaez in place. Check and replace Mg, K, phos. ICU electrolyte replacement protocol      Endocrine -  Follow FSG  Sliding scale for glucose control    Neuro/ Pain/ Sedation - Fentanyl, versed, propofol and nimbex     Unresponsiveness encephalopathy  CT head no acute process     GI - NPO for now. protonix   HCV: So far liver function good, INR was good    Psych : polysubstance abuse, and alcohol abuse   uds positive for of amphentermine, cocaine  [place] protocol  Prophylaxis - DVT: heparin, GI: protonix    Prognosis is guarded  Please note that this dictation was completed with [employer]ounder, the computer voice recognition software. Quite often unanticipated grammatical, syntax, homophones, and other interpretive errors are inadvertently transcribed by the computer software. Please disregard these errors. Please excuse any errors that have escaped final proofreading    Estimate  length of stay : tbd     70 minutes of critical care time spent in the direct evaluation and treatment of this high risk patient. The reason for providing this level of medical care for this critically ill patient was due a critical illness that impaired one or more vital organ systems such that there was a high probability of imminent or life threatening deterioration in the patients condition. This care involved high complexity decision making to assess, manipulate, and support vital system functions, to treat this degreee vital organ system failure and to prevent further life threatening deterioration of the patients condition. CC: Unresponsiveness       HPI:     Maria Esther Gonsalez is a 30 y.o. male with history of for polysubstance abuse was sent to ER due to unresponsiveness. He was found unresponsive per sister on pouch. EMS was called, he was found hypoxia, total 6 mg Narcan was administrated. He became a little bit respond.   He was found hypoxia per EMS, nonrebreathing mask was put on. He become agitated in ER, high flow oxygen was put, but still hypoxia. He was intubated in ER with  nimbex administrated. Chest x-ray indicated bilateral infiltration. troponin x1 within normal limit. Creatinine 1.8. White count 13.5. CT head pending. Sepsis protocol was  started in ER, lactic acid was 9. Broad coverage IV antibiotics was administrated. Intensivist was consulted. Visit Vitals  /74   Pulse (!) 120   Temp 97.5 °F (36.4 °C)   Resp 24   Ht 5' 8\" (1.727 m)   Wt 81.6 kg (180 lb)   SpO2 91%   BMI 27.37 kg/m²    O2 Flow Rate (L/min): 15 l/min O2 Device: Ventilator      Past Medical History:   Diagnosis Date    Drug abuse, IV (HCC)     Heroin abuse ([facility]     Psychiatric diagnosis     ADHD    Psychiatric diagnosis     drug abuse.  Psychiatric disorder     anxiety     Past surgical history  Unknown    Family history:  Unknown    Social History     Socioeconomic History    Marital status: SINGLE     Spouse name: Not on file    Number of children: Not on file    Years of education: Not on file    Highest education level: Not on file   Tobacco Use    Smoking status: Current Every Day Smoker     Packs/day: 1.00    Smokeless tobacco: Never Used   Substance and Sexual Activity    Alcohol use: Yes     Comment: daily    Drug use: Yes     Types: Heroin, Prescription, Cocaine   Social History Narrative    ** Merged History Encounter **          Social Determinants of Health     Financial Resource Strain:     Difficulty of Paying Living Expenses:    Food Insecurity:     Worried About Running Out of Food in the Last Year:     [address] in the Last Year:    Transportation Needs:     Lack of Transportation (Medical):      Lack of Transportation (Non-Medical):    Physical Activity:     Days of Exercise per Week:     Minutes of Exercise per Session:    Stress:     Feeling of Stress :    Social Connections:     Frequency of Communication with Friends and Family:     Frequency of Social Gatherings with Friends and Family:     Attends [employer] Services:     Active Member of Clubs or Organizations:     Attends Club or Organization Meetings:     Marital Status:        Prior to Admission medications    Medication Sig Start Date End Date Taking? Authorizing Provider   amphetamine-dextroamphetamine XR (Adderall XR) 20 mg XR capsule Take 20 mg by mouth three (3) times daily. Other, MD Celia       No Known Allergies    Review of Systems  Unable to obtain due to intubation      Physical Exam:     Physical Exam:  Visit Vitals  /74   Pulse (!) 120   Temp 97.5 °F (36.4 °C)   Resp 24   Ht 5' 8\" (1.727 m)   Wt 81.6 kg (180 lb)   SpO2 91%   BMI 27.37 kg/m²    O2 Flow Rate (L/min): 15 l/min O2 Device: Ventilator    Temp (24hrs), Av.5 °F (36.4 °C), Min:97.5 °F (36.4 °C), Max:97.5 °F (36.4 °C)    No intake/output data recorded. No intake/output data recorded. General:   On vent   Head:  Normocephalic, without obvious abnormality, atraumatic. Eyes:  Conjunctivae/corneas clear, sclera anicteric, PERRL, EOMs intact. Nose: Nares normal. No drainage or sinus tenderness. Throat: Lips, mucosa, and tongue normal.  ET tube noted   Neck: Supple, symmetrical, trachea midline, no adenopathy. Lungs:   Coarse bilaterally        Heart:   Tachycardia,  S1, S2 normal, no murmur, click, rub or gallop. Abdomen: Soft, non-tender. Bowel sounds normal. No masses,  No organomegaly. Extremities: Extremities normal, atraumatic, no cyanosis or edema. Pulses: 2+ and symmetric all extremities. Skin: Skin color-pink, texture, turgor normal. No rashes or lesions.  Capillary refill normal    Neurologic:  On sedation , unresponsiveness       Labs Reviewed:    BMP:   Lab Results   Component Value Date/Time     2021 07:04 AM    K 4.0 2021 07:04 AM    CL 99 (L) 2021 07:04 AM    CO2 24 2021 07:04 AM    AGAP 16 2021 07:04 AM  (H) 07/21/2021 07:04 AM    BUN 20 (H) 07/21/2021 07:04 AM    CREA 1.80 (H) 07/21/2021 07:04 AM    GFRAA 53 (L) 07/21/2021 07:04 AM    GFRNA 43 (L) 07/21/2021 07:04 AM     CMP:   Lab Results   Component Value Date/Time     07/21/2021 07:04 AM    K 4.0 07/21/2021 07:04 AM    CL 99 (L) 07/21/2021 07:04 AM    CO2 24 07/21/2021 07:04 AM    AGAP 16 07/21/2021 07:04 AM     (H) 07/21/2021 07:04 AM    BUN 20 (H) 07/21/2021 07:04 AM    CREA 1.80 (H) 07/21/2021 07:04 AM    GFRAA 53 (L) 07/21/2021 07:04 AM    GFRNA 43 (L) 07/21/2021 07:04 AM    CA 9.4 07/21/2021 07:04 AM    MG 3.1 (H) 07/21/2021 07:04 AM    ALB 4.4 07/21/2021 07:04 AM    TP 7.8 07/21/2021 07:04 AM    GLOB 3.4 07/21/2021 07:04 AM    AGRAT 1.3 07/21/2021 07:04 AM     (H) 07/21/2021 07:04 AM     CBC:   Lab Results   Component Value Date/Time    WBC 13.5 (H) 07/21/2021 07:04 AM    HGB 16.9 (H) 07/21/2021 07:04 AM    HCT 52.6 (H) 07/21/2021 07:04 AM     07/21/2021 07:04 AM     All Cardiac Markers in the last 24 hours:   Lab Results   Component Value Date/Time     (H) 07/21/2021 07:04 AM    CKMB 2.4 07/21/2021 07:04 AM    CKND1 0.6 07/21/2021 07:04 AM    TROIQ 0.03 07/21/2021 07:04 AM     Recent Glucose Results:   Lab Results   Component Value Date/Time     (H) 07/21/2021 07:04 AM     ABG:   Lab Results   Component Value Date/Time    PHI 7.24 (LL) 07/21/2021 09:32 AM    PCO2I 60.6 (H) 07/21/2021 09:32 AM    PO2I 63 (L) 07/21/2021 09:32 AM    HCO3I 25.6 07/21/2021 09:32 AM    FIO2I 100 07/21/2021 09:32 AM     COAGS:   Lab Results   Component Value Date/Time    APTT 29.6 07/21/2021 07:04 AM    PTP 14.5 07/21/2021 07:04 AM    INR 1.2 07/21/2021 07:04 AM     Liver Panel:   Lab Results   Component Value Date/Time    ALB 4.4 07/21/2021 07:04 AM    TP 7.8 07/21/2021 07:04 AM    GLOB 3.4 07/21/2021 07:04 AM    AGRAT 1.3 07/21/2021 07:04 AM     (H) 07/21/2021 07:04 AM     07/21/2021 07:04 AM     Pancreatic Markers: No results found for: AMYLPOCT, AML, LIPPOCT, LPSE    CT ABD PELV W CONT    Result Date: 6/25/2021  EXAM: CT of the abdomen and pelvis INDICATION: Pain. COMPARISON: None. TECHNIQUE: Axial CT imaging of the abdomen and pelvis was performed with intravenous contrast. Multiplanar reformats were generated. One or more dose reduction techniques were used on this CT: automated exposure control, adjustment of the mAs and/or kVp according to patient size, and iterative reconstruction techniques. The specific techniques used on this CT exam have been documented in the patient's electronic medical record. Digital Imaging and Communications in Medicine (DICOM) format image data are available to nonaffiliated external healthcare facilities or entities on a secure, media free, reciprocally searchable basis with patient authorization for at least a 12-month period after this study. _______________ FINDINGS: LOWER CHEST: Unremarkable. LIVER, BILIARY: Liver is normal. No biliary dilation. Gallbladder is unremarkable. PANCREAS: Normal. SPLEEN: Normal. ADRENALS: Normal. KIDNEYS/URETERS/BLADDER: No calcification, hydronephrosis, or soft tissue attenuation renal mass. PELVIC ORGANS: Unremarkable. VASCULATURE: Unremarkable LYMPH NODES: No enlarged lymph nodes. GASTROINTESTINAL TRACT: No bowel dilation or wall thickening. Normal appendix. BONES: No acute or aggressive osseous abnormalities identified. OTHER: None. _______________     No acute intra-abdominal abnormality.      Procedures/imaging: see electronic medical records for all procedures/Xrays and details which were not copied into this note but were reviewed prior to creation of Renate Marquez MD, Internal Medicine     CC: Usama Montiel MD

## 2021-07-21 NOTE — CONSULTS
[facility] Consult Note      Patient: Brayan Merlos MRN: [id number]  SSN: [id number]    YOB: 1985  Age: 30 y.o. Sex: male          Date of Consultation: 7/21/2021  Referring Physician: Dr. Tien Todd  Reason for Consultation: Cardiomyopathy, EF 15%    HPI:  I was asked by Mireya Power to see this patient for cardiomyopathy EF 15%. Michael Avina is a 78-year-old gentleman who was admitted in the hospital with unresponsiveness/hypoxic respiratory failure intubated and with sepsis. Underwent echocardiogram that have shown EF of 34%. I have called his mother according to the patient's mother there is no previous history of heart disease. He is chronic alcohol abuser, heroin use, cocaine and polysubstance abuse. Patient is requiring vasopressor support with IV Levophed. Other significant history is significant for anxiety and hep C. Past Medical History:   Diagnosis Date    Drug abuse, IV ([address]     Heroin abuse ([facility]     Psychiatric diagnosis     ADHD    Psychiatric diagnosis     drug abuse.  Psychiatric disorder     anxiety     No past surgical history on file.   Current Facility-Administered Medications   Medication Dose Route Frequency    sodium chloride (NS) flush 5-10 mL  5-10 mL IntraVENous PRN    propofol (DIPRIVAN) 10 mg/mL infusion  0-50 mcg/kg/min IntraVENous TITRATE    furosemide (LASIX) injection 40 mg  40 mg IntraVENous NOW    LORazepam (ATIVAN) injection 2 mg  2 mg IntraVENous NOW    fentaNYL (PF) 900 mcg/30 ml infusion soln  0-200 mcg/hr IntraVENous TITRATE    cisatracurium (NIMBEX) 100 mg in 0.9% sodium chloride 100 mL infusion  0-10 mcg/kg/min IntraVENous TITRATE    chlorhexidine (PERIDEX) 0.12 % mouthwash 10 mL  10 mL Oral Q12H    pantoprazole (PROTONIX) 40 mg in 0.9% sodium chloride 10 mL injection  40 mg IntraVENous Q24H    ELECTROLYTE REPLACEMENT PROTOCOL - Potassium Renal Dosing  1 Each Other PRN    ELECTROLYTE REPLACEMENT PROTOCOL - Magnesium 1 Each Other PRN    ELECTROLYTE REPLACEMENT PROTOCOL  - Phosphorus Renal Dosing  1 Each Other PRN    ELECTROLYTE REPLACEMENT PROTOCOL - Calcium   1 Each Other PRN    0.9% sodium chloride infusion  75 mL/hr IntraVENous CONTINUOUS    insulin lispro (HUMALOG) injection   SubCUTAneous Q6H    glucose chewable tablet 16 g  4 Tablet Oral PRN    glucagon (GLUCAGEN) injection 1 mg  1 mg IntraMUSCular PRN    dextrose (D50W) injection syrg 12.5-25 g  25-50 mL IntraVENous PRN    piperacillin-tazobactam (ZOSYN) 4.5 g in 0.9% sodium chloride (MBP/ADV) 100 mL MBP  4.5 g IntraVENous Q8H    fentaNYL citrate (PF) injection 100 mcg  100 mcg IntraVENous Q4H PRN    LORazepam (ATIVAN) injection 4 mg  4 mg IntraVENous Q4H PRN    midazolam in normal saline (VERSED) 1 mg/mL infusion  0-10 mg/hr IntraVENous TITRATE    heparin (porcine) injection 5,000 Units  5,000 Units SubCUTAneous Q8H    sodium chloride (NS) flush 5-40 mL  5-40 mL IntraVENous Q8H    sodium chloride (NS) flush 5-40 mL  5-40 mL IntraVENous PRN    acetaminophen (TYLENOL) tablet 650 mg  650 mg Oral Q6H PRN    Or    acetaminophen (TYLENOL) suppository 650 mg  650 mg Rectal Q6H PRN    polyethylene glycol (MIRALAX) packet 17 g  17 g Oral DAILY PRN    ondansetron (ZOFRAN ODT) tablet 4 mg  4 mg Oral Q8H PRN    Or    ondansetron (ZOFRAN) injection 4 mg  4 mg IntraVENous Q6H PRN    levoFLOXacin (LEVAQUIN) 750 mg in D5W IVPB  750 mg IntraVENous Q24H    sodium chloride (NS) flush 5-40 mL  5-40 mL IntraVENous Q8H    sodium chloride (NS) flush 5-40 mL  5-40 mL IntraVENous PRN    LORazepam (ATIVAN) tablet 1 mg  1 mg Oral Q1H PRN    Or    LORazepam (ATIVAN) injection 1 mg  1 mg IntraVENous Q1H PRN    LORazepam (ATIVAN) tablet 2 mg  2 mg Oral Q1H PRN    Or    LORazepam (ATIVAN) injection 2 mg  2 mg IntraVENous Q1H PRN    LORazepam (ATIVAN) injection 3 mg  3 mg IntraVENous Q15MIN PRN    [START ON 7/22/2021] vancomycin (VANCOCIN) 1250 mg in  ml infusion 1,250 mg IntraVENous Q18H    Vancomycin Pharmacy to Dose  1 Each Other Rx Dosing/Monitoring    NOREPINephrine (LEVOPHED) 8 mg in 0.9% NS 250ml infusion  0.5-16 mcg/min IntraVENous TITRATE    PHENYLephrine (MARYANN-SYNEPHRINE) 30 mg in 0.9% sodium chloride 250 mL infusion   mcg/min IntraVENous TITRATE       Allergies and Intolerances:   No Known Allergies    Family History:   History reviewed. No pertinent family history. Social History:   He  reports that he has been smoking. He has been smoking about 1.00 pack per day. He has never used smokeless tobacco.  He  reports current alcohol use. Review of Systems:      Limited due to intubation      Physical:   Patient Vitals for the past 6 hrs:   Temp Pulse Resp BP SpO2   07/21/21 1507 -- (!) 123 24 -- 94 %   07/21/21 1400 99 °F (37.2 °C) (!) 121 24 (!) 83/57 96 %   07/21/21 1359 -- (!) 121 24 (!) 78/61 96 %   07/21/21 1345 -- (!) 117 24 (!) 87/31 96 %   07/21/21 1203 -- (!) 108 24 -- 96 %   07/21/21 1145 -- (!) 115 24 (!) 67/54 94 %   07/21/21 1130 -- (!) 111 24 -- 92 %         Exam:   General Appearance: Intubated  HEENT: SAMANTHA. HEAD: Atraumatic  NECK: No JVD, no thyroidomeglay. LUNGS: Bilateral air entry positive with decreased breath sound at bases   HEART: S1+S2     ABD: Non-tender, BS Audible    EXT: No edema, and no cysnosis. VASCULAR EXAM: Pulses are intact. PSYCHIATRIC EXAM: Intubated  MUSCULOSKELETAL: Grossly no joint deformity.   NEUROLOGICAL: Limited due to intubation  Review of Data:   LABS:   Lab Results   Component Value Date/Time    WBC 13.5 (H) 07/21/2021 07:04 AM    HGB 16.9 (H) 07/21/2021 07:04 AM    HCT 52.6 (H) 07/21/2021 07:04 AM    PLATELET 780 18/68/1810 07:04 AM     Lab Results   Component Value Date/Time    Sodium 139 07/21/2021 07:04 AM    Potassium 4.0 07/21/2021 07:04 AM    Chloride 99 (L) 07/21/2021 07:04 AM    CO2 24 07/21/2021 07:04 AM    Glucose 135 (H) 07/21/2021 07:04 AM    BUN 20 (H) 07/21/2021 07:04 AM Creatinine 1.80 (H) 07/21/2021 07:04 AM     No results found for: CHOL, CHOLX, CHLST, CHOLV, HDL, HDLP, LDL, LDLC, DLDLP, TGLX, TRIGL, TRIGP  No components found for: GPT  Lab Results   Component Value Date/Time    Hemoglobin A1c 5.0 07/21/2021 07:04 AM       RADIOLOGY:  CT Results  (Last 48 hours)               07/21/21 0850  CT HEAD WO CONT Final result    Impression:      No acute intracranial abnormality. Right maxillary sinus mucosal thickening. Fluid secretions within the posterior   nasopharynx. Narrative:  EXAM: CT head       INDICATION: Altered mental status. COMPARISON: None. TECHNIQUE: Axial CT imaging of the head was performed without intravenous   contrast. Standard multiplanar coronal and sagittal reformatted images were   obtained and are included in interpretation. One or more dose reduction techniques were used on this CT: automated exposure   control, adjustment of the mAs and/or kVp according to patient size, and   iterative reconstruction techniques. The specific techniques used on this CT   exam have been documented in the patient's electronic medical record. Digital   Imaging and Communications in Medicine (DICOM) format image data are available   to nonaffiliated external healthcare facilities or entities on a secure, media   free, reciprocally searchable basis with patient authorization for at least a   12-month period after this study. _______________       FINDINGS:       BRAIN AND POSTERIOR FOSSA:   Brain       EXTRA-AXIAL SPACES AND MENINGES: There are no abnormal extra-axial fluid   collections. CALVARIUM: Intact. SINUSES: Right maxillary sinus mucosal thickening. OTHER: Fluid secretions within the posterior nasopharynx. _______________               CXR Results  (Last 48 hours)               07/21/21 0732  XR CHEST PORT Final result    Impression:      Extensive bilateral parenchymal infiltrates.        Narrative:  EXAM: XR CHEST PORT       CLINICAL INDICATION/HISTORY: meets SIRS criteria   -Additional: None       COMPARISON: 12/20/2017       TECHNIQUE: Portable frontal view of the chest       _______________       FINDINGS:       SUPPORT DEVICES: Endotracheal tube in the midthoracic trachea. [place] HEART AND MEDIASTINUM: Cardiomediastinal silhouette within normal limits. LUNGS AND PLEURAL SPACES: Extensive bilateral parenchymal infiltrates. No large   effusion or pneumothorax.       _______________                   Cardiology Procedures:   Results for orders placed or performed during the hospital encounter of 07/21/21   EKG, 12 LEAD, INITIAL   Result Value Ref Range    Ventricular Rate 130 BPM    Atrial Rate 130 BPM    P-R Interval 134 ms    QRS Duration 80 ms    Q-T Interval 318 ms    QTC Calculation (Bezet) 467 ms    Calculated P Axis 77 degrees    Calculated R Axis 90 degrees    Calculated T Axis 60 degrees    Diagnosis       Sinus tachycardia with fusion complexes  Rightward axis  Borderline ECG  When compared with ECG of 28-DEC-2017 14:54,  fusion complexes are now present  Vent.  rate has increased BY  57 BPM  Non-specific change in ST segment in Inferior leads        Echo Results  (Last 48 hours)    None       Cardiolite (Tc-99m Sestamibi) stress test        Impression / Plan:    Patient Active Problem List   Diagnosis Code    Polysubstance abuse ([facility] F19.10    Drug overdose, multiple drugs T50.911A    Elevated LFTs R79.89    Overdose of heroin ([facility] T40.1X1A    EZEQUIEL (acute kidney injury) ([facility] N17.9    ATN (acute tubular necrosis) ([facility]) N17.0    Elevated troponin R77.8    Lactic acidosis E87.2    Encephalopathy G93.40    Acute respiratory failure with hypoxia and hypercapnia (HCC) J96.01, J96.02    Hypokalemia E87.6    ARDS (adult respiratory distress syndrome) ([facility]) J80    Aspiration pneumonia (HCC) J69.0    Alcohol abuse F10.10    Hepatitis-C B19.20     Assessment and plan      Cardiomyopathy, EF 15%  Polysubstance abuse  Chronic alcohol abuse  Cocaine abuse  Hepatitis C      Plan. Cardiomyopathy due to possible multifactorial including nonischemic due to alcoholic cardiomyopathy, tachycardia mediated due to cocaine and drug abuse. Patient will need ischemic evaluation before discharge. Continue with current supportive treatment and Levophed. Patient have good urine output. Avoid beta-blocker because of positive cocaine  Continue ventilatory support  Continue with antibiotic treatment  Discussed with patient's mother  Discussed with Dr. Kaden Arnett  Patient will need further cardiac ischemic coronary assessment depending upon clinical course                  45 minutes of critical care time spent in the direct evaluation and treatment of this high risk patient. The reason for providing this level of medical care for this critically ill patient was due a critical illness that impaired one or more vital organ systems such that there was a high probability of imminent or life threatening deterioration in the patients condition. This care involved high complexity decision making to assess, manipulate, and support vital system functions, to treat this degreee vital organ system failure and to prevent further life threatening deterioration of the patients condition.         Signed By: Mandi Joe MD     July 21, 2021

## 2021-07-21 NOTE — PROGRESS NOTES
Pulmonary/critical care    Echocardiogram shows EF of 15-20%. Cardiology consulted.     Katie Goldsmith MD

## 2021-07-21 NOTE — Clinical Note
TRANSFER - OUT REPORT:     Verbal report given to: rn.     Report consisted of patient's Situation, Background, Assessment and   Recommendations(SBAR). Opportunity for questions and clarification was provided. Patient transported with a Registered Nurse. Patient transported to: care unit.

## 2021-07-21 NOTE — PROCEDURES
[facility]  Femoral Artery Line Procedure Note with US guidance    Indication:   Acute respiratory failure with hypoxemia  Cardiogenic shock     Risks, benefits, alternatives explained and consent obtained from patient father and mother and reviewed risks of bleeding, hematoma, risks of distal limb ischemia; risks of complication versus benefit of invasive monitoring with arterial line in patient with severe shock state reviewed; patient on 2 pressors; patient EF 10 to 15%. Patient positioned in supine position. Full sterile barrier precautions used. Sterility Protocol followed. (cap, mask sterile gown, sterile gloves, large sterile sheet, hand hygiene, 2% chlorhexidine for cutaneous antisepsis, sterile probe cover). Local anesthetic with Lidocaine 1% - about 4 cc. Groin site used due to poor radial pulsation. Using ultrasound guidance, 18 ga cath placed and secured at 16 cm. Right femoral artery cannulated utilizing the Seldinger technique-site below the right groin fold. Number of attempts x 2  Position of wire confirmed in artery using US before dilating. Wire was removed. Complications-small hematoma right groin. Good pulsatile blood return, carefully flushed. Catheter secured & Biopatch applied. Sterile Tegaderm placed. Ok to use line - good arterial waveform  Monitor for distal ischemia; patient has good DP and posterior tibial pulsations in the right leg.     Rosie West MD

## 2021-07-21 NOTE — CONSULTS
[facility]ists  Pulmonary, Critical Care, and Sleep Medicine    Name: Nola Frost MRN: [id number]   : 1985 Hospital: [facility] [place]    Date: 2021  Room: 03/[address] [place] [address] Note                                              Consult requesting physician: Dr. Sam Tse  Reason for Consult: Acute respiratory failure; overdose      Subjective/History of Present Illness:     Patient is a 30 y.o. male with PMHx significant for diabetes mellitus type 2, substance abuse, anxiety was found down by girlfriend of unknown duration. Patient was unresponsive. The girlfriend tried to give Narcan with no response. The police came in gave some Narcan with some improvement in mentation. When EMS arrived, patient was poorly responsive, and very hypoxemic. He was brought to the emergency room where he remained unresponsive and hypoxemic. He needed to be intubated. Postintubation, he remained severely hypoxemic with sats in the 50s to 60s. Patient seen in the ER. He is currently on a ventilator machine with orotracheal tube. He is on sedation, and s/p paralytic to improve oxygenation and ventilator synchrony. No reports of significant emesis, or significant suctioning from ET tube per RT. Afebrile; telemetry shows sinus tachycardia; blood pressure stable. Patient just had a Orellana catheter placed. Review of symptoms  Known patient with polysubstance abuse. Prior UDS has shown amphetamines, benzodiazepines, cocaine and opiates. Limited review of symptoms due to patient condition. No Known Allergies   Past Medical History:   Diagnosis Date    Drug abuse, IV ([facility]     Heroin abuse ([facility]     Psychiatric diagnosis     ADHD    Psychiatric diagnosis     drug abuse.  Psychiatric disorder     anxiety      No past surgical history on file.    Social History     Tobacco Use    Smoking status: Current Every Day Smoker     Packs/day: 1.00    Smokeless tobacco: Never Used   Substance Use Topics    Alcohol use: Yes     Comment: daily      History reviewed. No pertinent family history. Prior to Admission medications    Medication Sig Start Date End Date Taking? Authorizing Provider   amphetamine-dextroamphetamine XR (Adderall XR) 20 mg XR capsule Take 20 mg by mouth three (3) times daily. Other, MD Celia     Current Facility-Administered Medications   Medication Dose Route Frequency    piperacillin-tazobactam (ZOSYN) 4.5 g in 0.9% sodium chloride (MBP/ADV) 100 mL MBP  4.5 g IntraVENous Q6H    levoFLOXacin (LEVAQUIN) 750 mg in D5W IVPB  750 mg IntraVENous Q24H    vancomycin (VANCOCIN) 1,000 mg in 0.9% sodium chloride 250 mL (VIAL-MATE)  1,000 mg IntraVENous ONCE    clindamycin (CLEOCIN) 600mg NS 50 mL IVPB (premix)  600 mg IntraVENous Q8H    propofol (DIPRIVAN) 10 mg/mL infusion  0-50 mcg/kg/min IntraVENous TITRATE    furosemide (LASIX) injection 40 mg  40 mg IntraVENous NOW    LORazepam (ATIVAN) injection 2 mg  2 mg IntraVENous NOW    fentaNYL (PF) 900 mcg/30 ml infusion soln  0-200 mcg/hr IntraVENous TITRATE         Objective:   Vital Signs:    Visit Vitals  /69   Pulse (!) 130   Temp 97.5 °F (36.4 °C)   Resp 24   Wt 81.6 kg (180 lb)   SpO2 90%   BMI 27.37 kg/m²       O2 Device: Ventilator   O2 Flow Rate (L/min): 15 l/min   Temp (24hrs), Av.5 °F (36.4 °C), Min:97.5 °F (36.4 °C), Max:97.5 °F (36.4 °C)       Intake/Output:   Last shift:      No intake/output data recorded. Last 3 shifts: No intake/output data recorded.     No intake or output data in the 24 hours ending 21 0812    Last 3 Recorded Weights in this Encounter    21 0700   Weight: 81.6 kg (180 lb)       ABG:    Lab Results   Component Value Date/Time    PHI 7.15 (LL) 2021 07:32 AM    PCO2I 54.2 (H) 2021 07:32 AM    PO2I 31 (LL) 2021 07:32 AM    HCO3I 18.7 (L) 2021 07:32 AM    FIO2I 100 2021 07:32 AM     Ventilator Mode: Pressure control  Respiratory Rate  Back-Up Rate: 20  Insp Time (sec): 1.02 sec  I:E Ratio: 1:1.5  Ventilator Volumes  Vt Exhaled (Machine Breath) (ml): 609 ml  Ve Observed (l/min): 12.1 l/min  Ventilator Pressures  PC Set: 16  PIP Observed (cm H2O): 30 cm H2O  Plateau Pressure (cm H2O): 24 cm H2O  MAP (cm H2O): 17  PEEP/VENT (cm H2O): 10 cm H20       Physical Exam:   Patient on ventilator support; acyanotic; no obvious facial or head injuries  HEENT: pupils not dilated, reactive, no scleral jaundice, moist oral mucosa, no nasal drainage; neck supple  Neck: No adenopathy or thyroid swelling  Chest Wall-no obvious injuries or bruises  CVS: S1S2 no murmurs; JVD not elevated; telemetry-sinus tachycardia  RS: Mod air entry bilaterally, no wheezes, fine bilateral crackles, ventilator synchrony with sedation and paralytic  Abd: soft, non tender, no hepatosplenomegaly, no abd distension, no guarding or rigidity, bowel sounds heard; no obvious injuries or bruises  Neuro: Intubated, sedated and on paralytic; limited exam; pupils not dilated  Extrm: no leg edema or swelling or clubbing  Skin: no rash  Lymphatic: no cervical or supraclavicular adenopathy  Vasc: DPs palpable bilaterally; good capillary refill in the toes  MS: No joint swelling      Data:       Recent Results (from the past 24 hour(s))   METABOLIC PANEL, COMPREHENSIVE    Collection Time: 07/21/21  7:04 AM   Result Value Ref Range    Sodium 139 136 - 145 mmol/L    Potassium 4.0 3.5 - 5.5 mmol/L    Chloride 99 (L) 100 - 111 mmol/L    CO2 24 21 - 32 mmol/L    Anion gap 16 3.0 - 18 mmol/L    Glucose 135 (H) 74 - 99 mg/dL    BUN 20 (H) 7.0 - 18 MG/DL    Creatinine 1.80 (H) 0.6 - 1.3 MG/DL    BUN/Creatinine ratio 11 (L) 12 - 20      GFR est AA 53 (L) >60 ml/min/1.73m2    GFR est non-AA 43 (L) >60 ml/min/1.73m2    Calcium 9.4 8.5 - 10.1 MG/DL    Bilirubin, total PENDING MG/DL    ALT (SGPT) 143 (H) 16 - 61 U/L    AST (SGOT) 187 (H) 10 - 38 U/L    Alk.  phosphatase PENDING U/L Protein, total PENDING g/dL    Albumin 4.4 3.4 - 5.0 g/dL    Globulin PENDING g/dL    A-G Ratio PENDING     CBC WITH AUTOMATED DIFF    Collection Time: 07/21/21  7:04 AM   Result Value Ref Range    WBC 13.5 (H) 4.6 - 13.2 K/uL    RBC 5.61 4.35 - 5.65 M/uL    HGB 16.9 (H) 13.0 - 16.0 g/dL    HCT 52.6 (H) 36.0 - 48.0 %    MCV 93.8 74.0 - 97.0 FL    MCH 30.1 24.0 - 34.0 PG    MCHC 32.1 31.0 - 37.0 g/dL    RDW 11.4 (L) 11.6 - 14.5 %    PLATELET 093 127 - 867 K/uL    MPV 10.7 9.2 - 11.8 FL    NEUTROPHILS 63 40 - 73 %    BAND NEUTROPHILS 16 (H) 0 - 5 %    LYMPHOCYTES 19 (L) 21 - 52 %    MONOCYTES 0 (L) 3 - 10 %    EOSINOPHILS 2 0 - 5 %    BASOPHILS 0 0 - 2 %    ABS. NEUTROPHILS 10.6 (H) 1.8 - 8.0 K/UL    ABS. LYMPHOCYTES 2.6 0.9 - 3.6 K/UL    ABS. MONOCYTES 0.0 (L) 0.05 - 1.2 K/UL    ABS. EOSINOPHILS 0.3 0.0 - 0.4 K/UL    ABS.  BASOPHILS 0.0 0.0 - 0.1 K/UL    DF MANUAL      RBC COMMENTS NORMOCYTIC, NORMOCHROMIC     MAGNESIUM    Collection Time: 07/21/21  7:04 AM   Result Value Ref Range    Magnesium 3.1 (H) 1.6 - 2.6 mg/dL   PROTHROMBIN TIME + INR    Collection Time: 07/21/21  7:04 AM   Result Value Ref Range    Prothrombin time 14.5 11.5 - 15.2 sec    INR 1.2 0.8 - 1.2     PTT    Collection Time: 07/21/21  7:04 AM   Result Value Ref Range    aPTT 29.6 23.0 - 36.4 SEC   CARDIAC PANEL,(CK, CKMB & TROPONIN)    Collection Time: 07/21/21  7:04 AM   Result Value Ref Range    CK - MB 2.4 <3.6 ng/ml    CK-MB Index 0.6 0.0 - 4.0 %     (H) 39 - 308 U/L    Troponin-I, QT 0.03 0.0 - 0.045 NG/ML   ETHYL ALCOHOL    Collection Time: 07/21/21  7:04 AM   Result Value Ref Range    ALCOHOL(ETHYL),SERUM 17 (H) 0 - 3 MG/DL   NT-PRO BNP    Collection Time: 07/21/21  7:04 AM   Result Value Ref Range    NT pro-BNP 13 0 - 450 PG/ML   POC LACTIC ACID    Collection Time: 07/21/21  7:13 AM   Result Value Ref Range    Lactic Acid (POC) 9.60 (HH) 0.40 - 2.00 mmol/L   POC LACTIC ACID    Collection Time: 07/21/21  7:21 AM   Result Value Ref Range Lactic Acid (POC) 9.48 (HH) 0.40 - 2.00 mmol/L   BLOOD GAS, ARTERIAL POC    Collection Time: 07/21/21  7:32 AM   Result Value Ref Range    FIO2 (POC) 100 %    pH (POC) 7.15 (LL) 7.35 - 7.45      pCO2 (POC) 54.2 (H) 35.0 - 45.0 MMHG    pO2 (POC) 31 (LL) 80 - 100 MMHG    HCO3 (POC) 18.7 (L) 22 - 26 MMOL/L    sO2 (POC) 42.6 (L) 92 - 97 %    Base deficit (POC) 10.7 mmol/L    Mode ASSIST CONTROL      Tidal volume 500 ml    Set Rate 24 bpm    PEEP/CPAP (POC) 10 cmH2O    Site RIGHT RADIAL      Specimen type (POC) ARTERIAL      Performed by Sakshi Valencia [facility])    URINALYSIS W/ RFLX MICROSCOPIC    Collection Time: 07/21/21  8:00 AM   Result Value Ref Range    Color YELLOW      Appearance CLEAR      Specific gravity 1.009 1.005 - 1.030      pH (UA) 5.5 5.0 - 8.0      Protein Negative NEG mg/dL    Glucose Negative NEG mg/dL    Ketone Negative NEG mg/dL    Bilirubin Negative NEG      Blood Negative NEG      Urobilinogen 0.2 0.2 - 1.0 EU/dL    Nitrites Negative NEG      Leukocyte Esterase Negative NEG     DRUG SCREEN, URINE    Collection Time: 07/21/21  8:00 AM   Result Value Ref Range    BENZODIAZEPINES Negative NEG      BARBITURATES Negative NEG      THC (TH-CANNABINOL) Negative NEG      OPIATES Negative NEG      PCP(PHENCYCLIDINE) Negative NEG      COCAINE Positive (A) NEG      AMPHETAMINES Positive (A) NEG      METHADONE Negative NEG      HDSCOM (NOTE)    EKG, 12 LEAD, INITIAL    Collection Time: 07/21/21  8:01 AM   Result Value Ref Range    Ventricular Rate 130 BPM    Atrial Rate 130 BPM    P-R Interval 134 ms    QRS Duration 80 ms    Q-T Interval 318 ms    QTC Calculation (Bezet) 467 ms    Calculated P Axis 77 degrees    Calculated R Axis 90 degrees    Calculated T Axis 60 degrees    Diagnosis       Sinus tachycardia with fusion complexes  Rightward axis  Borderline ECG  When compared with ECG of 28-DEC-2017 14:54,  fusion complexes are now present  Vent.  rate has increased BY  57 BPM  Non-specific change in ST segment in Inferior leads           Chemistry Recent Labs     07/21/21  0704   *      K 4.0   CL 99*   CO2 24   BUN 20*   CREA 1.80*   CA 9.4   MG 3.1*   AGAP 16   BUCR 11*   AP PENDING   TP PENDING   ALB 4.4   GLOB PENDING   AGRAT PENDING        Lactic Acid Lactic acid   Date Value Ref Range Status   03/12/2016 1.3 0.4 - 2.0 MMOL/L Final     No results for input(s): LAC in the last 72 hours. Liver Enzymes Protein, total   Date Value Ref Range Status   07/21/2021 PENDING g/dL Incomplete     Albumin   Date Value Ref Range Status   07/21/2021 4.4 3.4 - 5.0 g/dL Final     Globulin   Date Value Ref Range Status   07/21/2021 PENDING g/dL Incomplete     A-G Ratio   Date Value Ref Range Status   07/21/2021 PENDING   Incomplete     Alk.  phosphatase   Date Value Ref Range Status   07/21/2021 PENDING U/L Incomplete     Recent Labs     07/21/21  0704   TP PENDING   ALB 4.4   GLOB PENDING   AGRAT PENDING   AP PENDING        CBC w/Diff Recent Labs     07/21/21  0704   WBC 13.5*   RBC 5.61   HGB 16.9*   HCT 52.6*      GRANS PENDING   LYMPH PENDING   EOS PENDING        Cardiac Enzymes No results found for: CPK, CK, CKMMB, CKMB, RCK3, CKMBT, CKNDX, CKND1, BUD, TROPT, TROIQ, NICOLE, TROPT, TNIPOC, BNP, BNPP     BNP No results found for: BNP, BNPP, XBNPT     Coagulation Recent Labs     07/21/21  0704   PTP 14.5   INR 1.2   APTT 29.6         Thyroid  Lab Results   Component Value Date/Time    TSH 1.49 03/11/2016 02:55 AM       No results found for: T4     Urinalysis Lab Results   Component Value Date/Time    Color YELLOW 06/25/2021 03:00 PM    Appearance CLEAR 06/25/2021 03:00 PM    Specific gravity 1.023 06/25/2021 03:00 PM    pH (UA) 8.0 06/25/2021 03:00 PM    Protein Negative 06/25/2021 03:00 PM    Glucose Negative 06/25/2021 03:00 PM    Ketone Negative 06/25/2021 03:00 PM    Bilirubin Negative 06/25/2021 03:00 PM    Urobilinogen 1.0 06/25/2021 03:00 PM    Nitrites Negative 06/25/2021 03:00 PM    Leukocyte Esterase Negative 06/25/2021 03:00 PM          June 2013  Hep C virus Ab Interp. NEGATIVE POSITIVEAbnormal         Culture data during this hospitalization. All Micro Results     Procedure Component Value Units Date/Time    CULTURE, BLOOD [[id number]] Collected: 07/21/21 0704    Order Status: Completed Specimen: Blood Updated: 07/21/21 0751    CULTURE, BLOOD [[id number]] Collected: 07/21/21 0730    Order Status: Completed Specimen: Blood Updated: 07/21/21 0751           ECHO       Images report reviewed by me:  CT (Most Recent) (CT chest reviewed by me) Results from [place] encounter on 06/25/21    CT ABD PELV W CONT    Narrative  EXAM: CT of the abdomen and pelvis    INDICATION: Pain. COMPARISON: None. TECHNIQUE: Axial CT imaging of the abdomen and pelvis was performed with  intravenous contrast. Multiplanar reformats were generated. One or more dose reduction techniques were used on this CT: automated exposure  control, adjustment of the mAs and/or kVp according to patient size, and  iterative reconstruction techniques. The specific techniques used on this CT  exam have been documented in the patient's electronic medical record. Digital  Imaging and Communications in Medicine (DICOM) format image data are available  to nonaffiliated external healthcare facilities or entities on a secure, media  free, reciprocally searchable basis with patient authorization for at least a  12-month period after this study. _______________    FINDINGS:    LOWER CHEST: Unremarkable. LIVER, BILIARY: Liver is normal. No biliary dilation. Gallbladder is  unremarkable. PANCREAS: Normal.    SPLEEN: Normal.    ADRENALS: Normal.    KIDNEYS/URETERS/BLADDER: No calcification, hydronephrosis, or soft tissue  attenuation renal mass. PELVIC ORGANS: Unremarkable. VASCULATURE: Unremarkable    LYMPH NODES: No enlarged lymph nodes. GASTROINTESTINAL TRACT: No bowel dilation or wall thickening.  Normal appendix. BONES: No acute or aggressive osseous abnormalities identified. OTHER: None.    _______________    Impression  No acute intra-abdominal abnormality. CXR reviewed by me:  XR (Most Recent). CXR  reviewed by me and compared with previous CXR Results from Hospital Encounter encounter on 07/21/21    XR CHEST PORT    Narrative  EXAM: XR CHEST PORT    CLINICAL INDICATION/HISTORY: meets SIRS criteria  -Additional: None    COMPARISON: 12/20/2017    TECHNIQUE: Portable frontal view of the chest    _______________    FINDINGS:    SUPPORT DEVICES: Endotracheal tube in the midthoracic trachea. Asif Golas [facility]STIN[facility]: Cardiomediastinal silhouette within normal limits. LUNGS AND PLEURAL SPACES: Extensive bilateral parenchymal infiltrates. No large  effusion or pneumothorax.    _______________    Impression  Extensive bilateral parenchymal infiltrates.            IMPRESSION:   · Acute respiratory failure with hypoxemia and hypercapnia  · ARDS  · Drug overdose  · Aspiration pneumonia  · Polysubstance abuse-cocaine, amphetamine  · Lactic acidosis  · Acute renal failure  · Alcohol abuse  · Hepatitis C infection  ·   ·   Patient Active Problem List   Diagnosis Code    Polysubstance abuse ([facility] F19.10    Drug overdose, multiple drugs T50.911A    Elevated LFTs R79.89    Overdose of heroin ([facility] T40.1X1A    EZEQUIEL (acute kidney injury) ([facility] N17.9    ATN (acute tubular necrosis) ([facility]) N17.0    Elevated troponin R77.8    Lactic acidosis E87.2    Encephalopathy G93.40    Acute respiratory failure with hypoxia and hypercapnia (HCC) J96.01, J96.02    Hypokalemia E87.6    ARDS (adult respiratory distress syndrome) ([facility] J80    Aspiration pneumonia (HCC) J69.0    Alcohol abuse F10.10    Hepatitis-C B19.20       · Code status: Full code      RECOMMENDATIONS:   Respiratory: Patient intubated due to drug overdose and severe respiratory failure; severe hypoxemia post intubation; chest x-ray shows good ET tube position and severe bilateral opacities in groundglass densities-differential diagnosis of aspiration pneumonia/pneumonitis versus cocaine induced acute lung injury versus ARDS from aspiration pneumonia. PCV mode of ventilation-Pi adjusted to 16; PEEP 10; I:E ratio adjusted to keep I time high; flow pattern reviewed on ventilator and found to be stable with no significant intrinsic PEEP, and adequate exhalation time; FiO2 remains at 100%. Admission ABG shows severe hypoxemia and respiratory acidosis; repeat ABG in ICU. Ventilator and sedation bundles ordered. Sedation-propofol drip along with fentanyl for now; monitor blood pressure. Paralytic-cisatracurium ordered; follow train-of-four. Keep SPO2 >=90%. HOB 30 degree elevation all the time. Aggressive pulmonary toileting. Aspiration precautions. CVS: Monitor hemodynamics; maintenance IV fluids; check echocardiogram due to cocaine use. Follow cardiac panel. ID: Treat as high risk aspiration pneumonia with antibiotics-levofloxacin, Zosyn and IV vancomycin; based on radiological improvement, narrow antibiotics; follow cultures. Lactic acidemia likely due to severe hypoxemic state on arrival; follow lactic acid per protocol. Send respiratory cultures; follow blood cultures; send respiratory viral panel including SARS-CoV-2-low risk for SARS-CoV-2 infection based on clinical presentation with drug overdose. Hematology/Oncology: Monitor hemoglobin and platelets; coags normal.  No active bleeding issues. Renal: Monitor renal function and urine output; maintenance IV fluids; replace electrolytes per protocol. GI: N.p.o. for now; GI prophylaxis. Endocrine: Monitor BS. SSI. Neurology: Patient unresponsive on arrival; check CT head-pending. Toxicology: Urine drug screen positive for  cocaine and amphetamines; high suspicion for synthetic opiate; patient also alcoholic. *  Skin/Wound: ICU nursing care.   Electrolytes: Replace electrolytes per ICU electrolyte replacement protocol. IVF: Normal saline 75 mL/h  Nutrition: N.p.o. for now. Prophylaxis: DVT Prophylaxis: SCDs. GI Prophylaxis: Protonix. Restraints: none while on paralytic  Lines/Tubes: PIVs  ETT: 7/21   OGT/NGT: 7/21   Orellana: 7/21 (Medically necessary for strict input/output monitoring in critically ill patient, will remove it when not needed. Orellana bundle followed). Will defer respective systems problem management to primary and other respective consultant and follow patient in ICU with primary and other medical team.  Further recommendations will be based on the patient's response to recommended treatment and results of the investigation ordered. Quality Care: PPI, DVT prophylaxis, HOB elevated, Infection control all reviewed and addressed. Care of plan d/w Dr Venkatesh Cuellar in ER, ICU and ER nurses, RT. Update family when available. High complexity decision making was performed during the evaluation of this patient at high risk for decompensation with multiple organ involvement. Total critical care time spent rendering care exclusive of procedures/family discussion/coordination of care: 48 minutes. Please note: Voice-recognition software may have been used to generate this report, which may have resulted in some phonetic-based errors in grammar and contents. Even though attempts were made to correct all the mistakes, some may have been missed, and remained in the body of the document.       Italia Rudolph MD  7/21/2021

## 2021-07-21 NOTE — ROUTINE PROCESS
TRANSFER - OUT REPORT:    Verbal report given to Virgen Stafford RN(name) on [employer]  being transferred to [facility] for routine progression of care       Report consisted of patients Situation, Background, Assessment and   Recommendations(SBAR). Information from the following report(s) SBAR, MAR and Cardiac Rhythm st was reviewed with the receiving nurse. Lines:   Peripheral IV 07/21/21 Left Forearm (Active)   Site Assessment Clean, dry, & intact 07/21/21 0704   Phlebitis Assessment 0 07/21/21 0704   Infiltration Assessment 0 07/21/21 0704   Dressing Status Clean, dry, & intact 07/21/21 0704   Dressing Type Transparent 07/21/21 0704       Peripheral IV 07/21/21 Right Hand (Active)   Site Assessment Clean, dry, & intact 07/21/21 0711   Phlebitis Assessment 0 07/21/21 0711   Infiltration Assessment 0 07/21/21 0711   Dressing Status Clean, dry, & intact 07/21/21 0711   Dressing Type Transparent 07/21/21 0711       Peripheral IV 07/21/21 Right;Proximal Cephalic (Active)        Opportunity for questions and clarification was provided.       Patient transported with:   Monitor  Registered Nurse  Tech   Federico  RT

## 2021-07-21 NOTE — PROGRESS NOTES
Reason for Admission:   Chart reviewed; per H&P, patient is is [de-identified] 30 y.o. male with history of for polysubstance abuse was sent to ER due to unresponsiveness. He was found unresponsive per sister on pouch. EMS was called, he was found hypoxia, total 6 mg Narcan was administrated. He became a little bit respond. He was found hypoxia per EMS, nonrebreathing mask was put on. He become agitated in ER, high flow oxygen was put, but still hypoxia. He was intubated in ER with  nimbex administrated. Chest x-ray indicated bilateral infiltration. troponin x1 within normal limit. Creatinine 1.8. White count 13.5. CT head pending. Sepsis protocol was  started in ER, lactic acid was 9. Broad coverage IV antibiotics was administrated. \"                    RUR Score: Moderate, 21%              PCP: First and Last name:   Aimee Westbrook MD     Name of Practice:    Are you a current patient: Yes/No:    Approximate date of last visit:    Can you participate in a virtual visit if needed:     Do you (patient/family) have any concerns for transition/discharge? Plan for utilizing home health:       Current Advanced Directive/Advance Care Plan:  Full Code      Healthcare Decision Maker:   Click here to complete [address] including selection of the Healthcare Decision Maker Relationship (ie \"Primary\")    Al Serranoar, mother - Primary - [phone]              Transition of Care Plan:      Patient is newly admitted to ICU, intubated and on nimbex, levophed and versed drips. Care manager will follow as appropriate to assist with discharge planning.

## 2021-07-21 NOTE — PROGRESS NOTES
Patient arrived from ED via stretcher. He is intubated and sedated. Paralytic started. Monitoring BP and VS closely - found to be low. Documented in chart. Orellana is in place. Output is appropriate. Thick red sputum noted in ET tubing when suctioned. CVL and Art line placed by Dr. Milan Graff d/t issue with low Bps and the start of pressors.

## 2021-07-21 NOTE — ED TRIAGE NOTES
Patient arrives from home via EMS with drug overdose. Girlfriend found patient unconscious in the backyard, agonal breathing with pulse. Unknown downtime. Gave 2mg IN narcan. Police arrived and gave 4mg IN narcan. Per EMS, patient with GCS of 5 upon their arrival. When arrived in ED, patient making eye contact. Breathing 40 times a minute on NRB. Not speaking at this time.

## 2021-07-21 NOTE — ROUTINE PROCESS
[facility] Medic Code Sepsis  -   - Time of RRT: N/A  - Time of Code Sepsis: 0719  - Team:  Physician Dr. Leidy Farrell Nurse RN Zack MARKS  Supervisor OSIEL Jones  - SIRS # 1 [id number]  SIRS # 2 [id number]  - Possible source of infection: ASPIRATIONAL PNEUMONIA  - Organ dysfunction related to sepsis: LA > 2  - Labs:  (pull in Chem 7 and CBC)  - Initial Vitals: (pull in from EPIC)  - Blood Cultures collected times 2 OR collected within last 24 hours:  1ST SET @ 0704, 2ND SET @ 0730  - Lactic Acid Collection time OR within last 6 hours: 0713  - Antibiotic Start time: 0731  - Lactic Acid Result: 9.60  - Target Fluid Bolus Amount: 2,448 ml  - Time of Fluid Bolus initiated: TBD  - Reason for no target fluid bolus: ASPIRATIONAL PNEUMONIA  - New PIV / or line: YES    -  Time of Fluid Bolus Completion: TBD  - Cardiopulmonary response after fluid completion: TBD  - Time of Repeat Lactic Acid: 0721  - Repeat Lactic Acid Result: 9.48  - Repeat Vitals: (pull in from EPIC)   - Vasopressors Needed? - Debriefed with RN NOTHING MORE NEEDED    - Additional Notes: GAINED ADDITIONAL VASCULAR ACCESS @ [address]  - Transfer to higher level of care?  YES, PATIENT ADMITTED TO icu BED1

## 2021-07-22 ENCOUNTER — APPOINTMENT (OUTPATIENT)
Dept: GENERAL RADIOLOGY | Age: 36
DRG: 917 | End: 2021-07-22
Attending: INTERNAL MEDICINE
Payer: COMMERCIAL

## 2021-07-22 LAB
ALBUMIN SERPL-MCNC: 3 G/DL (ref 3.4–5)
ALBUMIN/GLOB SERPL: 1.2 {RATIO} (ref 0.8–1.7)
ALP SERPL-CCNC: 75 U/L (ref 45–117)
ALT SERPL-CCNC: 150 U/L (ref 16–61)
ANION GAP SERPL CALC-SCNC: 8 MMOL/L (ref 3–18)
ARTERIAL PATENCY WRIST A: ABNORMAL
AST SERPL-CCNC: 118 U/L (ref 10–38)
BASE DEFICIT BLD-SCNC: 4.1 MMOL/L
BASE DEFICIT BLDV-SCNC: 3.8 MMOL/L
BASOPHILS # BLD: 0 K/UL (ref 0–0.1)
BASOPHILS NFR BLD: 0 % (ref 0–2)
BDY SITE: ABNORMAL
BDY SITE: ABNORMAL
BILIRUB SERPL-MCNC: 1.4 MG/DL (ref 0.2–1)
BUN SERPL-MCNC: 21 MG/DL (ref 7–18)
BUN/CREAT SERPL: 21 (ref 12–20)
CA-I SERPL-SCNC: 1.15 MMOL/L (ref 1.12–1.32)
CALCIUM SERPL-MCNC: 8.5 MG/DL (ref 8.5–10.1)
CHLORIDE SERPL-SCNC: 108 MMOL/L (ref 100–111)
CO2 SERPL-SCNC: 22 MMOL/L (ref 21–32)
CREAT SERPL-MCNC: 1.01 MG/DL (ref 0.6–1.3)
DIFFERENTIAL METHOD BLD: ABNORMAL
EOSINOPHIL # BLD: 0 K/UL (ref 0–0.4)
EOSINOPHIL NFR BLD: 0 % (ref 0–5)
ERYTHROCYTE [DISTWIDTH] IN BLOOD BY AUTOMATED COUNT: 11.8 % (ref 11.6–14.5)
GAS FLOW.O2 O2 DELIVERY SYS: ABNORMAL L/MIN
GAS FLOW.O2 O2 DELIVERY SYS: ABNORMAL L/MIN
GAS FLOW.O2 SETTING OXYMISER: 16 BPM
GAS FLOW.O2 SETTING OXYMISER: 24 BPM
GLOBULIN SER CALC-MCNC: 2.6 G/DL (ref 2–4)
GLUCOSE BLD STRIP.AUTO-MCNC: 100 MG/DL (ref 70–110)
GLUCOSE BLD STRIP.AUTO-MCNC: 94 MG/DL (ref 70–110)
GLUCOSE BLD STRIP.AUTO-MCNC: 99 MG/DL (ref 70–110)
GLUCOSE BLD STRIP.AUTO-MCNC: 99 MG/DL (ref 70–110)
GLUCOSE SERPL-MCNC: 103 MG/DL (ref 74–99)
HCO3 BLD-SCNC: 20 MMOL/L (ref 22–26)
HCO3 BLDV-SCNC: 20.8 MMOL/L (ref 23–28)
HCT VFR BLD AUTO: 41.7 % (ref 36–48)
HGB BLD-MCNC: 13.9 G/DL (ref 13–16)
INSPIRATION.DURATION SETTING TIME VENT: 0.9 SEC
LACTATE SERPL-SCNC: 1.8 MMOL/L (ref 0.4–2)
LYMPHOCYTES # BLD: 0 K/UL (ref 0.9–3.6)
LYMPHOCYTES NFR BLD: 0 % (ref 21–52)
MAGNESIUM SERPL-MCNC: 1.8 MG/DL (ref 1.6–2.6)
MAGNESIUM SERPL-MCNC: 2.2 MG/DL (ref 1.6–2.6)
MCH RBC QN AUTO: 30 PG (ref 24–34)
MCHC RBC AUTO-ENTMCNC: 33.3 G/DL (ref 31–37)
MCV RBC AUTO: 89.9 FL (ref 74–97)
METAMYELOCYTES NFR BLD MANUAL: 10 %
MONOCYTES # BLD: 0.9 K/UL (ref 0.05–1.2)
MONOCYTES NFR BLD: 5 % (ref 3–10)
MYELOCYTES NFR BLD MANUAL: 1 %
NEUTS BAND NFR BLD MANUAL: 29 % (ref 0–5)
NEUTS SEG # BLD: 14.5 K/UL (ref 1.8–8)
NEUTS SEG NFR BLD: 55 % (ref 40–73)
O2/TOTAL GAS SETTING VFR VENT: 40 %
O2/TOTAL GAS SETTING VFR VENT: 80 %
PCO2 BLD: 32.7 MMHG (ref 35–45)
PCO2 BLDV: 35.3 MMHG (ref 41–51)
PEEP RESPIRATORY: 5 CMH2O
PEEP RESPIRATORY: 8 CMH2O
PH BLD: 7.39 [PH] (ref 7.35–7.45)
PH BLDV: 7.38 [PH] (ref 7.32–7.42)
PHOSPHATE SERPL-MCNC: 2.4 MG/DL (ref 2.5–4.9)
PHOSPHATE SERPL-MCNC: 2.4 MG/DL (ref 2.5–4.9)
PIP ISTAT,IPIP: 27
PLATELET # BLD AUTO: 146 K/UL (ref 135–420)
PLATELET COMMENTS,PCOM: ABNORMAL
PMV BLD AUTO: 11.1 FL (ref 9.2–11.8)
PO2 BLD: 340 MMHG (ref 80–100)
PO2 BLDV: 89 MMHG (ref 25–40)
POTASSIUM SERPL-SCNC: 4.2 MMOL/L (ref 3.5–5.5)
PROT SERPL-MCNC: 5.6 G/DL (ref 6.4–8.2)
RBC # BLD AUTO: 4.64 M/UL (ref 4.35–5.65)
RBC MORPH BLD: ABNORMAL
SAO2 % BLD: 99.9 % (ref 92–97)
SAO2 % BLDV: 96.7 % (ref 65–88)
SERVICE CMNT-IMP: ABNORMAL
SERVICE CMNT-IMP: ABNORMAL
SODIUM SERPL-SCNC: 138 MMOL/L (ref 136–145)
SPECIMEN TYPE: ABNORMAL
SPECIMEN TYPE: ABNORMAL
VENTILATION MODE VENT: ABNORMAL
VENTILATION MODE VENT: ABNORMAL
VT SETTING VENT: 608 ML
WBC # BLD AUTO: 17.3 K/UL (ref 4.6–13.2)

## 2021-07-22 PROCEDURE — 65610000006 HC RM INTENSIVE CARE

## 2021-07-22 PROCEDURE — 74011250636 HC RX REV CODE- 250/636: Performed by: HOSPITALIST

## 2021-07-22 PROCEDURE — 74011250636 HC RX REV CODE- 250/636: Performed by: INTERNAL MEDICINE

## 2021-07-22 PROCEDURE — 82803 BLOOD GASES ANY COMBINATION: CPT

## 2021-07-22 PROCEDURE — 77010033678 HC OXYGEN DAILY

## 2021-07-22 PROCEDURE — 74011250636 HC RX REV CODE- 250/636: Performed by: EMERGENCY MEDICINE

## 2021-07-22 PROCEDURE — 36415 COLL VENOUS BLD VENIPUNCTURE: CPT

## 2021-07-22 PROCEDURE — 85025 COMPLETE CBC W/AUTO DIFF WBC: CPT

## 2021-07-22 PROCEDURE — 71045 X-RAY EXAM CHEST 1 VIEW: CPT

## 2021-07-22 PROCEDURE — 80053 COMPREHEN METABOLIC PANEL: CPT

## 2021-07-22 PROCEDURE — 94003 VENT MGMT INPAT SUBQ DAY: CPT

## 2021-07-22 PROCEDURE — 83735 ASSAY OF MAGNESIUM: CPT

## 2021-07-22 PROCEDURE — 74011000258 HC RX REV CODE- 258: Performed by: EMERGENCY MEDICINE

## 2021-07-22 PROCEDURE — 84100 ASSAY OF PHOSPHORUS: CPT

## 2021-07-22 PROCEDURE — 74011000250 HC RX REV CODE- 250: Performed by: INTERNAL MEDICINE

## 2021-07-22 PROCEDURE — 74011000258 HC RX REV CODE- 258: Performed by: INTERNAL MEDICINE

## 2021-07-22 PROCEDURE — 74011250637 HC RX REV CODE- 250/637: Performed by: HOSPITALIST

## 2021-07-22 PROCEDURE — 83605 ASSAY OF LACTIC ACID: CPT

## 2021-07-22 PROCEDURE — 74011250637 HC RX REV CODE- 250/637: Performed by: INTERNAL MEDICINE

## 2021-07-22 PROCEDURE — 36600 WITHDRAWAL OF ARTERIAL BLOOD: CPT

## 2021-07-22 PROCEDURE — C9113 INJ PANTOPRAZOLE SODIUM, VIA: HCPCS | Performed by: INTERNAL MEDICINE

## 2021-07-22 PROCEDURE — 82962 GLUCOSE BLOOD TEST: CPT

## 2021-07-22 PROCEDURE — 82330 ASSAY OF CALCIUM: CPT

## 2021-07-22 RX ORDER — SODIUM,POTASSIUM PHOSPHATES 280-250MG
2 POWDER IN PACKET (EA) ORAL ONCE
Status: COMPLETED | OUTPATIENT
Start: 2021-07-22 | End: 2021-07-22

## 2021-07-22 RX ORDER — MAGNESIUM SULFATE 1 G/100ML
1 INJECTION INTRAVENOUS ONCE
Status: COMPLETED | OUTPATIENT
Start: 2021-07-22 | End: 2021-07-22

## 2021-07-22 RX ORDER — SODIUM,POTASSIUM PHOSPHATES 280-250MG
2 POWDER IN PACKET (EA) ORAL 4 TIMES DAILY
Status: COMPLETED | OUTPATIENT
Start: 2021-07-22 | End: 2021-07-22

## 2021-07-22 RX ORDER — PROPOFOL 10 MG/ML
5-50 VIAL (ML) INTRAVENOUS
Status: DISCONTINUED | OUTPATIENT
Start: 2021-07-22 | End: 2021-07-22

## 2021-07-22 RX ORDER — ENOXAPARIN SODIUM 100 MG/ML
40 INJECTION SUBCUTANEOUS EVERY 24 HOURS
Status: DISCONTINUED | OUTPATIENT
Start: 2021-07-22 | End: 2021-07-27 | Stop reason: HOSPADM

## 2021-07-22 RX ADMIN — PIPERACILLIN AND TAZOBACTAM 4.5 G: 4; .5 INJECTION, POWDER, LYOPHILIZED, FOR SOLUTION INTRAVENOUS; PARENTERAL at 18:00

## 2021-07-22 RX ADMIN — 0.12% CHLORHEXIDINE GLUCONATE 10 ML: 1.2 RINSE ORAL at 09:08

## 2021-07-22 RX ADMIN — FENTANYL CITRATE 75 MCG/HR: 0.05 INJECTION, SOLUTION INTRAMUSCULAR; INTRAVENOUS at 06:06

## 2021-07-22 RX ADMIN — PIPERACILLIN AND TAZOBACTAM 4.5 G: 4; .5 INJECTION, POWDER, LYOPHILIZED, FOR SOLUTION INTRAVENOUS; PARENTERAL at 13:02

## 2021-07-22 RX ADMIN — Medication 10 ML: at 14:15

## 2021-07-22 RX ADMIN — FENTANYL CITRATE 75 MCG/HR: 0.05 INJECTION, SOLUTION INTRAMUSCULAR; INTRAVENOUS at 16:03

## 2021-07-22 RX ADMIN — SODIUM CHLORIDE 40 MG: 9 INJECTION, SOLUTION INTRAMUSCULAR; INTRAVENOUS; SUBCUTANEOUS at 09:08

## 2021-07-22 RX ADMIN — HEPARIN SODIUM 5000 UNITS: 5000 INJECTION INTRAVENOUS; SUBCUTANEOUS at 01:05

## 2021-07-22 RX ADMIN — ENOXAPARIN SODIUM 40 MG: 40 INJECTION SUBCUTANEOUS at 09:26

## 2021-07-22 RX ADMIN — PIPERACILLIN AND TAZOBACTAM 4.5 G: 4; .5 INJECTION, POWDER, LYOPHILIZED, FOR SOLUTION INTRAVENOUS; PARENTERAL at 02:24

## 2021-07-22 RX ADMIN — MAGNESIUM SULFATE HEPTAHYDRATE 1 G: 1 INJECTION, SOLUTION INTRAVENOUS at 05:06

## 2021-07-22 RX ADMIN — CISATRACURIUM BESYLATE 2.5 MCG/KG/MIN: 2 INJECTION, SOLUTION INTRAVENOUS at 06:01

## 2021-07-22 RX ADMIN — 0.12% CHLORHEXIDINE GLUCONATE 10 ML: 1.2 RINSE ORAL at 21:00

## 2021-07-22 RX ADMIN — FENTANYL CITRATE 75 MCG/HR: 0.05 INJECTION, SOLUTION INTRAMUSCULAR; INTRAVENOUS at 00:14

## 2021-07-22 RX ADMIN — Medication 10 ML: at 14:16

## 2021-07-22 RX ADMIN — POTASSIUM & SODIUM PHOSPHATES POWDER PACK 280-160-250 MG 2 PACKET: 280-160-250 PACK at 18:00

## 2021-07-22 RX ADMIN — VANCOMYCIN HYDROCHLORIDE 1250 MG: 10 INJECTION, POWDER, LYOPHILIZED, FOR SOLUTION INTRAVENOUS at 01:06

## 2021-07-22 RX ADMIN — POTASSIUM & SODIUM PHOSPHATES POWDER PACK 280-160-250 MG 2 PACKET: 280-160-250 PACK at 05:06

## 2021-07-22 RX ADMIN — SODIUM CHLORIDE 75 ML/HR: 900 INJECTION, SOLUTION INTRAVENOUS at 12:54

## 2021-07-22 RX ADMIN — Medication 10 ML: at 21:43

## 2021-07-22 RX ADMIN — CISATRACURIUM BESYLATE 2.5 MCG/KG/MIN: 2 INJECTION, SOLUTION INTRAVENOUS at 00:05

## 2021-07-22 NOTE — PROGRESS NOTES
Cardiology Progress Note        Patient: Carol Ann Mariano        Sex: male          DOA: 7/21/2021  YOB: 1985      Age:  30 y.o.        LOS:  LOS: 1 day   Assessment/Plan     Principal Problem:    ARDS (adult respiratory distress syndrome) ([facility] (7/21/2021)    Active Problems:    Polysubstance abuse ([address] (6/15/2013)      Drug overdose, multiple drugs (6/15/2013)      EZEQUIEL (acute kidney injury) ([facility] [address] (3/10/2016)      Encephalopathy (3/10/2016)      Acute respiratory failure with hypoxia and hypercapnia ([address] (3/13/2016)      Aspiration pneumonia ([facility] [address] (7/21/2021)      Alcohol abuse (7/21/2021)      Hepatitis-C (7/21/2021)        Plan:   cardiomyopathy without any overt heart failure. Clinically patient is responding to medical treatment, off pressors. Good urine output. No significant arrhythmia. Patient will need possible ischemic testing before discharge. Continue with current medical treatment. Patient is not a candidate for beta-blocker due to cocaine abuse. If blood pressure remains good he may be a candidate for low-dose losartan. Subjective:    cc:  Cardiomyopathy        REVIEW OF SYSTEMS:      limited due to intubation    Objective:      Visit Vitals  /77   Pulse 92   Temp 99 °F (37.2 °C)   Resp 16   Ht 5' 8\" (1.727 m)   Wt 82 kg (180 lb 12.4 oz)   SpO2 98%   BMI 27.49 kg/m²     Body mass index is 27.49 kg/m². Physical Exam:  General Appearance:  intubated  NECK: No JVD, no thyroidomeglay. LUNGS: Clear bilaterally. HEART: S1+S2 audible,    ABD: Non-tender, BS Audible    EXT: No edema, and no cysnosis. VASCULAR EXAM: Pulses are intact.     PSYCHIATRIC EXAM:  Limited due to intubation    Medication:  Current Facility-Administered Medications   Medication Dose Route Frequency    enoxaparin (LOVENOX) injection 40 mg  40 mg SubCUTAneous Q24H    sodium chloride (NS) flush 5-10 mL  5-10 mL IntraVENous PRN    fentaNYL (PF) 900 mcg/30 ml infusion soln  0-200 mcg/hr IntraVENous TITRATE    chlorhexidine (PERIDEX) 0.12 % mouthwash 10 mL  10 mL Oral Q12H    pantoprazole (PROTONIX) 40 mg in 0.9% sodium chloride 10 mL injection  40 mg IntraVENous Q24H    ELECTROLYTE REPLACEMENT PROTOCOL - Potassium Renal Dosing  1 Each Other PRN    ELECTROLYTE REPLACEMENT PROTOCOL - Magnesium   1 Each Other PRN    ELECTROLYTE REPLACEMENT PROTOCOL  - Phosphorus Renal Dosing  1 Each Other PRN    ELECTROLYTE REPLACEMENT PROTOCOL - Calcium   1 Each Other PRN    0.9% sodium chloride infusion  75 mL/hr IntraVENous CONTINUOUS    insulin lispro (HUMALOG) injection   SubCUTAneous Q6H    glucose chewable tablet 16 g  4 Tablet Oral PRN    glucagon (GLUCAGEN) injection 1 mg  1 mg IntraMUSCular PRN    dextrose (D50W) injection syrg 12.5-25 g  25-50 mL IntraVENous PRN    piperacillin-tazobactam (ZOSYN) 4.5 g in 0.9% sodium chloride (MBP/ADV) 100 mL MBP  4.5 g IntraVENous Q8H    fentaNYL citrate (PF) injection 100 mcg  100 mcg IntraVENous Q4H PRN    LORazepam (ATIVAN) injection 4 mg  4 mg IntraVENous Q4H PRN    midazolam in normal saline (VERSED) 1 mg/mL infusion  0-10 mg/hr IntraVENous TITRATE    sodium chloride (NS) flush 5-40 mL  5-40 mL IntraVENous Q8H    sodium chloride (NS) flush 5-40 mL  5-40 mL IntraVENous PRN    acetaminophen (TYLENOL) tablet 650 mg  650 mg Oral Q6H PRN    Or    acetaminophen (TYLENOL) suppository 650 mg  650 mg Rectal Q6H PRN    polyethylene glycol (MIRALAX) packet 17 g  17 g Oral DAILY PRN    ondansetron (ZOFRAN ODT) tablet 4 mg  4 mg Oral Q8H PRN    Or    ondansetron (ZOFRAN) injection 4 mg  4 mg IntraVENous Q6H PRN    sodium chloride (NS) flush 5-40 mL  5-40 mL IntraVENous Q8H    sodium chloride (NS) flush 5-40 mL  5-40 mL IntraVENous PRN    LORazepam (ATIVAN) tablet 1 mg  1 mg Oral Q1H PRN    Or    LORazepam (ATIVAN) injection 1 mg  1 mg IntraVENous Q1H PRN    LORazepam (ATIVAN) tablet 2 mg  2 mg Oral Q1H PRN Or    LORazepam (ATIVAN) injection 2 mg  2 mg IntraVENous Q1H PRN    LORazepam (ATIVAN) injection 3 mg  3 mg IntraVENous Q15MIN PRN               Lab/Data Reviewed:  Procedures/imaging: see electronic medical records for all procedures/Xrays   and details which were not copied into this note but were reviewed prior to creation of Plan       All lab results for the last 24 hours reviewed. Recent Labs     07/22/21  0400 07/21/21  0704   WBC 17.3* 13.5*   HGB 13.9 16.9*   HCT 41.7 52.6*    223     Recent Labs     07/22/21  0400 07/21/21  0704    139   K 4.2 4.0    99*   CO2 22 24   * 135*   BUN 21* 20*   CREA 1.01 1.80*   CA 8.5 9.4       RADIOLOGY:  CT Results  (Last 48 hours)               07/21/21 0850  CT HEAD WO CONT Final result    Impression:      No acute intracranial abnormality. Right maxillary sinus mucosal thickening. Fluid secretions within the posterior   nasopharynx. Narrative:  EXAM: CT head       INDICATION: Altered mental status. COMPARISON: None. TECHNIQUE: Axial CT imaging of the head was performed without intravenous   contrast. Standard multiplanar coronal and sagittal reformatted images were   obtained and are included in interpretation. One or more dose reduction techniques were used on this CT: automated exposure   control, adjustment of the mAs and/or kVp according to patient size, and   iterative reconstruction techniques. The specific techniques used on this CT   exam have been documented in the patient's electronic medical record. Digital   Imaging and Communications in Medicine (DICOM) format image data are available   to nonaffiliated external healthcare facilities or entities on a secure, media   free, reciprocally searchable basis with patient authorization for at least a   12-month period after this study.     _______________       FINDINGS:       BRAIN AND POSTERIOR FOSSA:   Brain       EXTRA-AXIAL SPACES AND MENINGES: There are no abnormal extra-axial fluid   collections. CALVARIUM: Intact. SINUSES: Right maxillary sinus mucosal thickening. OTHER: Fluid secretions within the posterior nasopharynx. _______________               CXR Results  (Last 48 hours)               07/22/21 0640  XR CHEST PORT Final result    Impression:      Improving bilateral pulmonary infiltrates with faint residual bilateral   opacities. Narrative:  EXAM: XR CHEST PORT       CLINICAL INDICATION/HISTORY: intubated   -Additional: None       COMPARISON: 1 day prior        TECHNIQUE: Portable frontal view of the chest       _______________       FINDINGS:       SUPPORT DEVICES: Endotracheal tube in the midthoracic trachea. Bettey Grad HEART AND MEDIASTINUM: Cardiomediastinal silhouette within normal limits. LUNGS AND PLEURAL SPACES: Improving bilateral pulmonary infiltrates with faint   residual bilateral opacities. No large effusion or pneumothorax.       _______________           07/21/21 0732  XR CHEST PORT Final result    Impression:      Extensive bilateral parenchymal infiltrates. Narrative:  EXAM: XR CHEST PORT       CLINICAL INDICATION/HISTORY: meets SIRS criteria   -Additional: None       COMPARISON: 12/20/2017       TECHNIQUE: Portable frontal view of the chest       _______________       FINDINGS:       SUPPORT DEVICES: Endotracheal tube in the midthoracic trachea. Bettey Grad HEART AND MEDIASTINUM: Cardiomediastinal silhouette within normal limits. LUNGS AND PLEURAL SPACES: Extensive bilateral parenchymal infiltrates.  No large   effusion or pneumothorax.       _______________                   Cardiology Procedures:   Results for orders placed or performed during the hospital encounter of 07/21/21   EKG, 12 LEAD, INITIAL   Result Value Ref Range    Ventricular Rate 130 BPM    Atrial Rate 130 BPM    P-R Interval 134 ms    QRS Duration 80 ms    Q-T Interval 318 ms    QTC Calculation (Bezet) 467 ms    Calculated P Axis 77 degrees    Calculated R Axis 90 degrees    Calculated T Axis 60 degrees    Diagnosis       Sinus tachycardia with fusion complexes  Rightward axis  Borderline ECG  When compared with ECG of 28-DEC-2017 14:54,  fusion complexes are now present  Vent.  rate has increased BY  57 BPM  Non-specific change in ST segment in Inferior leads  Confirmed by Gregory Clarke MD. ([id number]) on 7/21/2021 10:20:03 PM        Echo Results  (Last 48 hours)    None       Cardiolite (Tc-99m Sestamibi) stress test    Signed By: Belem Titus MD     July 22, 2021

## 2021-07-22 NOTE — PROGRESS NOTES
Physician Progress Note      PATIENT:               Madison Parrish  CSN #:                  [id number]  :                       1985  ADMIT DATE:       2021 7:00 AM  [address] DATE:  RESPONDING  PROVIDER #:        Abran Scherer MD          QUERY TEXT:    Dear Hospitalist,    Pt admitted with drug overdose. Pt noted by Cardiology to be \"admitted in the hospital with unresponsiveness/hypoxic respiratory failure intubated and with sepsis. \" However sepsis is not documented on the active problem list or in the attending's PN. If possible, please document in the progress notes and discharge summary if you are evaluating and /or treating any of the following: The medical record reflects the following:    Risk Factors: Polysubstance abuse, aspiration pneumonia, Hep C    Clinical Indicators:  > ED provider noted \"Though the patient meets sepsis criteria, likely secondary from aspiration pneumonia after his overdose\"  > Cardiology noted  \"admitted in the hospital with unresponsiveness/hypoxic respiratory failure intubated and with sepsis. \"  > Per H&P \"Sepsis protocol was started in ER, lactic acid was 9.\"  > Lactic acid =9.60, 9.48, 2.1 , 2.3  > WBC =17.3, =13.5, 4.4  > , 139, 151, 152  > Temp 100.9, 100.8, 100.6    Treatment: Receiving Zosyn, Clindamycin, Levaquin, Vancomycin, electrolyte replacement protocol,  labs: lactic acid, CMP, UA, Blood culture    Thank you,  Suzie Andrade RN, BSN, [place], [address] [facility]  Options provided:  -- Sepsis, present on admission  -- Sepsis, present on admission now resolved  -- Aspiration pneumonia without Sepsis  -- Sepsis was ruled out  -- Other - I will add my own diagnosis  -- Disagree - Not applicable / Not valid  -- Disagree - Clinically unable to determine / Unknown  -- Refer to Clinical Documentation Reviewer    PROVIDER RESPONSE TEXT:    After further study, sepsis was ruled out for this patient.     Query created by: Philomena Pastrana on 7/22/2021 8:35 AM      Electronically signed by:  Madelyn Adkins MD 7/22/2021 5:59 PM

## 2021-07-22 NOTE — CONSULTS
Comprehensive Nutrition Assessment    Type and Reason for Visit: Initial    Nutrition Recommendations/Plan: EN: initiate tube feeds w/ recc below within the next 24-48 hours     07/22/21 0935   Enteral Nutrition   Feeding Route Orogastric   EN Formula 2.0 Calorie   Schedule Continuous   Feeding Regimen Goal rate of 40ml/hr. Start @ 10ml/hr, increase by 10ml/hr Q6   Water Flushes Will defer to MD   Goal EN & Flush Order Provides Provides 1760kcals, 73g PRO, 192g CHO, 616ml H2O     Nutrition Assessment:  Pt admitted for ARDS, aspiration PNA, EZEQUIEL, encephalopathy, h/o Polysubstance abuse. Pt was intubated in ER d/t hypoxia and remains intubated in ICU    Estimated Daily Nutrient Needs:  Energy (kcal): 1672; Weight Used for Energy Requirements: Current  Protein (g): 66-82; Weight Used for Protein Requirements: Current (0.8-1g)  Fluid (ml/day): 1672; Method Used for Fluid Requirements: 1 ml/kcal    Nutrition Related Findings:  Labs: phos 2.4. Med: protonix. +BM 7/22      Wounds:    None       Current Nutrition Therapies:  DIET NPO    Anthropometric Measures:  · Height:  5' 8\" (172.7 cm)  · Current Body Wt:  82 kg (180 lb 12.4 oz)   · Admission Body Wt:  180 lb    · Usual Body Wt:  82.6 kg (182 lb) (6/2021)     · Ideal Body Wt:  154 lbs:  117.4 %   · BMI Category: Overweight (BMI 25.0-29. 9)       Nutrition Diagnosis:   · Inadequate oral intake related to impaired respiratory function as evidenced by NPO or clear liquid status due to medical condition, intubation, nutrition support-enteral nutrition    Nutrition Interventions:   Food and/or Nutrient Delivery: Start tube feeding  Nutrition Education and Counseling: No recommendations at this time  Coordination of Nutrition Care: No recommendation at this time    Goals:  Start tube feeds when possible and meet nutritional need within the next 4 days       Nutrition Monitoring and Evaluation:   Behavioral-Environmental Outcomes: None identified  Food/Nutrient Intake Outcomes: Diet advancement/tolerance, Enteral nutrition intake/tolerance  Physical Signs/Symptoms Outcomes: Biochemical data, GI status, Nausea/vomiting, Skin, Weight    Discharge Planning:     Too soon to determine     Electronically signed by Gianni Levia RD on 7/22/2021 at 9:38 AM

## 2021-07-22 NOTE — PROGRESS NOTES
0730 Bedside and Verbal shift change report given to OSIEL Hwang (oncoming nurse) by Maryhelen Goodpasture, RN (offgoing nurse). Report included the following information SBAR, Kardex, Intake/Output, MAR, Recent Results and Cardiac Rhythm ST.     0800 pt assessed at this time, pt resting in bed eyes closed, intubated, narvaez in place, pharmacologically paralyzed and sedated, low grade temp noted, ice bags applied, no signs of distress on vent, ART line assessed, OG tube verified, will continue to monitor    1020 updated by  that information given over the phone is only allowed for Mother and father of patient    1200 pt reassessed documented per flow sheet, paralytic titrated down,pt continues to remain sedated, no temp noted, no signs of distress or pain, VSS will continue to monitor    1600 pt reassessed, paralytic stopped prior to assessment, pt able to follow commands, opens eyes to voice, pt remains calm and cooperative on ventilator, no signs of distress, no temp noted, VSS will continue to monitor    1800 pt remains calm and cooperative on the vent, shows no signs of distress, continues to follow commands and open eyes to voice, will continue to monitor    1900 Bedside and Verbal shift change report given to Jourdan Abdi RN (oncoming nurse) by OSIEL Hwang (offgoing nurse).  Report included the following information SBAR, Kardex, Intake/Output, MAR, Recent Results and Cardiac Rhythm ST.

## 2021-07-22 NOTE — PROGRESS NOTES
Cm reviewed chart, noted pt has made some clinical improvement per  progress note today, possible plan for weaning trails  For possible extubation tomorrow, at this time d/c planning on hard due to critical level of care but cm will cont to view case for possible improvements and will initiate to move forward with d/c plan when appropriate, cm will remain available to staff and family when needed and can be contacted via [phone].

## 2021-07-22 NOTE — PROGRESS NOTES
1930: Report received from Lakhwinder Bruno RN. Assumed care of pt at this time. Report included the following information SBAR, Kardex and MAR. 2030: Head to toe assessment completed. Patient is currently intubated and sedated. No command following. Pupils equal and reactive to light. Mouth care completed. Ice packs placed. 2203: Tylenol 650mg given PRN.     0100: Talked to Dr. Delano Corona about negative Rapid Covid test. Droplet Precautions discontinued. 0120: Called [employer]). 0400: Reassessment completed. Some movement to toes and head nods with painful stimulation. Patient bathed and suctioned. 0750: Shift change report given to CHIKI Vergara RN (oncoming nurse) by Jacqueline VALENTE RN (offgoing nurse). Report included the following information SBAR, Kardex and MAR.

## 2021-07-22 NOTE — PROGRESS NOTES
conducted a follow up consultation and spiritual assessment for Jenna Damon, who is a 30 y.o.,male. Patient's family and friends have repeatedly tried to call and visit. I spoke to patient's mother and father who had just arrived at the [facility] [place]. They asked that no one but the two of them be allowed to visit and no one but them get medical information. They arrived later and visited at the bedside. They are aware of his medical, legal and drug abuse issues. Chaplains will continue to follow and will provide pastoral care as needed or requested.  recommends bedside caregivers page the  on duty if patient shows signs of acute spiritual or emotional distress. [address], [place].    Board Certified   [phone] - Office

## 2021-07-22 NOTE — WOUND CARE
Wound Care Note:    Chart audited for low keila score, patient with high risk for skin breakdown, no documented wounds at time of audit.      Skin Care & Pressure Relief Recommendations  Minimize layers of linen  Pads under patient to optimize support surface  Turn/reposition approximately every 2 hours  Pillow wedges  Manage incontinence   Promote continence; Skin Protective lotion/cream to buttocks and sacrum daily and as needed with incontinence care  Offload heels pillows    Consult wound care if any wounds/ skin care needs noted during admission

## 2021-07-22 NOTE — PROGRESS NOTES
Pulm/CC    Patient off paralytic; he is arousable, opens eyes, follows simple commands; moving hands and feet. Continue ventilator support; continue midazolam and fentanyl infusions. Avoid propofol due to cardiomyopathy. Likely weaning trials tomorrow morning for possible extubation. Sheyla Driscoll Mother   [phone]     I have called and discussed with patient's mother; updated patient's improvement in clinical condition; neurological status seems to be reassuring; improved kidney function; blood pressure has improved, and patient has come off pressors.     Danial De La Rosa MD

## 2021-07-22 NOTE — PROGRESS NOTES
Pulmonary Specialists  Pulmonary, Critical Care, and Sleep Medicine    Name: Jolene Pina MRN: [id number]   : 1985 Hospital: [facility] [place]    Date: 2021  Room: [address] [place] [address] Note                                              Consult requesting physician: Dr. [place] Adjutant  Reason for Consult: Acute respiratory failure; overdose      Subjective/History of Present Illness:     Patient is a 30 y.o. male with PMHx significant for diabetes mellitus type 2, substance abuse, anxiety was found down by sister of unknown duration. Patient was unresponsive. The sister tried to give Narcan with no response. The police came in gave some Narcan with some improvement in mentation. When EMS arrived, patient was poorly responsive, and very hypoxemic. He was brought to the emergency room where he remained unresponsive and hypoxemic. He needed to be intubated. Postintubation, he remained severely hypoxemic with sats in the 50s to 60s.    2021  Patient in ICU; intubated on ventilator support. Patient is on sedation and paralytic. No acute issues overnight. Low-grade fevers-T-max 100.4 overnight. Telemetry-mild sinus tachycardia; blood pressure has improved-patient has come off Levophed and phenylephrine. Urine output-750 mL overnight. Review of symptoms  Known patient with polysubstance abuse. Prior UDS has shown amphetamines, benzodiazepines, cocaine and opiates. Limited review of symptoms due to patient condition. No Known Allergies   Past Medical History:   Diagnosis Date    Drug abuse, IV ([address]     Heroin abuse ([facility] [address]     Psychiatric diagnosis     ADHD    Psychiatric diagnosis     drug abuse.  Psychiatric disorder     anxiety      No past surgical history on file. Social History     Tobacco Use    Smoking status: Current Every Day Smoker     Packs/day: 1.00    Smokeless tobacco: Never Used   Substance Use Topics    Alcohol use:  Yes Comment: daily      History reviewed. No pertinent family history. Prior to Admission medications    Medication Sig Start Date End Date Taking? Authorizing Provider   amphetamine-dextroamphetamine XR (Adderall XR) 20 mg XR capsule Take 20 mg by mouth three (3) times daily.     Other, MD Celia     Current Facility-Administered Medications   Medication Dose Route Frequency    propofol (DIPRIVAN) 10 mg/mL infusion  0-50 mcg/kg/min IntraVENous TITRATE    fentaNYL (PF) 900 mcg/30 ml infusion soln  0-200 mcg/hr IntraVENous TITRATE    cisatracurium (NIMBEX) 100 mg in 0.9% sodium chloride 100 mL infusion  0-10 mcg/kg/min IntraVENous TITRATE    chlorhexidine (PERIDEX) 0.12 % mouthwash 10 mL  10 mL Oral Q12H    pantoprazole (PROTONIX) 40 mg in 0.9% sodium chloride 10 mL injection  40 mg IntraVENous Q24H    0.9% sodium chloride infusion  75 mL/hr IntraVENous CONTINUOUS    insulin lispro (HUMALOG) injection   SubCUTAneous Q6H    piperacillin-tazobactam (ZOSYN) 4.5 g in 0.9% sodium chloride (MBP/ADV) 100 mL MBP  4.5 g IntraVENous Q8H    midazolam in normal saline (VERSED) 1 mg/mL infusion  0-10 mg/hr IntraVENous TITRATE    heparin (porcine) injection 5,000 Units  5,000 Units SubCUTAneous Q8H    sodium chloride (NS) flush 5-40 mL  5-40 mL IntraVENous Q8H    levoFLOXacin (LEVAQUIN) 750 mg in D5W IVPB  750 mg IntraVENous Q24H    sodium chloride (NS) flush 5-40 mL  5-40 mL IntraVENous Q8H    vancomycin (VANCOCIN) 1250 mg in  ml infusion  1,250 mg IntraVENous Q18H    Vancomycin Pharmacy to Dose  1 Each Other Rx Dosing/Monitoring    NOREPINephrine (LEVOPHED) 8 mg in 0.9% NS 250ml infusion  0.5-16 mcg/min IntraVENous TITRATE    PHENYLephrine (MARYANN-SYNEPHRINE) 30 mg in 0.9% sodium chloride 250 mL infusion   mcg/min IntraVENous TITRATE         Objective:   Vital Signs:    Visit Vitals  /74   Pulse (!) 112   Temp 99.5 °F (37.5 °C)   Resp 20   Ht 5' 8\" (1.727 m)   Wt 81.6 kg (180 lb)   SpO2 100%   BMI 27.37 kg/m²       O2 Device: Ventilator   O2 Flow Rate (L/min): 15 l/min   Temp (24hrs), Av.9 °F (37.7 °C), Min:99 °F (37.2 °C), Max:100.9 °F (38.3 °C)       Intake/Output:   Last shift:      No intake/output data recorded. Last 3 shifts: 1901 - 700  In: 2221.9 [I.V.:2221.9]  Out: 2550 [Urine:]      Intake/Output Summary (Last 24 hours) at 2021 0825  Last data filed at 2021 3892  Gross per 24 hour   Intake 2221.91 ml   Output 2550 ml   Net -328.09 ml       Last 3 Recorded Weights in this Encounter    21 0700 21 0841   Weight: 81.6 kg (180 lb) 81.6 kg (180 lb)       ABG:    Lab Results   Component Value Date/Time    PHI 7.39 2021 04:42 AM    PCO2I 32.7 (L) 2021 04:42 AM    PO2I 340 (H) 2021 04:42 AM    HCO3I 20.0 (L) 2021 04:42 AM    FIO2I 80 2021 04:42 AM     Ventilator Mode: Pressure control  Respiratory Rate  Back-Up Rate: 20  Insp Time (sec): 0.9 sec  I:E Ratio: 1:1.8  Ventilator Volumes  Vt Set (ml):  (Target 500)  Vt Exhaled (Machine Breath) (ml): 699 ml  Ve Observed (l/min): 13.4 l/min  Ventilator Pressures  PC Set: 18  PIP Observed (cm H2O): 27 cm H2O  Plateau Pressure (cm H2O): 21 cm H2O  MAP (cm H2O): 14  PEEP/VENT (cm H2O): 8 cm H20  Auto PEEP Observed (cm H2O): 1.8 cm H2O       Physical Exam:   Patient on ventilator support-sedated and paralyzed currently; acyanotic; no obvious facial or head injuries  HEENT: pupils not dilated, reactive, no scleral jaundice, moist oral mucosa, no nasal drainage  Neck: No adenopathy or thyroid swelling  Orotracheal and orogastric tube-no bleeding or secretions.   Chest Wall-no obvious injuries or bruises  CVS: Telemetry-mild sinus tachycardia; S1 and S2 with no murmur; JVD not elevated   RS: Moderate to good air entry bilateral; no wheezes; no obvious crackles; ventilator synchrony noted with sedation and paralytic  Abd: Soft, no tenderness, no distention, no guarding or rigidity; bowel sounds present; no hepatosplenomegaly  No bruising in abdominal wall  Neuro: Intubated, sedated and on paralytic; limited exam; pupils not dilated  Extrm: no leg edema or swelling or clubbing  Skin: no rash  Lymphatic: no cervical or supraclavicular adenopathy  Vasc: Right foot DP and PT pulses good; good capillary refill in the distal toes. Similar findings left foot. MS: No joint swelling  Right groin: Right femoral central and arterial lines-no bleeding or hematoma or erythema noted.       Data:       Recent Results (from the past 24 hour(s))   BLOOD GAS, ARTERIAL POC    Collection Time: 07/21/21  9:32 AM   Result Value Ref Range    Device: ADULT VENT      FIO2 (POC) 100 %    pH (POC) 7.24 (LL) 7.35 - 7.45      pCO2 (POC) 60.6 (H) 35.0 - 45.0 MMHG    pO2 (POC) 63 (L) 80 - 100 MMHG    HCO3 (POC) 25.6 22 - 26 MMOL/L    sO2 (POC) 86.7 (L) 92 - 97 %    Base deficit (POC) 3.3 mmol/L    Mode PRESSURE CONTROL      Set Rate 24 bpm    PEEP/CPAP (POC) 10 cmH2O    Site RIGHT RADIAL      Specimen type (POC) ARTERIAL      Performed by Tej Jackson)    ECHO ADULT COMPLETE    Collection Time: 07/21/21 10:25 AM   Result Value Ref Range    IVSd 0.73 0.60 - 1.00 cm    LVIDd 4.44 4.20 - 5.90 cm    LVIDs 3.83 cm    LVOT d 2.12 cm    LVPWd 0.92 0.60 - 1.00 cm    BP EF 17.4 (A) 55.0 - 100.0 percent    LV Ejection Fraction MOD 2C 17 percent    LV Ejection Fraction MOD 4C 10 percent    LV ED Vol A2C 58.68 mL    LV ED Vol A4C 85.68 mL    LV ED Vol BP 74.17 67.0 - 155.0 mL    LV ES Vol A2C 48.80 mL    LV ES Vol A4C 76.86 mL    LV ES Vol BP 61.28 (A) 22.0 - 58.0 mL    LVOT SV 9.9 mL    LVOT Peak Gradient 1.24 mmHg    Left Ventricular Outflow Tract Mean Gradient 0.82 mmHg    LVOT SV 29.2 mL    LVOT Peak Velocity 55.75 cm/s    LVOT VTI 8.28 cm    RVIDd 5.19 cm    LA Volume 30.10 18.0 - 58.0 mL    LA Vol 2C 28.23 18.00 - 58.00 mL    LA Vol 4C 23.33 18.00 - 58.00 mL    Right Atrial Area 4C 13.04 cm2    Aortic Valve Area by Continuity of Peak Velocity 2.95 cm2    Aortic Valve Area by Continuity of VTI 2.97 cm2    AoV PG 1.78 mmHg    Aortic Valve Systolic Mean Gradient 2.48 mmHg    Aortic Valve Systolic Peak Velocity 62.22 cm/s    AoV VTI 9.84 cm    Ao Root D 3.44 cm    IVC proximal 1.84 cm    LV E' Septal Velocity 4.00 cm/s    LV E' Lateral Velocity 5.00 cm/s    LV Mass .7 88.0 - 224.0 g    LV Mass AL Index 59.3 49.0 - 115.0 g/m2    LVES Vol Index BP 31.4 mL/m2    LVED Vol Index BP 38.0 mL/m2    LA Vol Index 15.44 16.00 - 28.00 ml/m2    LA Vol Index 14.48 16.00 - 28.00 ml/m2    LA Vol Index 11.96 16.00 - 28.00 ml/m2    LVED Vol Index A4C 43.9 mL/m2    LVED Vol Index A2C 30.1 mL/m2    LVES Vol Index A4C 39.4 mL/m2    LVES Vol Index A2C 25.0 mL/m2    ARY/BSA Pk Grover 1.5 cm2/m2    ARY/BSA VTI 1.5 cm2/m2   LACTIC ACID    Collection Time: 07/21/21 11:15 AM   Result Value Ref Range    Lactic acid 2.1 (HH) 0.4 - 2.0 MMOL/L   CARDIAC PANEL,(CK, CKMB & TROPONIN)    Collection Time: 07/21/21 11:15 AM   Result Value Ref Range    CK - MB 4.1 (H) <3.6 ng/ml    CK-MB Index 0.8 0.0 - 4.0 %     (H) 39 - 308 U/L    Troponin-I, QT 0.05 (H) 0.0 - 0.045 NG/ML   BLOOD GAS, ARTERIAL POC    Collection Time: 07/21/21  2:42 PM   Result Value Ref Range    Device: ADULT VENT      FIO2 (POC) 100 %    pH (POC) 7.23 (LL) 7.35 - 7.45      pCO2 (POC) 60.1 (H) 35.0 - 45.0 MMHG    pO2 (POC) 142 (H) 80 - 100 MMHG    HCO3 (POC) 25.1 22 - 26 MMOL/L    sO2 (POC) 98.6 (H) 92 - 97 %    Base deficit (POC) 3.8 mmol/L    Mode PRESSURE CONTROL      Set Rate 24 bpm    PEEP/CPAP (POC) 10 cmH2O    Allens test (POC) Positive      Site RIGHT RADIAL      Specimen type (POC) ARTERIAL      Performed by Lewis Rosario)    LACTIC ACID    Collection Time: 07/21/21  3:00 PM   Result Value Ref Range    Lactic acid 1.7 0.4 - 2.0 MMOL/L   GLUCOSE, POC    Collection Time: 07/21/21  6:44 PM   Result Value Ref Range    Glucose (POC) 97 70 - 110 mg/dL   CARDIAC PANEL,(CK, CKMB & TROPONIN) Collection Time: 07/21/21  7:50 PM   Result Value Ref Range    CK - MB 3.4 <3.6 ng/ml    CK-MB Index 0.7 0.0 - 4.0 %     (H) 39 - 308 U/L    Troponin-I, QT 0.03 0.0 - 0.045 NG/ML   LACTIC ACID    Collection Time: 07/21/21  7:50 PM   Result Value Ref Range    Lactic acid 2.3 (HH) 0.4 - 2.0 MMOL/L   COVID-19 RAPID TEST    Collection Time: 07/21/21 11:10 PM   Result Value Ref Range    Specimen source Nasopharyngeal      COVID-19 rapid test Not detected NOTD     GLUCOSE, POC    Collection Time: 07/21/21 11:47 PM   Result Value Ref Range    Glucose (POC) 93 70 - 110 mg/dL   CBC WITH AUTOMATED DIFF    Collection Time: 07/22/21  4:00 AM   Result Value Ref Range    WBC 17.3 (H) 4.6 - 13.2 K/uL    RBC 4.64 4.35 - 5.65 M/uL    HGB 13.9 13.0 - 16.0 g/dL    HCT 41.7 36.0 - 48.0 %    MCV 89.9 74.0 - 97.0 FL    MCH 30.0 24.0 - 34.0 PG    MCHC 33.3 31.0 - 37.0 g/dL    RDW 11.8 11.6 - 14.5 %    PLATELET 734 477 - 618 K/uL    MPV 11.1 9.2 - 11.8 FL    NEUTROPHILS 55 40 - 73 %    BAND NEUTROPHILS 29 (H) 0 - 5 %    LYMPHOCYTES 0 (L) 21 - 52 %    MONOCYTES 5 3 - 10 %    EOSINOPHILS 0 0 - 5 %    BASOPHILS 0 0 - 2 %    METAMYELOCYTES 10 (H) 0 %    MYELOCYTES 1 (H) 0 %    ABS. NEUTROPHILS 14.5 (H) 1.8 - 8.0 K/UL    ABS. LYMPHOCYTES 0.0 (L) 0.9 - 3.6 K/UL    ABS. MONOCYTES 0.9 0.05 - 1.2 K/UL    ABS. EOSINOPHILS 0.0 0.0 - 0.4 K/UL    ABS.  BASOPHILS 0.0 0.0 - 0.1 K/UL    DF MANUAL      PLATELET COMMENTS ADEQUATE PLATELETS      RBC COMMENTS NORMOCYTIC, NORMOCHROMIC     METABOLIC PANEL, COMPREHENSIVE    Collection Time: 07/22/21  4:00 AM   Result Value Ref Range    Sodium 138 136 - 145 mmol/L    Potassium 4.2 3.5 - 5.5 mmol/L    Chloride 108 100 - 111 mmol/L    CO2 22 21 - 32 mmol/L    Anion gap 8 3.0 - 18 mmol/L    Glucose 103 (H) 74 - 99 mg/dL    BUN 21 (H) 7.0 - 18 MG/DL    Creatinine 1.01 0.6 - 1.3 MG/DL    BUN/Creatinine ratio 21 (H) 12 - 20      GFR est AA >60 >60 ml/min/1.73m2    GFR est non-AA >60 >60 ml/min/1.73m2    Calcium 8.5 8.5 - 10.1 MG/DL    Bilirubin, total 1.4 (H) 0.2 - 1.0 MG/DL    ALT (SGPT) 150 (H) 16 - 61 U/L    AST (SGOT) 118 (H) 10 - 38 U/L    Alk.  phosphatase 75 45 - 117 U/L    Protein, total 5.6 (L) 6.4 - 8.2 g/dL    Albumin 3.0 (L) 3.4 - 5.0 g/dL    Globulin 2.6 2.0 - 4.0 g/dL    A-G Ratio 1.2 0.8 - 1.7     MAGNESIUM    Collection Time: 07/22/21  4:00 AM   Result Value Ref Range    Magnesium 1.8 1.6 - 2.6 mg/dL   CALCIUM, IONIZED    Collection Time: 07/22/21  4:00 AM   Result Value Ref Range    Ionized Calcium 1.15 1.12 - 1.32 MMOL/L   LACTIC ACID    Collection Time: 07/22/21  4:00 AM   Result Value Ref Range    Lactic acid 1.8 0.4 - 2.0 MMOL/L   PHOSPHORUS    Collection Time: 07/22/21  4:00 AM   Result Value Ref Range    Phosphorus 2.4 (L) 2.5 - 4.9 MG/DL   BLOOD GAS, ARTERIAL POC    Collection Time: 07/22/21  4:42 AM   Result Value Ref Range    Device: ADULT VENT      FIO2 (POC) 80 %    pH (POC) 7.39 7.35 - 7.45      pCO2 (POC) 32.7 (L) 35.0 - 45.0 MMHG    pO2 (POC) 340 (H) 80 - 100 MMHG    HCO3 (POC) 20.0 (L) 22 - 26 MMOL/L    sO2 (POC) 99.9 (H) 92 - 97 %    Base deficit (POC) 4.1 mmol/L    Mode PRESSURE CONTROL      Tidal volume 608 ml    Set Rate 24 bpm    PEEP/CPAP (POC) 8 cmH2O    PIP (POC) 27      Allens test (POC) NOT APPLICABLE      Inspiratory Time 0.9 sec    Site DRAWN FROM ARTERIAL LINE      Specimen type (POC) ARTERIAL      Performed by Cabrera Shahid)    GLUCOSE, POC    Collection Time: 07/22/21  5:09 AM   Result Value Ref Range    Glucose (POC) 100 70 - 110 mg/dL         Chemistry Recent Labs     07/22/21  0400 07/21/21  0704   * 135*    139   K 4.2 4.0    99*   CO2 22 24   BUN 21* 20*   CREA 1.01 1.80*   CA 8.5 9.4   MG 1.8 3.1*   PHOS 2.4*  --    AGAP 8 16   BUCR 21* 11*   AP 75 117   TP 5.6* 7.8   ALB 3.0* 4.4   GLOB 2.6 3.4   AGRAT 1.2 1.3        Lactic Acid Lactic acid   Date Value Ref Range Status   07/22/2021 1.8 0.4 - 2.0 MMOL/L Final     Recent Labs     07/22/21  0400 07/21/21  1950 07/21/21  1500   LAC 1.8 2.3* 1.7        Liver Enzymes Protein, total   Date Value Ref Range Status   07/22/2021 5.6 (L) 6.4 - 8.2 g/dL Final     Albumin   Date Value Ref Range Status   07/22/2021 3.0 (L) 3.4 - 5.0 g/dL Final     Globulin   Date Value Ref Range Status   07/22/2021 2.6 2.0 - 4.0 g/dL Final     A-G Ratio   Date Value Ref Range Status   07/22/2021 1.2 0.8 - 1.7   Final     Alk. phosphatase   Date Value Ref Range Status   07/22/2021 75 45 - 117 U/L Final     Recent Labs     07/22/21  0400 07/21/21  0704   TP 5.6* 7.8   ALB 3.0* 4.4   GLOB 2.6 3.4   AGRAT 1.2 1.3   AP 75 117        CBC w/Diff Recent Labs     07/22/21  0400 07/21/21  0704   WBC 17.3* 13.5*   RBC 4.64 5.61   HGB 13.9 16.9*   HCT 41.7 52.6*    223   GRANS 55 63   LYMPH 0* 19*   EOS 0 2        Cardiac Enzymes Lab Results   Component Value Date/Time     (H) 07/21/2021 07:50 PM     (H) 07/21/2021 11:15 AM    CKMB 3.4 07/21/2021 07:50 PM    CKMB 4.1 (H) 07/21/2021 11:15 AM    CKND1 0.7 07/21/2021 07:50 PM    CKND1 0.8 07/21/2021 11:15 AM    TROIQ 0.03 07/21/2021 07:50 PM    TROIQ 0.05 (H) 07/21/2021 11:15 AM        BNP No results found for: BNP, BNPP, XBNPT     Coagulation Recent Labs     07/21/21  0704   PTP 14.5   INR 1.2   APTT 29.6         Thyroid  Lab Results   Component Value Date/Time    TSH 1.49 03/11/2016 02:55 AM       No results found for: T4     Urinalysis Lab Results   Component Value Date/Time    Color YELLOW 07/21/2021 08:00 AM    Appearance CLEAR 07/21/2021 08:00 AM    Specific gravity 1.009 07/21/2021 08:00 AM    pH (UA) 5.5 07/21/2021 08:00 AM    Protein Negative 07/21/2021 08:00 AM    Glucose Negative 07/21/2021 08:00 AM    Ketone Negative 07/21/2021 08:00 AM    Bilirubin Negative 07/21/2021 08:00 AM    Urobilinogen 0.2 07/21/2021 08:00 AM    Nitrites Negative 07/21/2021 08:00 AM    Leukocyte Esterase Negative 07/21/2021 08:00 AM          June 2013  Hep C virus Ab Interp.  NEGATIVE POSITIVEAbnormal         Culture data during this hospitalization. All Micro Results     Procedure Component Value Units Date/Time    CULTURE, BLOOD [[id number]] Collected: 07/21/21 0704    Order Status: Completed Specimen: Blood Updated: 07/22/21 0806     Special Requests: NO SPECIAL REQUESTS        Culture result: NO GROWTH 1 DAY       CULTURE, BLOOD [[id number]] Collected: 07/21/21 0730    Order Status: Completed Specimen: Blood Updated: 07/22/21 0806     Special Requests: NO SPECIAL REQUESTS        Culture result: NO GROWTH 1 DAY       COVID-19 RAPID TEST [[id number]] Collected: 07/21/21 2310    Order Status: Completed Specimen: Nasopharyngeal Updated: 07/21/21 2336     Specimen source Nasopharyngeal        COVID-19 rapid test Not detected        Comment: Rapid Abbott ID Now       Rapid NAAT:  The specimen is NEGATIVE for SARS-CoV-2, the novel coronavirus associated with COVID-19. Negative results should be treated as presumptive and, if inconsistent with clinical signs and symptoms or necessary for patient management, should be tested with an alternative molecular assay. Negative results do not preclude SARS-CoV-2 infection and should not be used as the sole basis for patient management decisions. This test has been authorized by the FDA under an Emergency Use Authorization (EUA) for use by authorized laboratories.    Fact sheet for Healthcare Providers: ConventionUpdate.co.nz  Fact sheet for Patients: ConventionUpdate.co.nz       Methodology: Isothermal Nucleic Acid Amplification         RESPIRATORY VIRUS PANEL W/COVID-19, PCR [[id number]] Collected: 07/21/21 2230    Order Status: Completed Specimen: NASOPHARYNGEAL SWAB Updated: 07/21/21 2233    CULTURE, RESPIRATORY/SPUTUM/BRONCH Maricarmen Jones STAIN [[id number]]     Order Status: Sent Specimen: Sputum from Tracheal Aspirate            ECHO 7/21/2021 Interpretation Summary  Result status: Final result ·   Echo study was technically difficulty and limited due to patient's condition. Image quality for this study was suboptimal. Contrast used: DEFINITY. · LV: Estimated LVEF is 15 - 20%. Normal cavity size and wall thickness. Severely reduced systolic function. Unable to assess diastolic function due to elevated heart rate. · RV: Moderately dilated right ventricle. Reduced systolic function. Assessment of RV function: TAPSE= 9mm. · TV: Normal valve structure and no stenosis. Tricuspid regurgitation is inadequate for estimation of right ventricular systolic pressure. · IVC: Mechanically ventilated; cannot use inferior caval vein diameter to estimate central venous pressure. Images report reviewed by me:  CT 7/21 (Most Recent)  Results from Hospital Encounter encounter on 07/21/21    CT HEAD WO CONT    Narrative  EXAM: CT head    INDICATION: Altered mental status. COMPARISON: None. TECHNIQUE: Axial CT imaging of the head was performed without intravenous  contrast. Standard multiplanar coronal and sagittal reformatted images were  obtained and are included in interpretation. One or more dose reduction techniques were used on this CT: automated exposure  control, adjustment of the mAs and/or kVp according to patient size, and  iterative reconstruction techniques. The specific techniques used on this CT  exam have been documented in the patient's electronic medical record. Digital  Imaging and Communications in Medicine (DICOM) format image data are available  to nonaffiliated external healthcare facilities or entities on a secure, media  free, reciprocally searchable basis with patient authorization for at least a  12-month period after this study. _______________    FINDINGS:    BRAIN AND POSTERIOR FOSSA:  Brain    EXTRA-AXIAL SPACES AND MENINGES: There are no abnormal extra-axial fluid  collections. CALVARIUM: Intact. SINUSES: Right maxillary sinus mucosal thickening.     OTHER: Fluid secretions within the posterior nasopharynx. _______________    Impression  No acute intracranial abnormality. Right maxillary sinus mucosal thickening. Fluid secretions within the posterior  nasopharynx. CXR reviewed by me:  XR 7/21  Results from Hospital Encounter encounter on 07/21/21    XR ABD ([facility])    Narrative  EXAM: SUPINE ABDOMINAL RADIOGRAPH    CLINICAL INDICATION/HISTORY: OG verification  -Additional: None    COMPARISON: None    TECHNIQUE: Frontal views of the abdomen.    _______________    FINDINGS:    BOWEL GAS PATTERN: No evidence of obstruction. No visible free air, given  limitations of supine view. Enteric tube within the stomach. Large amount stool  throughout the colon. BONES: Unremarkable. OTHER: None.    _______________    Impression  Enteric tube within the stomach. Chest x-ray 7/21/2021  COMPARISON: 12/20/2017  TECHNIQUE: Portable frontal view of the chest   FINDINGS:   SUPPORT DEVICES: Endotracheal tube in the midthoracic trachea.   HEART AND MEDIASTINUM: Cardiomediastinal silhouette within normal limits.   LUNGS AND PLEURAL SPACES: Extensive bilateral parenchymal infiltrates. No large  effusion or pneumothorax.   IMPRESSION   Extensive bilateral parenchymal infiltrates.         IMPRESSION:   · Acute respiratory failure with hypoxemia and hypercapnia  · ARDS  · Drug overdose  · Aspiration pneumonia  · Polysubstance abuse-cocaine, amphetamine  · Lactic acidosis  · Acute renal failure  · Alcohol abuse  · Hepatitis C infection  ·   ·   Patient Active Problem List   Diagnosis Code    Polysubstance abuse ([facility] [address] F19.10    Drug overdose, multiple drugs T50.911A    Elevated LFTs R79.89    Overdose of heroin ([facility] [address] T40.1X1A    EZEQUIEL (acute kidney injury) ([address] N17.9    ATN (acute tubular necrosis) ([facility]) N17.0    Elevated troponin R77.8    Lactic acidosis E87.2    Encephalopathy G93.40    Acute respiratory failure with hypoxia and hypercapnia (HCC) J96.01, J96.02    Hypokalemia E87.6    ARDS (adult respiratory distress syndrome) (HCC) J80    Aspiration pneumonia (HCC) J69.0    Alcohol abuse F10.10    Hepatitis-C B19.20       · Code status: Full code      RECOMMENDATIONS:   Respiratory: Patient intubated due to drug overdose and severe respiratory failure; severe hypoxemia post intubation in the ER. Acute lung injury on presentation-differential diagnosis of aspiration pneumonitis versus cocaine induced lung injury. Patient sedated and paralyzed; ABG shows significant improvement in oxygenation-PaO2/FiO2 ratio greater than 400; chest x-ray shows significant improvement in bilateral patchy opacities-residual small bilateral midlung infiltrate seen; no pleural effusions or pneumothorax; ET tube and OG tube in good positions. PCV mode of ventilation-decreased rate 16, decreased Pi 15, PEEP decreased to 5; FiO2 decreased to 40%. Ventilator and sedation bundles ordered. Sedation-midazolam and fentanyl drips. Paralytic-cisatracurium to be stopped this morning; train-of-four being followed. Keep SPO2 >=90%. HOB 30 degree elevation all the time. Aggressive pulmonary toileting. Aspiration precautions. CVS: Blood pressure improved; patient has come off Levophed and phenylephrine. Echocardiogram shows severe cardiomyopathy with EF 49-76%-[id number] versus alcoholic versus cocaine induced; appreciate cardiology evaluation. No significant elevation in troponins; proBNP very low. ID: Chest x-ray shows significant improvement; stop levofloxacin and IV vancomycin; continue Zosyn for now; complete 5 to 7 days antibiotic therapy based on clinical and radiological improvement. Blood cultures negative so far; lactic acid normalized. Mild leukocytosis-likely stress related-continue to monitor. Rapid Covid test negative. Hematology/Oncology: Stable hemoglobin and platelets; continue to monitor. Monitor hemoglobin and platelets; coags normal on admission. No active bleeding issues.   Renal: Good urine output; renal function improved-creatinine normal 1.01. Stable electrolytes; mag and Phos has been replaced this morning. GI: Plan tube feeding today; PPI prophylaxis. History of hepatitis C and cocaine with alcohol abuse; transaminases elevated-continue to follow. Endocrine: Stable blood sugars; sliding scale insulin if needed. Neurology: Patient unresponsive on arrival to ER; CT head-nil acute. Stop paralytic this morning, and see neurological status. Toxicology: Urine drug screen positive for  cocaine and amphetamines; high suspicion for synthetic opiate; patient also alcoholic. Serum acetaminophen and salicylate negative. Skin/Wound: ICU nursing care. Electrolytes: Replace electrolytes per ICU electrolyte replacement protocol. IVF: Continue normal saline 75 mL/h  Nutrition: Nutrition consult for tube feeding. Prophylaxis: DVT Prophylaxis: Enoxaparin. GI Prophylaxis: Protonix. Restraints: none while on paralytic  Lines/Tubes: PIVs  ETT: 7/21   OGT/NGT: 7/21   Orellana: 7/21 (Medically necessary for strict input/output monitoring in critically ill patient, will remove it when not needed. Orellana bundle followed). Right femoral central and arterial lines 7/21 (medically necessary; no bleeding or hematoma or evidence of infection; discontinue catheters when patient medically stable and no longer needed]. Will defer respective systems problem management to primary and other respective consultant and follow patient in ICU with primary and other medical team.  Further recommendations will be based on the patient's response to recommended treatment and results of the investigation ordered. Quality Care: PPI, DVT prophylaxis, HOB elevated, Infection control all reviewed and addressed. Care of plan d/w ICU nurses, RT. Update family later today. I had discussed with sister and both parents yesterday in great detail.     High complexity decision making was performed during the evaluation of this patient at high risk for decompensation with multiple organ involvement. Total critical care time spent rendering care exclusive of procedures/family discussion/coordination of care: 46 minutes. Please note: Voice-recognition software may have been used to generate this report, which may have resulted in some phonetic-based errors in grammar and contents. Even though attempts were made to correct all the mistakes, some may have been missed, and remained in the body of the document.       Yumiko Shoemaker MD  7/22/2021

## 2021-07-23 ENCOUNTER — APPOINTMENT (OUTPATIENT)
Dept: GENERAL RADIOLOGY | Age: 36
DRG: 917 | End: 2021-07-23
Attending: INTERNAL MEDICINE
Payer: COMMERCIAL

## 2021-07-23 LAB
ALBUMIN SERPL-MCNC: 2.6 G/DL (ref 3.4–5)
ALBUMIN/GLOB SERPL: 0.9 {RATIO} (ref 0.8–1.7)
ALP SERPL-CCNC: 76 U/L (ref 45–117)
ALT SERPL-CCNC: 127 U/L (ref 16–61)
ANION GAP SERPL CALC-SCNC: 7 MMOL/L (ref 3–18)
ARTERIAL PATENCY WRIST A: ABNORMAL
AST SERPL-CCNC: 166 U/L (ref 10–38)
BASE DEFICIT BLD-SCNC: 0.7 MMOL/L
BASOPHILS # BLD: 0 K/UL (ref 0–0.1)
BASOPHILS NFR BLD: 0 % (ref 0–2)
BDY SITE: ABNORMAL
BILIRUB SERPL-MCNC: 0.8 MG/DL (ref 0.2–1)
BUN SERPL-MCNC: 13 MG/DL (ref 7–18)
BUN/CREAT SERPL: 20 (ref 12–20)
CA-I SERPL-SCNC: 1.15 MMOL/L (ref 1.12–1.32)
CALCIUM SERPL-MCNC: 8.1 MG/DL (ref 8.5–10.1)
CHLORIDE SERPL-SCNC: 105 MMOL/L (ref 100–111)
CO2 SERPL-SCNC: 26 MMOL/L (ref 21–32)
CREAT SERPL-MCNC: 0.65 MG/DL (ref 0.6–1.3)
DIFFERENTIAL METHOD BLD: ABNORMAL
EOSINOPHIL # BLD: 0.1 K/UL (ref 0–0.4)
EOSINOPHIL NFR BLD: 1 % (ref 0–5)
ERYTHROCYTE [DISTWIDTH] IN BLOOD BY AUTOMATED COUNT: 11.7 % (ref 11.6–14.5)
GAS FLOW.O2 O2 DELIVERY SYS: ABNORMAL L/MIN
GAS FLOW.O2 SETTING OXYMISER: 16 BPM
GLOBULIN SER CALC-MCNC: 2.8 G/DL (ref 2–4)
GLUCOSE BLD STRIP.AUTO-MCNC: 85 MG/DL (ref 70–110)
GLUCOSE BLD STRIP.AUTO-MCNC: 90 MG/DL (ref 70–110)
GLUCOSE BLD STRIP.AUTO-MCNC: 93 MG/DL (ref 70–110)
GLUCOSE BLD STRIP.AUTO-MCNC: 99 MG/DL (ref 70–110)
GLUCOSE SERPL-MCNC: 89 MG/DL (ref 74–99)
HCO3 BLD-SCNC: 23.5 MMOL/L (ref 22–26)
HCT VFR BLD AUTO: 37.6 % (ref 36–48)
HGB BLD-MCNC: 12.5 G/DL (ref 13–16)
INSPIRATION.DURATION SETTING TIME VENT: 0.9 SEC
LYMPHOCYTES # BLD: 0.9 K/UL (ref 0.9–3.6)
LYMPHOCYTES NFR BLD: 7 % (ref 21–52)
MAGNESIUM SERPL-MCNC: 1.9 MG/DL (ref 1.6–2.6)
MCH RBC QN AUTO: 30 PG (ref 24–34)
MCHC RBC AUTO-ENTMCNC: 33.2 G/DL (ref 31–37)
MCV RBC AUTO: 90.4 FL (ref 74–97)
MONOCYTES # BLD: 0.1 K/UL (ref 0.05–1.2)
MONOCYTES NFR BLD: 1 % (ref 3–10)
NEUTS BAND NFR BLD MANUAL: 8 % (ref 0–5)
NEUTS SEG # BLD: 11.1 K/UL (ref 1.8–8)
NEUTS SEG NFR BLD: 83 % (ref 40–73)
O2/TOTAL GAS SETTING VFR VENT: 30 %
PCO2 BLD: 35.9 MMHG (ref 35–45)
PEEP RESPIRATORY: 5 CMH2O
PH BLD: 7.42 [PH] (ref 7.35–7.45)
PHOSPHATE SERPL-MCNC: 1.1 MG/DL (ref 2.5–4.9)
PHOSPHATE SERPL-MCNC: 1.9 MG/DL (ref 2.5–4.9)
PIP ISTAT,IPIP: 21
PLATELET # BLD AUTO: 120 K/UL (ref 135–420)
PMV BLD AUTO: 11.2 FL (ref 9.2–11.8)
PO2 BLD: 78 MMHG (ref 80–100)
POTASSIUM SERPL-SCNC: 3.5 MMOL/L (ref 3.5–5.5)
POTASSIUM SERPL-SCNC: 4.2 MMOL/L (ref 3.5–5.5)
PROT SERPL-MCNC: 5.4 G/DL (ref 6.4–8.2)
RBC # BLD AUTO: 4.16 M/UL (ref 4.35–5.65)
RBC MORPH BLD: ABNORMAL
SAO2 % BLD: 95.8 % (ref 92–97)
SERVICE CMNT-IMP: ABNORMAL
SODIUM SERPL-SCNC: 138 MMOL/L (ref 136–145)
SPECIMEN TYPE: ABNORMAL
VENTILATION MODE VENT: ABNORMAL
VT SETTING VENT: 500 ML
WBC # BLD AUTO: 12.2 K/UL (ref 4.6–13.2)

## 2021-07-23 PROCEDURE — 84132 ASSAY OF SERUM POTASSIUM: CPT

## 2021-07-23 PROCEDURE — 82962 GLUCOSE BLOOD TEST: CPT

## 2021-07-23 PROCEDURE — 77030020454 HC TU ET CUF HI/LO COVD -B

## 2021-07-23 PROCEDURE — 65610000006 HC RM INTENSIVE CARE

## 2021-07-23 PROCEDURE — C1751 CATH, INF, PER/CENT/MIDLINE: HCPCS

## 2021-07-23 PROCEDURE — 77030005513 HC CATH URETH FOL11 MDII -B

## 2021-07-23 PROCEDURE — 74011250636 HC RX REV CODE- 250/636: Performed by: HOSPITALIST

## 2021-07-23 PROCEDURE — 77030040392 HC DRSG OPTIFOAM MDII -A

## 2021-07-23 PROCEDURE — 74011000250 HC RX REV CODE- 250: Performed by: HOSPITALIST

## 2021-07-23 PROCEDURE — 2709999900 HC NON-CHARGEABLE SUPPLY

## 2021-07-23 PROCEDURE — 74011000250 HC RX REV CODE- 250: Performed by: INTERNAL MEDICINE

## 2021-07-23 PROCEDURE — 74011250636 HC RX REV CODE- 250/636: Performed by: INTERNAL MEDICINE

## 2021-07-23 PROCEDURE — 85025 COMPLETE CBC W/AUTO DIFF WBC: CPT

## 2021-07-23 PROCEDURE — 77010033678 HC OXYGEN DAILY

## 2021-07-23 PROCEDURE — 71045 X-RAY EXAM CHEST 1 VIEW: CPT

## 2021-07-23 PROCEDURE — C9113 INJ PANTOPRAZOLE SODIUM, VIA: HCPCS | Performed by: INTERNAL MEDICINE

## 2021-07-23 PROCEDURE — 82803 BLOOD GASES ANY COMBINATION: CPT

## 2021-07-23 PROCEDURE — 84100 ASSAY OF PHOSPHORUS: CPT

## 2021-07-23 PROCEDURE — 74011250636 HC RX REV CODE- 250/636: Performed by: EMERGENCY MEDICINE

## 2021-07-23 PROCEDURE — 74011250637 HC RX REV CODE- 250/637: Performed by: INTERNAL MEDICINE

## 2021-07-23 PROCEDURE — 82330 ASSAY OF CALCIUM: CPT

## 2021-07-23 PROCEDURE — 83735 ASSAY OF MAGNESIUM: CPT

## 2021-07-23 PROCEDURE — 36415 COLL VENOUS BLD VENIPUNCTURE: CPT

## 2021-07-23 PROCEDURE — 77030018798 HC PMP KT ENTRL FED COVD -A

## 2021-07-23 PROCEDURE — 94003 VENT MGMT INPAT SUBQ DAY: CPT

## 2021-07-23 PROCEDURE — 74011000258 HC RX REV CODE- 258: Performed by: INTERNAL MEDICINE

## 2021-07-23 PROCEDURE — 74011000258 HC RX REV CODE- 258: Performed by: EMERGENCY MEDICINE

## 2021-07-23 RX ORDER — POTASSIUM CHLORIDE 7.45 MG/ML
10 INJECTION INTRAVENOUS
Status: COMPLETED | OUTPATIENT
Start: 2021-07-23 | End: 2021-07-23

## 2021-07-23 RX ORDER — FENTANYL CITRATE 50 UG/ML
50 INJECTION, SOLUTION INTRAMUSCULAR; INTRAVENOUS
Status: DISCONTINUED | OUTPATIENT
Start: 2021-07-23 | End: 2021-07-27 | Stop reason: HOSPADM

## 2021-07-23 RX ORDER — SODIUM,POTASSIUM PHOSPHATES 280-250MG
2 POWDER IN PACKET (EA) ORAL 2 TIMES DAILY
Status: DISCONTINUED | OUTPATIENT
Start: 2021-07-23 | End: 2021-07-23

## 2021-07-23 RX ADMIN — POTASSIUM CHLORIDE 10 MEQ: 10 INJECTION, SOLUTION INTRAVENOUS at 12:00

## 2021-07-23 RX ADMIN — SODIUM CHLORIDE 40 MG: 9 INJECTION, SOLUTION INTRAMUSCULAR; INTRAVENOUS; SUBCUTANEOUS at 08:21

## 2021-07-23 RX ADMIN — Medication 10 ML: at 05:59

## 2021-07-23 RX ADMIN — DEXMEDETOMIDINE 0.4 MCG/KG/HR: 100 INJECTION, SOLUTION, CONCENTRATE INTRAVENOUS at 09:58

## 2021-07-23 RX ADMIN — PIPERACILLIN AND TAZOBACTAM 4.5 G: 4; .5 INJECTION, POWDER, LYOPHILIZED, FOR SOLUTION INTRAVENOUS; PARENTERAL at 11:14

## 2021-07-23 RX ADMIN — SODIUM PHOSPHATE, MONOBASIC, MONOHYDRATE 15 MMOL: 276; 142 INJECTION, SOLUTION INTRAVENOUS at 09:04

## 2021-07-23 RX ADMIN — PIPERACILLIN AND TAZOBACTAM 4.5 G: 4; .5 INJECTION, POWDER, LYOPHILIZED, FOR SOLUTION INTRAVENOUS; PARENTERAL at 18:00

## 2021-07-23 RX ADMIN — POTASSIUM & SODIUM PHOSPHATES POWDER PACK 280-160-250 MG 2 PACKET: 280-160-250 PACK at 09:20

## 2021-07-23 RX ADMIN — FENTANYL CITRATE 75 MCG/HR: 0.05 INJECTION, SOLUTION INTRAMUSCULAR; INTRAVENOUS at 03:13

## 2021-07-23 RX ADMIN — Medication 10 ML: at 14:00

## 2021-07-23 RX ADMIN — POTASSIUM CHLORIDE 10 MEQ: 10 INJECTION, SOLUTION INTRAVENOUS at 09:19

## 2021-07-23 RX ADMIN — ENOXAPARIN SODIUM 40 MG: 40 INJECTION SUBCUTANEOUS at 09:20

## 2021-07-23 RX ADMIN — POTASSIUM CHLORIDE 10 MEQ: 10 INJECTION, SOLUTION INTRAVENOUS at 08:19

## 2021-07-23 RX ADMIN — SODIUM PHOSPHATE, MONOBASIC, MONOHYDRATE 3 MMOL: 276; 142 INJECTION, SOLUTION INTRAVENOUS at 20:38

## 2021-07-23 RX ADMIN — Medication 10 ML: at 20:39

## 2021-07-23 RX ADMIN — FENTANYL CITRATE 50 MCG: 50 INJECTION, SOLUTION INTRAMUSCULAR; INTRAVENOUS at 19:30

## 2021-07-23 RX ADMIN — POTASSIUM CHLORIDE 10 MEQ: 10 INJECTION, SOLUTION INTRAVENOUS at 10:42

## 2021-07-23 RX ADMIN — PIPERACILLIN AND TAZOBACTAM 4.5 G: 4; .5 INJECTION, POWDER, LYOPHILIZED, FOR SOLUTION INTRAVENOUS; PARENTERAL at 03:16

## 2021-07-23 RX ADMIN — 0.12% CHLORHEXIDINE GLUCONATE 10 ML: 1.2 RINSE ORAL at 08:21

## 2021-07-23 NOTE — PROGRESS NOTES
Cardiology Progress Note        Patient: Jolene Pina        Sex: male          DOA: 7/21/2021  YOB: 1985      Age:  30 y.o.        LOS:  LOS: 2 days   Assessment/Plan     Principal Problem:    ARDS (adult respiratory distress syndrome) ([facility] [address] (7/21/2021)    Active Problems:    Polysubstance abuse ([address] (6/15/2013)      Drug overdose, multiple drugs (6/15/2013)      EZEQUIEL (acute kidney injury) ([facility] [address] (3/10/2016)      Encephalopathy (3/10/2016)      Acute respiratory failure with hypoxia and hypercapnia ([address] (3/13/2016)      Aspiration pneumonia ([facility] [address] (7/21/2021)      Alcohol abuse (7/21/2021)      Hepatitis-C (7/21/2021)        Plan:    Cardiac telemetry stable. Please avoid any beta-blocker due to cocaine abuse  cardiomyopathy without any overt heart failure. Patient is extubated. Patient is complaining of generalized body aches and pains. Discussed with patient about lifestyle changes. I will consider stress test on Monday if stable to exclude ischemic etiology of cardiomyopathy. Subjective:    cc:  Cardiomyopathy        REVIEW OF SYSTEMS:      limited due to intubation    Objective:      Visit Vitals  /75   Pulse 80   Temp 99.1 °F (37.3 °C)   Resp 20   Ht 5' 8\" (1.727 m)   Wt 81.3 kg (179 lb 3.7 oz)   SpO2 98%   BMI 27.25 kg/m²     Body mass index is 27.25 kg/m². Physical Exam:  General Appearance:  intubated  NECK: No JVD, no thyroidomeglay. LUNGS: Clear bilaterally. HEART: S1+S2 audible,    ABD: Non-tender, BS Audible    EXT: No edema, and no cysnosis. VASCULAR EXAM: Pulses are intact.     PSYCHIATRIC EXAM:  Limited due to intubation    Medication:  Current Facility-Administered Medications   Medication Dose Route Frequency    potassium, sodium phosphates (NEUTRA-PHOS) packet 2 Packet  2 Packet Oral BID    dexmedeTOMidine (PRECEDEX) 400 mcg in 0.9% sodium chloride 100 mL infusion  0.1-1.5 mcg/kg/hr IntraVENous TITRATE    fentaNYL citrate (PF) injection 50 mcg  50 mcg IntraVENous Q4H PRN    enoxaparin (LOVENOX) injection 40 mg  40 mg SubCUTAneous Q24H    sodium chloride (NS) flush 5-10 mL  5-10 mL IntraVENous PRN    chlorhexidine (PERIDEX) 0.12 % mouthwash 10 mL  10 mL Oral Q12H    pantoprazole (PROTONIX) 40 mg in 0.9% sodium chloride 10 mL injection  40 mg IntraVENous Q24H    ELECTROLYTE REPLACEMENT PROTOCOL - Potassium Renal Dosing  1 Each Other PRN    ELECTROLYTE REPLACEMENT PROTOCOL - Magnesium   1 Each Other PRN    ELECTROLYTE REPLACEMENT PROTOCOL  - Phosphorus Renal Dosing  1 Each Other PRN    ELECTROLYTE REPLACEMENT PROTOCOL - Calcium   1 Each Other PRN    0.9% sodium chloride infusion  75 mL/hr IntraVENous CONTINUOUS    insulin lispro (HUMALOG) injection   SubCUTAneous Q6H    glucose chewable tablet 16 g  4 Tablet Oral PRN    glucagon (GLUCAGEN) injection 1 mg  1 mg IntraMUSCular PRN    dextrose (D50W) injection syrg 12.5-25 g  25-50 mL IntraVENous PRN    piperacillin-tazobactam (ZOSYN) 4.5 g in 0.9% sodium chloride (MBP/ADV) 100 mL MBP  4.5 g IntraVENous Q8H    LORazepam (ATIVAN) injection 4 mg  4 mg IntraVENous Q4H PRN    sodium chloride (NS) flush 5-40 mL  5-40 mL IntraVENous Q8H    sodium chloride (NS) flush 5-40 mL  5-40 mL IntraVENous PRN    acetaminophen (TYLENOL) tablet 650 mg  650 mg Oral Q6H PRN    Or    acetaminophen (TYLENOL) suppository 650 mg  650 mg Rectal Q6H PRN    polyethylene glycol (MIRALAX) packet 17 g  17 g Oral DAILY PRN    ondansetron (ZOFRAN ODT) tablet 4 mg  4 mg Oral Q8H PRN    Or    ondansetron (ZOFRAN) injection 4 mg  4 mg IntraVENous Q6H PRN    sodium chloride (NS) flush 5-40 mL  5-40 mL IntraVENous PRN    LORazepam (ATIVAN) tablet 1 mg  1 mg Oral Q1H PRN    Or    LORazepam (ATIVAN) injection 1 mg  1 mg IntraVENous Q1H PRN    LORazepam (ATIVAN) tablet 2 mg  2 mg Oral Q1H PRN    Or    LORazepam (ATIVAN) injection 2 mg  2 mg IntraVENous Q1H PRN    LORazepam (ATIVAN) injection 3 mg  3 mg IntraVENous Q15MIN PRN               Lab/Data Reviewed:  Procedures/imaging: see electronic medical records for all procedures/Xrays   and details which were not copied into this note but were reviewed prior to creation of Plan       All lab results for the last 24 hours reviewed. Recent Labs     07/23/21  0520 07/22/21  0400 07/21/21  0704   WBC 12.2 17.3* 13.5*   HGB 12.5* 13.9 16.9*   HCT 37.6 41.7 52.6*   * 146 223     Recent Labs     07/23/21  0520 07/22/21  0400 07/21/21  0704    138 139   K 3.5 4.2 4.0    108 99*   CO2 26 22 24   GLU 89 103* 135*   BUN 13 21* 20*   CREA 0.65 1.01 1.80*   CA 8.1* 8.5 9.4       RADIOLOGY:  CT Results  (Last 48 hours)    None        CXR Results  (Last 48 hours)               07/23/21 0620  XR CHEST PORT Final result    Impression:          1. Support devices in stable/appropriate position as visualized. 2. Patchy bilateral perihilar predominant airspace disease, unchanged. Narrative:  EXAM: XR CHEST PORT       CLINICAL INDICATION/HISTORY: intubated   -Additional: None       COMPARISON: July 22, 2021       TECHNIQUE: Portable frontal view of the chest       _______________       FINDINGS:       SUPPORT DEVICES: Endotracheal tube and visualized nasogastric tube both project   in stable position. HEART AND MEDIASTINUM: Stable, normal cardiac size and mediastinal contours       LUNGS AND PLEURAL SPACES: Patchy bilateral airspace disease with perihilar   predominance again noted. Appearance is similar allowing for slightly diminished   lung volumes on this morning's exam. No pneumothorax or pleural effusion. BONY THORAX AND SOFT TISSUES: Unremarkable.       _______________           07/22/21 0640  XR CHEST PORT Final result    Impression:      Improving bilateral pulmonary infiltrates with faint residual bilateral   opacities.             Narrative:  EXAM: XR CHEST PORT       CLINICAL INDICATION/HISTORY: intubated -Additional: None       COMPARISON: 1 day prior        TECHNIQUE: Portable frontal view of the chest       _______________       FINDINGS:       SUPPORT DEVICES: Endotracheal tube in the midthoracic trachea. Frank  HEART AND MEDIASTINUM: Cardiomediastinal silhouette within normal limits. LUNGS AND PLEURAL SPACES: Improving bilateral pulmonary infiltrates with faint   residual bilateral opacities. No large effusion or pneumothorax.       _______________                   Cardiology Procedures:   Results for orders placed or performed during the hospital encounter of 07/21/21   EKG, 12 LEAD, INITIAL   Result Value Ref Range    Ventricular Rate 130 BPM    Atrial Rate 130 BPM    P-R Interval 134 ms    QRS Duration 80 ms    Q-T Interval 318 ms    QTC Calculation (Bezet) 467 ms    Calculated P Axis 77 degrees    Calculated R Axis 90 degrees    Calculated T Axis 60 degrees    Diagnosis       Sinus tachycardia with fusion complexes  Rightward axis  Borderline ECG  When compared with ECG of 28-DEC-2017 14:54,  fusion complexes are now present  Vent.  rate has increased BY  57 BPM  Non-specific change in ST segment in Inferior leads  Confirmed by Pura Luo MD. ([id number]) on 7/21/2021 10:20:03 PM        Echo Results  (Last 48 hours)    None       Cardiolite (Tc-99m Sestamibi) stress test    Signed By: Boo Santos MD     July 23, 2021

## 2021-07-23 NOTE — PROGRESS NOTES
1930- Report and care received, assessment completed per flow sheet. Alert, calm, oriented, c/o generalized all over soreness and a headache, see MAR. IVF and precedex infusing without difficulty. 2330- Reassessment without change. 0400- Reassessment without change.

## 2021-07-23 NOTE — PROGRESS NOTES
1945- Report and care received, assessment completed per flow sheet. Intubated, sedated, was initially calm, however, became restless with stimulation. Follows some commands, soft bilateral wrist restraints in place to protect integrity of lines/tubes, flow sheet in progress. 0000- Reassessment without change, NAD.    0400- Reassessment without change.

## 2021-07-23 NOTE — PROGRESS NOTES
Pulmonary Specialists  Pulmonary, Critical Care, and Sleep Medicine    Name: Kristan Raza MRN: [id number]   : 1985 Hospital: [facility] [place]    Date: 2021  Room: [address] [place] [address] Note                                              Consult requesting physician: Dr. Coreen Saldivar  Reason for Consult: Acute respiratory failure; overdose      Subjective/History of Present Illness:     Patient is a 30 y.o. male with PMHx significant for diabetes mellitus type 2, substance abuse, anxiety was found down by sister of unknown duration. Patient was unresponsive. The sister tried to give Narcan with no response. The police came in gave some Narcan with some improvement in mentation. When EMS arrived, patient was poorly responsive, and very hypoxemic. He was brought to the emergency room where he remained unresponsive and hypoxemic. He needed to be intubated. Postintubation, he remained severely hypoxemic with sats in the 50s to 60s.    2021  Patient in ICU; intubated on ventilator support. Patient is on mild sedation-midazolam 2 mg/h; fentanyl 75 mcg/h. Patient off paralytics since yesterday afternoon. No acute issues overnight; patient remains afebrile. Telemetry-sinus rhythm; blood pressure is good. Patient no longer on pressors. Urine output-good. No emesis or diarrhea reported. Review of symptoms  Known patient with polysubstance abuse. Prior UDS has shown amphetamines, benzodiazepines, cocaine and opiates. Limited review of symptoms due to patient condition. No Known Allergies   Past Medical History:   Diagnosis Date    Drug abuse, [facility] [address] [facility]     Heroin abuse ([facility] [address]     Psychiatric diagnosis     ADHD    Psychiatric diagnosis     drug abuse.  Psychiatric disorder     anxiety      No past surgical history on file.    Social History     Tobacco Use    Smoking status: Current Every Day Smoker     Packs/day: 1.00    Smokeless tobacco: Never Used Substance Use Topics    Alcohol use: Yes     Comment: daily      History reviewed. No pertinent family history. Prior to Admission medications    Medication Sig Start Date End Date Taking? Authorizing Provider   amphetamine-dextroamphetamine XR (Adderall XR) 20 mg XR capsule Take 20 mg by mouth three (3) times daily. Sage, MD Celia     Current Facility-Administered Medications   Medication Dose Route Frequency    potassium chloride 10 mEq in 100 ml IVPB  10 mEq IntraVENous Q1H    sodium phosphate 15 mmol in 0.9% sodium chloride 250 mL infusion  15 mmol IntraVENous ONCE    enoxaparin (LOVENOX) injection 40 mg  40 mg SubCUTAneous Q24H    fentaNYL (PF) 900 mcg/30 ml infusion soln  0-200 mcg/hr IntraVENous TITRATE    chlorhexidine (PERIDEX) 0.12 % mouthwash 10 mL  10 mL Oral Q12H    pantoprazole (PROTONIX) 40 mg in 0.9% sodium chloride 10 mL injection  40 mg IntraVENous Q24H    0.9% sodium chloride infusion  75 mL/hr IntraVENous CONTINUOUS    insulin lispro (HUMALOG) injection   SubCUTAneous Q6H    piperacillin-tazobactam (ZOSYN) 4.5 g in 0.9% sodium chloride (MBP/ADV) 100 mL MBP  4.5 g IntraVENous Q8H    midazolam in normal saline (VERSED) 1 mg/mL infusion  0-10 mg/hr IntraVENous TITRATE    sodium chloride (NS) flush 5-40 mL  5-40 mL IntraVENous Q8H         Objective:   Vital Signs:    Visit Vitals  /77   Pulse 78   Temp 99.3 °F (37.4 °C)   Resp 17   Ht 5' 8\" (1.727 m)   Wt 81.3 kg (179 lb 3.7 oz)   SpO2 100%   BMI 27.25 kg/m²       O2 Device: Endotracheal tube, Ventilator   O2 Flow Rate (L/min): 15 l/min   Temp (24hrs), Av.1 °F (37.3 °C), Min:98.4 °F (36.9 °C), Max:99.7 °F (37.6 °C)       Intake/Output:   Last shift:      No intake/output data recorded.     Last 3 shifts:  1901 -  0700  In: 1600.1 [I.V.:1450.1]  Out: 2500 [Urine:2250]      Intake/Output Summary (Last 24 hours) at 2021 0838  Last data filed at 2021 0559  Gross per 24 hour   Intake 525.23 ml   Output 1170 ml   Net -644.77 ml       Last 3 Recorded Weights in this Encounter    07/21/21 0841 07/22/21 0800 07/23/21 0819   Weight: 81.6 kg (180 lb) 82 kg (180 lb 12.4 oz) 81.3 kg (179 lb 3.7 oz)       ABG:    Lab Results   Component Value Date/Time    PHI 7.42 07/23/2021 05:05 AM    PCO2I 35.9 07/23/2021 05:05 AM    PO2I 78 (L) 07/23/2021 05:05 AM    HCO3I 23.5 07/23/2021 05:05 AM    FIO2I 30 07/23/2021 05:05 AM     Ventilator Mode: Pressure control  Respiratory Rate  Back-Up Rate: 12  Insp Time (sec): 0.9 sec  I:E Ratio: 1:3.2  Ventilator Volumes  Vt Set (ml):  (Target 500)  Vt Exhaled (Machine Breath) (ml): 479 ml  Ve Observed (l/min): 6.52 l/min  Ventilator Pressures  PC Set: 15  Pressure Support (cm H2O): 16 cm H2O  PIP Observed (cm H2O): 20 cm H2O  Plateau Pressure (cm H2O): 15 cm H2O  MAP (cm H2O): 8.6  PEEP/VENT (cm H2O): 5 cm H20  Auto PEEP Observed (cm H2O): 0.9 cm H2O       Physical Exam:   Patient remains on ventilator support; arousable; acyanotic; no obvious facial or head injuries  HEENT: pupils not dilated, reactive, no scleral jaundice, moist oral mucosa, no nasal drainage  Neck: No adenopathy or thyroid swelling  Orotracheal and orogastric tube-no significant secretions noted   Chest Wall-no obvious injuries or bruises  CVS: Telemetry-sinus rhythm; S1-S2; no murmurs heard; JVD not elevated    RS: Symmetrical breath sounds; air entry moderate to good bilaterally; no wheezes; no significant crackles heard; not tachypneic or in distress while on vent support  Abd: Soft, nontender, not distended, no guarding or rigidity; bowel sounds present; no hepatosplenomegaly  No bruising in abdominal wall  Neuro: Intubated, sedated, arousable; moving all extremities  Extrm: no leg edema or swelling or clubbing  Skin: no rash  Lymphatic: no cervical or supraclavicular adenopathy  Vasc: Good pulses- DP and PT in right foot and left foot; no findings of distal ischemia changes in the lower extremities.   MS: No joint swelling  Right groin: Right femoral central and arterial lines-no bleeding or hematoma or erythema noted-examined.       Data:       Recent Results (from the past 24 hour(s))   POC VENOUS BLOOD GAS    Collection Time: 07/22/21  9:38 AM   Result Value Ref Range    Device: ADULT VENT      FIO2 (POC) 40 %    pH, venous (POC) 7.38 7.32 - 7.42      pCO2, venous (POC) 35.3 (L) 41 - 51 MMHG    pO2, venous (POC) 89 (H) 25 - 40 mmHg    HCO3, venous (POC) 20.8 (L) 23.0 - 28.0 MMOL/L    sO2, venous (POC) 96.7 (H) 65 - 88 %    Base deficit, venous (POC) 3.8 mmol/L    Mode PRESSURE CONTROL      Set Rate 16 bpm    PEEP/CPAP (POC) 5 cmH2O    Site DRAWN FROM ARTERIAL LINE      Specimen type (POC) VENOUS BLOOD      Performed by Felicia Pierre)    PHOSPHORUS    Collection Time: 07/22/21 10:55 AM   Result Value Ref Range    Phosphorus 2.4 (L) 2.5 - 4.9 MG/DL   MAGNESIUM    Collection Time: 07/22/21 10:55 AM   Result Value Ref Range    Magnesium 2.2 1.6 - 2.6 mg/dL   GLUCOSE, POC    Collection Time: 07/22/21  1:04 PM   Result Value Ref Range    Glucose (POC) 94 70 - 110 mg/dL   GLUCOSE, POC    Collection Time: 07/22/21  5:50 PM   Result Value Ref Range    Glucose (POC) 99 70 - 110 mg/dL   GLUCOSE, POC    Collection Time: 07/22/21 11:33 PM   Result Value Ref Range    Glucose (POC) 99 70 - 110 mg/dL   BLOOD GAS, ARTERIAL POC    Collection Time: 07/23/21  5:05 AM   Result Value Ref Range    Device: ADULT VENT      FIO2 (POC) 30 %    pH (POC) 7.42 7.35 - 7.45      pCO2 (POC) 35.9 35.0 - 45.0 MMHG    pO2 (POC) 78 (L) 80 - 100 MMHG    HCO3 (POC) 23.5 22 - 26 MMOL/L    sO2 (POC) 95.8 92 - 97 %    Base deficit (POC) 0.7 mmol/L    Mode PRESSURE CONTROL      Tidal volume 500 ml    Set Rate 16 bpm    PEEP/CPAP (POC) 5 cmH2O    PIP (POC) 21      Allens test (POC) NOT APPLICABLE      Inspiratory Time 0.9 sec    Site DRAWN FROM ARTERIAL LINE      Specimen type (POC) ARTERIAL      Performed by Kirsten Shahid)    CBC WITH AUTOMATED DIFF Collection Time: 07/23/21  5:20 AM   Result Value Ref Range    WBC 12.2 4.6 - 13.2 K/uL    RBC 4.16 (L) 4.35 - 5.65 M/uL    HGB 12.5 (L) 13.0 - 16.0 g/dL    HCT 37.6 36.0 - 48.0 %    MCV 90.4 74.0 - 97.0 FL    MCH 30.0 24.0 - 34.0 PG    MCHC 33.2 31.0 - 37.0 g/dL    RDW 11.7 11.6 - 14.5 %    PLATELET 658 (L) 774 - 420 K/uL    MPV 11.2 9.2 - 11.8 FL    NEUTROPHILS 83 (H) 40 - 73 %    BAND NEUTROPHILS 8 (H) 0 - 5 %    LYMPHOCYTES 7 (L) 21 - 52 %    MONOCYTES 1 (L) 3 - 10 %    EOSINOPHILS 1 0 - 5 %    BASOPHILS 0 0 - 2 %    ABS. NEUTROPHILS 11.1 (H) 1.8 - 8.0 K/UL    ABS. LYMPHOCYTES 0.9 0.9 - 3.6 K/UL    ABS. MONOCYTES 0.1 0.05 - 1.2 K/UL    ABS. EOSINOPHILS 0.1 0.0 - 0.4 K/UL    ABS. BASOPHILS 0.0 0.0 - 0.1 K/UL    DF MANUAL      RBC COMMENTS NORMOCYTIC, NORMOCHROMIC     MAGNESIUM    Collection Time: 07/23/21  5:20 AM   Result Value Ref Range    Magnesium 1.9 1.6 - 2.6 mg/dL   PHOSPHORUS    Collection Time: 07/23/21  5:20 AM   Result Value Ref Range    Phosphorus 1.1 (L) 2.5 - 4.9 MG/DL   METABOLIC PANEL, COMPREHENSIVE    Collection Time: 07/23/21  5:20 AM   Result Value Ref Range    Sodium 138 136 - 145 mmol/L    Potassium 3.5 3.5 - 5.5 mmol/L    Chloride 105 100 - 111 mmol/L    CO2 26 21 - 32 mmol/L    Anion gap 7 3.0 - 18 mmol/L    Glucose 89 74 - 99 mg/dL    BUN 13 7.0 - 18 MG/DL    Creatinine 0.65 0.6 - 1.3 MG/DL    BUN/Creatinine ratio 20 12 - 20      GFR est AA >60 >60 ml/min/1.73m2    GFR est non-AA >60 >60 ml/min/1.73m2    Calcium 8.1 (L) 8.5 - 10.1 MG/DL    Bilirubin, total 0.8 0.2 - 1.0 MG/DL    ALT (SGPT) 127 (H) 16 - 61 U/L    AST (SGOT) 166 (H) 10 - 38 U/L    Alk.  phosphatase 76 45 - 117 U/L    Protein, total 5.4 (L) 6.4 - 8.2 g/dL    Albumin 2.6 (L) 3.4 - 5.0 g/dL    Globulin 2.8 2.0 - 4.0 g/dL    A-G Ratio 0.9 0.8 - 1.7     CALCIUM, IONIZED    Collection Time: 07/23/21  5:20 AM   Result Value Ref Range    Ionized Calcium 1.15 1.12 - 1.32 MMOL/L   GLUCOSE, POC    Collection Time: 07/23/21  5:50 AM Result Value Ref Range    Glucose (POC) 93 70 - 110 mg/dL         Chemistry Recent Labs     07/23/21  0520 07/22/21  1055 07/22/21  0400 07/21/21  0704 07/21/21  0704   GLU 89  --  103*  --  135*     --  138  --  139   K 3.5  --  4.2  --  4.0     --  108  --  99*   CO2 26  --  22  --  24   BUN 13  --  21*  --  20*   CREA 0.65  --  1.01  --  1.80*   CA 8.1*  --  8.5  --  9.4   MG 1.9 2.2 1.8   < > 3.1*   PHOS 1.1* 2.4* 2.4*  --   --    AGAP 7  --  8  --  16   BUCR 20  --  21*  --  11*   AP 76  --  75  --  117   TP 5.4*  --  5.6*  --  7.8   ALB 2.6*  --  3.0*  --  4.4   GLOB 2.8  --  2.6  --  3.4   AGRAT 0.9  --  1.2  --  1.3    < > = values in this interval not displayed. Lactic Acid Lactic acid   Date Value Ref Range Status   07/22/2021 1.8 0.4 - 2.0 MMOL/L Final     Recent Labs     07/22/21  0400 07/21/21  1950 07/21/21  1500   LAC 1.8 2.3* 1.7        Liver Enzymes Protein, total   Date Value Ref Range Status   07/23/2021 5.4 (L) 6.4 - 8.2 g/dL Final     Albumin   Date Value Ref Range Status   07/23/2021 2.6 (L) 3.4 - 5.0 g/dL Final     Globulin   Date Value Ref Range Status   07/23/2021 2.8 2.0 - 4.0 g/dL Final     A-G Ratio   Date Value Ref Range Status   07/23/2021 0.9 0.8 - 1.7   Final     Alk.  phosphatase   Date Value Ref Range Status   07/23/2021 76 45 - 117 U/L Final     Recent Labs     07/23/21  0520 07/22/21  0400 07/21/21  0704   TP 5.4* 5.6* 7.8   ALB 2.6* 3.0* 4.4   GLOB 2.8 2.6 3.4   AGRAT 0.9 1.2 1.3   AP 76 75 117        CBC w/Diff Recent Labs     07/23/21  0520 07/22/21  0400 07/21/21  0704   WBC 12.2 17.3* 13.5*   RBC 4.16* 4.64 5.61   HGB 12.5* 13.9 16.9*   HCT 37.6 41.7 52.6*   * 146 223   GRANS 83* 55 63   LYMPH 7* 0* 19*   EOS 1 0 2        Cardiac Enzymes No results found for: CPK, CK, CKMMB, CKMB, RCK3, CKMBT, CKNDX, CKND1, BUD, TROPT, TROIQ, NICOLE, TROPT, TNIPOC, BNP, BNPP     BNP No results found for: BNP, BNPP, XBNPT     Coagulation Recent Labs     07/21/21  0704 PTP 14.5   INR 1.2   APTT 29.6         Thyroid  Lab Results   Component Value Date/Time    TSH 1.49 03/11/2016 02:55 AM       No results found for: T4     Urinalysis Lab Results   Component Value Date/Time    Color YELLOW 07/21/2021 08:00 AM    Appearance CLEAR 07/21/2021 08:00 AM    Specific gravity 1.009 07/21/2021 08:00 AM    pH (UA) 5.5 07/21/2021 08:00 AM    Protein Negative 07/21/2021 08:00 AM    Glucose Negative 07/21/2021 08:00 AM    Ketone Negative 07/21/2021 08:00 AM    Bilirubin Negative 07/21/2021 08:00 AM    Urobilinogen 0.2 07/21/2021 08:00 AM    Nitrites Negative 07/21/2021 08:00 AM    Leukocyte Esterase Negative 07/21/2021 08:00 AM          June 2013  Hep C virus Ab Interp. NEGATIVE POSITIVEAbnormal         Culture data during this hospitalization. All Micro Results     Procedure Component Value Units Date/Time    RESPIRATORY VIRUS PANEL W/COVID-19, PCR [[id number]]  (Abnormal) Collected: 07/21/21 2230    Order Status: Completed Specimen: Nasopharyngeal Updated: 07/22/21 1135     Adenovirus Not detected        Coronavirus 229E Not detected        Coronavirus HKU1 Not detected        Coronavirus CVNL63 Not detected        Coronavirus OC43 Not detected        SARS-CoV-2, PCR       NEGATIVE (Kavon Rater). TEST PERFORMED AT [employer]            [facility] Not detected        Rhinovirus and Enterovirus Not detected        Influenza A Not detected        Influenza B Not detected        Parainfluenza 1 Not detected        Parainfluenza 2 Not detected        Parainfluenza 3 Not detected        Parainfluenza virus 4 Not detected        RSV by PCR Not detected        B. parapertussis, PCR Not detected        Bordetella pertussis - PCR Not detected        Chlamydophila pneumoniae DNA, QL, PCR Not detected        Mycoplasma pneumoniae DNA, QL, PCR Not detected       CULTURE, BLOOD [[id number]] Collected: 07/21/21 0704    Order Status: Completed Specimen: Blood Updated: 07/22/21 0806     Special Requests: NO SPECIAL REQUESTS        Culture result: NO GROWTH 1 DAY       CULTURE, BLOOD [[id number]] Collected: 07/21/21 0730    Order Status: Completed Specimen: Blood Updated: 07/22/21 0806     Special Requests: NO SPECIAL REQUESTS        Culture result: NO GROWTH 1 DAY       COVID-19 RAPID TEST [[id number]] Collected: 07/21/21 2310    Order Status: Completed Specimen: Nasopharyngeal Updated: 07/21/21 2336     Specimen source Nasopharyngeal        COVID-19 rapid test Not detected        Comment: Rapid Abbott ID Now       Rapid NAAT:  The specimen is NEGATIVE for SARS-CoV-2, the novel coronavirus associated with COVID-19. Negative results should be treated as presumptive and, if inconsistent with clinical signs and symptoms or necessary for patient management, should be tested with an alternative molecular assay. Negative results do not preclude SARS-CoV-2 infection and should not be used as the sole basis for patient management decisions. This test has been authorized by the FDA under an Emergency Use Authorization (EUA) for use by authorized laboratories. Fact sheet for Healthcare Providers: ConventionUpdate.co.nz  Fact sheet for Patients: ConventionUpdate.co.nz       Methodology: Isothermal Nucleic Acid Amplification         CULTURE, RESPIRATORY/SPUTUM/BRONCH Cherylle Sees [[id number]]     Order Status: Sent Specimen: Sputum from Tracheal Aspirate            ECHO 7/21/2021 Interpretation Summary  Result status: Final result ·   Echo study was technically difficulty and limited due to patient's condition. Image quality for this study was suboptimal. Contrast used: DEFINITY. · LV: Estimated LVEF is 15 - 20%. Normal cavity size and wall thickness. Severely reduced systolic function. Unable to assess diastolic function due to elevated heart rate. · RV: Moderately dilated right ventricle. Reduced systolic function. Assessment of RV function: TAPSE= 9mm.   · TV: Normal valve structure and no stenosis. Tricuspid regurgitation is inadequate for estimation of right ventricular systolic pressure. · IVC: Mechanically ventilated; cannot use inferior caval vein diameter to estimate central venous pressure. Images report reviewed by me:  CT 7/21 (Most Recent)  Results from Hospital Encounter encounter on 07/21/21    CT HEAD WO CONT    Narrative  EXAM: CT head    INDICATION: Altered mental status. COMPARISON: None. TECHNIQUE: Axial CT imaging of the head was performed without intravenous  contrast. Standard multiplanar coronal and sagittal reformatted images were  obtained and are included in interpretation. One or more dose reduction techniques were used on this CT: automated exposure  control, adjustment of the mAs and/or kVp according to patient size, and  iterative reconstruction techniques. The specific techniques used on this CT  exam have been documented in the patient's electronic medical record. Digital  Imaging and Communications in Medicine (DICOM) format image data are available  to nonaffiliated external healthcare facilities or entities on a secure, media  free, reciprocally searchable basis with patient authorization for at least a  12-month period after this study. _______________    FINDINGS:    BRAIN AND POSTERIOR FOSSA:  Brain    EXTRA-AXIAL SPACES AND MENINGES: There are no abnormal extra-axial fluid  collections. CALVARIUM: Intact. SINUSES: Right maxillary sinus mucosal thickening. OTHER: Fluid secretions within the posterior nasopharynx. _______________    Impression  No acute intracranial abnormality. Right maxillary sinus mucosal thickening. Fluid secretions within the posterior  nasopharynx.          CXR reviewed by me:  XR 7/23 Results from Hospital Encounter encounter on 07/21/21    XR CHEST PORT    Narrative  EXAM: XR CHEST PORT    CLINICAL INDICATION/HISTORY: intubated  -Additional: None    COMPARISON: July 22, 2021    TECHNIQUE: Portable frontal view of the chest    _______________    FINDINGS:    SUPPORT DEVICES: Endotracheal tube and visualized nasogastric tube both project  in stable position. HEART AND MEDIASTINUM: Stable, normal cardiac size and mediastinal contours    LUNGS AND PLEURAL SPACES: Patchy bilateral airspace disease with perihilar  predominance again noted. Appearance is similar allowing for slightly diminished  lung volumes on this morning's exam. No pneumothorax or pleural effusion. BONY THORAX AND SOFT TISSUES: Unremarkable.    _______________    Impression  1. Support devices in stable/appropriate position as visualized. 2. Patchy bilateral perihilar predominant airspace disease, unchanged. IMPRESSION:   · Acute respiratory failure with hypoxemia and hypercapnia  · ARDS  · Drug overdose  · Aspiration pneumonia  · Polysubstance abuse-cocaine, amphetamine  · Lactic acidosis  · Acute renal failure  · Alcohol abuse  · Hepatitis C infection  ·   ·   Patient Active Problem List   Diagnosis Code    Polysubstance abuse ([facility] [address] F19.10    Drug overdose, multiple drugs T50.911A    Elevated LFTs R79.89    Overdose of heroin ([facility] [address] T40.1X1A    EZEQUIEL (acute kidney injury) ([facility] [address] N17.9    ATN (acute tubular necrosis) ([facility]) N17.0    Elevated troponin R77.8    Lactic acidosis E87.2    Encephalopathy G93.40    Acute respiratory failure with hypoxia and hypercapnia (HCC) J96.01, J96.02    Hypokalemia E87.6    ARDS (adult respiratory distress syndrome) ([facility] [address] J80    Aspiration pneumonia (HCC) J69.0    Alcohol abuse F10.10    Hepatitis-C B19.20       · Code status: Full code      RECOMMENDATIONS:   Respiratory: Patient intubated due to drug overdose and severe respiratory failure; severe hypoxemia post intubation in the ER. Acute lung injury on presentation-differential diagnosis of aspiration pneumonitis versus cocaine induced lung injury. Day 3 of intubation.   Stable respirations; PEEP 5, FiO2 30%; PIP 20; minute ventilation less than 10 L; ABG-PaO2/FiO2 ratio 260; chest x-ray had shown improvement compared to admission-persistent bilateral perihilar and midlung opacities; no pleural effusions or pneumothorax; ET tube and OG tube in good positions. Stop sedation this morning; weaning trials and plan for trial of extubation if okay. Ventilator and sedation bundles reviewed. Keep SPO2 >=90%. HOB 30 degree elevation all the time. Aggressive pulmonary toileting. Aspiration precautions. CVS: Stable hemodynamics; patient off pressors; monitor blood pressure; avoid beta-blockers due to cocaine use. Echocardiogram shows severe cardiomyopathy with EF 65-99%-[id number] versus alcoholic versus cocaine induced; appreciate cardiology evaluation. No significant elevation in troponins; proBNP very low. ID: Antibiotics-Zosyn-complete 7 days therapy. Blood cultures negative from admission; lactic acid-normalized; WBC improved. Rapid Covid test, and SARS-CoV-2 PCR negative. Respiratory viral panel negative as well. Hematology/Oncology: Stable hemoglobin; no active bleeding issues; platelets drifting down-monitor. Renal: Renal function has become normal; creatinine normal.  Replace Phos and K- Neutra-Phos ordered twice daily. GI: Hold tube feeding for weaning trials. PPI prophylaxis. History of hepatitis C and cocaine with alcohol abuse; continue to monitor transaminases which are mildly elevated. Endocrine: Monitor blood sugars. Neurology: CT head-nil acute on admission. Patient arousable, nodding head and responsive. Toxicology: Urine drug screen positive for  cocaine and amphetamines; high suspicion for synthetic opiate; patient also alcoholic. Serum acetaminophen and salicylate negative. Skin/Wound: ICU nursing care. Electrolytes: Replace electrolytes per ICU electrolyte replacement protocol. IVF: Continue normal saline 75 mL/h  Nutrition: Nutrition consult for tube feeding.    Prophylaxis: DVT Prophylaxis: Enoxaparin [monitor platelets]. GI Prophylaxis: Protonix. Restraints: none while on paralytic  Lines/Tubes: PIVs  ETT: 7/21   OGT/NGT: 7/21   Orellana: 7/21 (Medically necessary for strict input/output monitoring in critically ill patient, will remove it when not needed. Orellana bundle followed). Right femoral central and arterial lines 7/21 (medically necessary; no bleeding or hematoma or evidence of infection; discontinue catheters when patient medically stable and no longer needed]. These lines likely can be discontinued today after extubation. Will defer respective systems problem management to primary and other respective consultant and follow patient in ICU with primary and other medical team.  Further recommendations will be based on the patient's response to recommended treatment and results of the investigation ordered. Quality Care: PPI, DVT prophylaxis, HOB elevated, Infection control all reviewed and addressed. Care of plan d/w ICU nurses, RT. Updating parents daily. High complexity decision making was performed during the evaluation of this patient at high risk for decompensation with multiple organ involvement. Total critical care time spent rendering care exclusive of procedures/family discussion/coordination of care: 42 minutes. Please note: Voice-recognition software may have been used to generate this report, which may have resulted in some phonetic-based errors in grammar and contents. Even though attempts were made to correct all the mistakes, some may have been missed, and remained in the body of the document.       Murray Zafar MD  7/23/2021

## 2021-07-23 NOTE — PROGRESS NOTES
SLP NOTE - SLP evaluation orders received. However, upon completion of chart review, pt not appropriate for SLP evaluation at this time. Currently, patient is:    []  Tolerating current po diet (per RN report)  []  Tolerating current po diet; however, poor po intake (per Rn report)   []  Receiving nutrition via tube feeding  []  Lethargic, solomnent, or difficult to arouse for safe po trials     []  Unable to manage secretions  []  Receiving intervention for respiratory distress  [x]  <6 hours s/p extubation   []  Other:     Please contact SLP with questions/concerns. SLP will follow up next day. Thank you for this referral.    Antoinette Casillas, [id number] [facility]  Speech-Language Pathologist

## 2021-07-23 NOTE — PROGRESS NOTES
Hospitalist Progress Note    Patient: Ulises Edwards MRN: [id number]  CSN: [id number]    YOB: 1985  Age: 30 y.o. Sex: male    DOA: 7/21/2021 LOS:  LOS: 1 day                Assessment/Plan     Patient Active Problem List   Diagnosis Code    Polysubstance abuse ([facility] F19.10    Drug overdose, multiple drugs T50.911A    Elevated LFTs R79.89    Overdose of heroin ([facility] T40.1X1A    EZEQUIEL (acute kidney injury) ([facility] N17.9    ATN (acute tubular necrosis) ([facility]) N17.0    Elevated troponin R77.8    Lactic acidosis E87.2    Encephalopathy G93.40    Acute respiratory failure with hypoxia and hypercapnia (HCC) J96.01, J96.02    Hypokalemia E87.6    ARDS (adult respiratory distress syndrome) ([facility] J80    Aspiration pneumonia ([facility] J69.0    Alcohol abuse F10.10    Hepatitis-C B19.20            Ulises Edwards is a 30 y.o. male with history of for polysubstance abuse was found unresponsive. He was intubated in ER due to severe hypoxia. Off nimbex, awake, follows commands. Orally intubated      CRITICAL CARE PLAN     Resp -   Acute respiratory failure with hypoxia   Continue vent management per intensivist   see vent orders, VAP bundle. HOB>30 degrees. Bronchodilators. Follow sputum cx. Daily CXR.        ARDS and aspiration pneumonia  Continue IV antibiotics and supportive treatment  Sepsis protocol was started in ER     ID - Follow up blood and urine cx. ANTIBIOTICS Continue vancomycin , Zosyn and Levaquin. Rapid COVID 19 negative.     CVS -   Tachycardia:   Cardiac enzymes with mild elevation in troponin  Echo with severely reduced LVF, EF of 15-20%  Cannot use betablocker secondary to cocaine use  Starting on ACE-I/ARB BP permitting.     Heme/onc -   follow H&H, plts. INR.     Renal -   EZEQUIEL : Creatinine 1.8  IV hydration, trend BUN, Cr, follow I/O, narvaez in place. Check and replace Mg, K, phos.   ICU electrolyte replacement protocol        Endocrine -  Follow FSG  Sliding scale for glucose control     Neuro/ Pain/ Sedation - Fentanyl, versed, propofol and nimbex      Unresponsiveness encephalopathy  CT head no acute process      GI - NPO for now. protonix   HCV:     Psych : polysubstance abuse, and alcohol abuse   uds positive for of amphentermine, cocaine    Prophylaxis - DVT: heparin, GI: protonix    Discussed with Dr. Violeta Goins      [id number]  35 minutes of critical care time spent in the direct evaluation and treatment of this high risk patient. The reason for providing this level of medical care for this critically ill patient was due a critical illness that impaired one or more vital organ systems such that there was a high probability of imminent or life threatening deterioration in the patients condition. This care involved high complexity decision making to assess, manipulate, and support vital system functions, to treat this degreee vital organ system failure and to prevent further life threatening deterioration of the patients condition. Disposition : TBD    Physical Exam:  General: As above    HEENT: NC, Atraumatic.   anicteric sclerae. Lungs: CTA Bilaterally. No Wheezing/Rhonchi/Rales. Heart:  S1 S2,  No murmur, No Rubs, No Gallops  Abdomen: Soft, Non distended, Non tender.  +Bowel sounds,   Extremities: No c/c/e          Vital signs/Intake and Output:  Visit Vitals  /73   Pulse 94   Temp 99.5 °F (37.5 °C)   Resp 16   Ht 5' 8\" (1.727 m)   Wt 82 kg (180 lb 12.4 oz)   SpO2 99%   BMI 27.49 kg/m²     Current Shift:  No intake/output data recorded.   Last three shifts:  07/21 0701 - 07/22 1900  In: 2713.5 [I.V.:2713.5]  Out: 3300 [Urine:2750]            Labs: Results:       Chemistry Recent Labs     07/22/21  0400 07/21/21  0704   * 135*    139   K 4.2 4.0    99*   CO2 22 24   BUN 21* 20*   CREA 1.01 1.80*   CA 8.5 9.4   AGAP 8 16   BUCR 21* 11*   AP 75 117   TP 5.6* 7.8   ALB 3.0* 4.4   GLOB 2.6 3.4   AGRAT 1.2 1.3      CBC w/Diff Recent Labs     07/22/21  0400 07/21/21  0704   WBC 17.3* 13.5*   RBC 4.64 5.61   HGB 13.9 16.9*   HCT 41.7 52.6*    223   GRANS 55 63   LYMPH 0* 19*   EOS 0 2      Cardiac Enzymes Recent Labs     07/21/21  1950 07/21/21  1115   * 535*   CKND1 0.7 0.8      Coagulation Recent Labs     07/21/21  0704   PTP 14.5   INR 1.2   APTT 29.6       Lipid Panel No results found for: CHOL, CHOLPOCT, CHOLX, CHLST, CHOLV, 104531, HDL, HDLP, LDL, LDLC, DLDLP, 655353, VLDLC, VLDL, TGLX, TRIGL, TRIGP, TGLPOCT, CHHD, CHHDX   BNP No results for input(s): BNPP in the last 72 hours.    Liver Enzymes Recent Labs     07/22/21  0400   TP 5.6*   ALB 3.0*   AP 75      Thyroid Studies Lab Results   Component Value Date/Time    TSH 1.49 03/11/2016 02:55 AM        Procedures/imaging: see electronic medical records for all procedures/Xrays and details which were not copied into this note but were reviewed prior to creation of Plan

## 2021-07-23 NOTE — PROGRESS NOTES
0730 Bedside and Verbal shift change report given to OSIEL Hwang (oncoming nurse) by Zbigniew Loyola RN (offgoing nurse). Report included the following information SBAR, Kardex, Intake/Output, MAR, Recent Results and Cardiac Rhythm SR-ST.     0800 pt assessed at this time, documented per flow sheet, pt intubated tolerating well on mech vent, narvaez in place, OGT placement verified, no signs of distress, no temp noted, pt denies pain, pt is arousable and responds to voice, can follow commands, VSS will continue to monitor    0900 rounds completed with MD on floor, goal of care is to extubate today, pt pending sedation vacation and SBT.    1102 pt successfully extubated and placed on 2LNC, VSS no signs of distress, pt tolerating well, will continue to monitor    1200 pt reassessed VSS, no change will continue to monitor    1600 pt reassessed VSS no change will continue to monitor    1800 pt continues to remain calm and cooperative on precedex, pt resting in bed quietly, no signs of distress on NC, no additional requests at this time will continue to monitor    1900 Bedside and Verbal shift change report given to Zbigniew Loyola RN (oncoming nurse) by OSIEL Hwang (offgoing nurse).  Report included the following information SBAR, Kardex, Intake/Output, MAR, Recent Results and Cardiac Rhythm SR.

## 2021-07-24 ENCOUNTER — APPOINTMENT (OUTPATIENT)
Dept: GENERAL RADIOLOGY | Age: 36
DRG: 917 | End: 2021-07-24
Attending: INTERNAL MEDICINE
Payer: COMMERCIAL

## 2021-07-24 LAB
ALBUMIN SERPL-MCNC: 2.6 G/DL (ref 3.4–5)
ALBUMIN/GLOB SERPL: 1 {RATIO} (ref 0.8–1.7)
ALP SERPL-CCNC: 65 U/L (ref 45–117)
ALT SERPL-CCNC: 97 U/L (ref 16–61)
ANION GAP SERPL CALC-SCNC: 5 MMOL/L (ref 3–18)
AST SERPL-CCNC: 140 U/L (ref 10–38)
BASOPHILS # BLD: 0 K/UL (ref 0–0.1)
BASOPHILS NFR BLD: 0 % (ref 0–2)
BILIRUB SERPL-MCNC: 0.7 MG/DL (ref 0.2–1)
BUN SERPL-MCNC: 15 MG/DL (ref 7–18)
BUN/CREAT SERPL: 23 (ref 12–20)
CA-I SERPL-SCNC: 1.1 MMOL/L (ref 1.12–1.32)
CALCIUM SERPL-MCNC: 8.4 MG/DL (ref 8.5–10.1)
CHLORIDE SERPL-SCNC: 111 MMOL/L (ref 100–111)
CO2 SERPL-SCNC: 26 MMOL/L (ref 21–32)
CREAT SERPL-MCNC: 0.64 MG/DL (ref 0.6–1.3)
DIFFERENTIAL METHOD BLD: ABNORMAL
EOSINOPHIL # BLD: 0.1 K/UL (ref 0–0.4)
EOSINOPHIL NFR BLD: 1 % (ref 0–5)
ERYTHROCYTE [DISTWIDTH] IN BLOOD BY AUTOMATED COUNT: 11.4 % (ref 11.6–14.5)
GLOBULIN SER CALC-MCNC: 2.5 G/DL (ref 2–4)
GLUCOSE BLD STRIP.AUTO-MCNC: 112 MG/DL (ref 70–110)
GLUCOSE BLD STRIP.AUTO-MCNC: 133 MG/DL (ref 70–110)
GLUCOSE BLD STRIP.AUTO-MCNC: 83 MG/DL (ref 70–110)
GLUCOSE SERPL-MCNC: 75 MG/DL (ref 74–99)
HCT VFR BLD AUTO: 34.5 % (ref 36–48)
HGB BLD-MCNC: 11.5 G/DL (ref 13–16)
LYMPHOCYTES # BLD: 0.8 K/UL (ref 0.9–3.6)
LYMPHOCYTES NFR BLD: 9 % (ref 21–52)
MAGNESIUM SERPL-MCNC: 1.9 MG/DL (ref 1.6–2.6)
MCH RBC QN AUTO: 29.9 PG (ref 24–34)
MCHC RBC AUTO-ENTMCNC: 33.3 G/DL (ref 31–37)
MCV RBC AUTO: 89.8 FL (ref 74–97)
MONOCYTES # BLD: 0.5 K/UL (ref 0.05–1.2)
MONOCYTES NFR BLD: 5 % (ref 3–10)
NEUTS SEG # BLD: 7.5 K/UL (ref 1.8–8)
NEUTS SEG NFR BLD: 84 % (ref 40–73)
PHOSPHATE SERPL-MCNC: 2.4 MG/DL (ref 2.5–4.9)
PLATELET # BLD AUTO: 134 K/UL (ref 135–420)
PMV BLD AUTO: 11.1 FL (ref 9.2–11.8)
POTASSIUM SERPL-SCNC: 3.7 MMOL/L (ref 3.5–5.5)
PROT SERPL-MCNC: 5.1 G/DL (ref 6.4–8.2)
RBC # BLD AUTO: 3.84 M/UL (ref 4.35–5.65)
SODIUM SERPL-SCNC: 142 MMOL/L (ref 136–145)
WBC # BLD AUTO: 9 K/UL (ref 4.6–13.2)

## 2021-07-24 PROCEDURE — 80053 COMPREHEN METABOLIC PANEL: CPT

## 2021-07-24 PROCEDURE — 74011000250 HC RX REV CODE- 250: Performed by: INTERNAL MEDICINE

## 2021-07-24 PROCEDURE — 74011250637 HC RX REV CODE- 250/637: Performed by: INTERNAL MEDICINE

## 2021-07-24 PROCEDURE — 36415 COLL VENOUS BLD VENIPUNCTURE: CPT

## 2021-07-24 PROCEDURE — 71045 X-RAY EXAM CHEST 1 VIEW: CPT

## 2021-07-24 PROCEDURE — C9113 INJ PANTOPRAZOLE SODIUM, VIA: HCPCS | Performed by: INTERNAL MEDICINE

## 2021-07-24 PROCEDURE — 74011250636 HC RX REV CODE- 250/636: Performed by: EMERGENCY MEDICINE

## 2021-07-24 PROCEDURE — 74011000258 HC RX REV CODE- 258: Performed by: EMERGENCY MEDICINE

## 2021-07-24 PROCEDURE — 82330 ASSAY OF CALCIUM: CPT

## 2021-07-24 PROCEDURE — 74011250637 HC RX REV CODE- 250/637: Performed by: HOSPITALIST

## 2021-07-24 PROCEDURE — 94640 AIRWAY INHALATION TREATMENT: CPT

## 2021-07-24 PROCEDURE — 74011250636 HC RX REV CODE- 250/636: Performed by: INTERNAL MEDICINE

## 2021-07-24 PROCEDURE — 83735 ASSAY OF MAGNESIUM: CPT

## 2021-07-24 PROCEDURE — 85025 COMPLETE CBC W/AUTO DIFF WBC: CPT

## 2021-07-24 PROCEDURE — 74011000258 HC RX REV CODE- 258: Performed by: INTERNAL MEDICINE

## 2021-07-24 PROCEDURE — 65660000000 HC RM CCU STEPDOWN

## 2021-07-24 PROCEDURE — 82962 GLUCOSE BLOOD TEST: CPT

## 2021-07-24 PROCEDURE — 84100 ASSAY OF PHOSPHORUS: CPT

## 2021-07-24 PROCEDURE — 74011250637 HC RX REV CODE- 250/637: Performed by: FAMILY MEDICINE

## 2021-07-24 PROCEDURE — 74011250636 HC RX REV CODE- 250/636: Performed by: HOSPITALIST

## 2021-07-24 RX ORDER — ALBUTEROL SULFATE 0.83 MG/ML
2.5 SOLUTION RESPIRATORY (INHALATION)
Status: DISCONTINUED | OUTPATIENT
Start: 2021-07-24 | End: 2021-07-25

## 2021-07-24 RX ORDER — MAGNESIUM SULFATE 1 G/100ML
1 INJECTION INTRAVENOUS ONCE
Status: COMPLETED | OUTPATIENT
Start: 2021-07-24 | End: 2021-07-24

## 2021-07-24 RX ORDER — MAGNESIUM SULFATE HEPTAHYDRATE 40 MG/ML
INJECTION, SOLUTION INTRAVENOUS
Status: DISCONTINUED
Start: 2021-07-24 | End: 2021-07-24 | Stop reason: WASHOUT

## 2021-07-24 RX ORDER — LOPERAMIDE HYDROCHLORIDE 2 MG/1
2 CAPSULE ORAL
Status: DISCONTINUED | OUTPATIENT
Start: 2021-07-24 | End: 2021-07-27 | Stop reason: HOSPADM

## 2021-07-24 RX ORDER — CALCIUM GLUCONATE 20 MG/ML
1 INJECTION, SOLUTION INTRAVENOUS ONCE
Status: COMPLETED | OUTPATIENT
Start: 2021-07-24 | End: 2021-07-24

## 2021-07-24 RX ORDER — CALCIUM CARB/MAGNESIUM CARB 311-232MG
5 TABLET ORAL
Status: DISCONTINUED | OUTPATIENT
Start: 2021-07-24 | End: 2021-07-27 | Stop reason: HOSPADM

## 2021-07-24 RX ADMIN — PIPERACILLIN AND TAZOBACTAM 4.5 G: 4; .5 INJECTION, POWDER, LYOPHILIZED, FOR SOLUTION INTRAVENOUS; PARENTERAL at 10:47

## 2021-07-24 RX ADMIN — PIPERACILLIN AND TAZOBACTAM 4.5 G: 4; .5 INJECTION, POWDER, LYOPHILIZED, FOR SOLUTION INTRAVENOUS; PARENTERAL at 03:31

## 2021-07-24 RX ADMIN — ACETAMINOPHEN 650 MG: 325 TABLET ORAL at 20:23

## 2021-07-24 RX ADMIN — Medication 10 ML: at 14:00

## 2021-07-24 RX ADMIN — ENOXAPARIN SODIUM 40 MG: 40 INJECTION SUBCUTANEOUS at 09:21

## 2021-07-24 RX ADMIN — POTASSIUM PHOSPHATE, MONOBASIC AND POTASSIUM PHOSPHATE, DIBASIC: 224; 236 INJECTION, SOLUTION, CONCENTRATE INTRAVENOUS at 12:14

## 2021-07-24 RX ADMIN — SODIUM CHLORIDE 75 ML/HR: 900 INJECTION, SOLUTION INTRAVENOUS at 17:14

## 2021-07-24 RX ADMIN — LORAZEPAM 1 MG: 1 TABLET ORAL at 14:11

## 2021-07-24 RX ADMIN — Medication 10 ML: at 06:00

## 2021-07-24 RX ADMIN — CALCIUM GLUCONATE 1000 MG: 20 INJECTION, SOLUTION INTRAVENOUS at 09:44

## 2021-07-24 RX ADMIN — LORAZEPAM 1 MG: 1 TABLET ORAL at 20:22

## 2021-07-24 RX ADMIN — PIPERACILLIN AND TAZOBACTAM 4.5 G: 4; .5 INJECTION, POWDER, LYOPHILIZED, FOR SOLUTION INTRAVENOUS; PARENTERAL at 19:00

## 2021-07-24 RX ADMIN — LOPERAMIDE HYDROCHLORIDE 2 MG: 2 CAPSULE ORAL at 12:16

## 2021-07-24 RX ADMIN — ALBUTEROL SULFATE 2.5 MG: 2.5 SOLUTION RESPIRATORY (INHALATION) at 20:30

## 2021-07-24 RX ADMIN — MAGNESIUM SULFATE HEPTAHYDRATE 1 G: 1 INJECTION, SOLUTION INTRAVENOUS at 10:46

## 2021-07-24 RX ADMIN — SODIUM CHLORIDE 40 MG: 9 INJECTION, SOLUTION INTRAMUSCULAR; INTRAVENOUS; SUBCUTANEOUS at 09:21

## 2021-07-24 RX ADMIN — DEXMEDETOMIDINE 0.2 MCG/KG/HR: 100 INJECTION, SOLUTION, CONCENTRATE INTRAVENOUS at 04:25

## 2021-07-24 RX ADMIN — Medication 5 MG: at 21:58

## 2021-07-24 NOTE — PROGRESS NOTES
Hospitalist Progress Note    Patient: Freda Mcgrath MRN: [id number]  [facility]: [id number]    YOB: 1985  Age: 30 y.o. Sex: male    DOA: 7/21/2021 LOS:  LOS: 2 days                Assessment/Plan     Patient Active Problem List   Diagnosis Code    [facility] F19.10    Drug overdose, multiple drugs T50.911A    Elevated LFTs R79.89    Overdose of heroin ([facility] T40.1X1A    EZEQUIEL (acute kidney injury) ([facility] N17.9    ATN (acute tubular necrosis) ([facility]) N17.0    Elevated troponin R77.8    Lactic acidosis E87.2    Encephalopathy G93.40    Acute respiratory failure with hypoxia and hypercapnia (HCC) J96.01, J96.02    Hypokalemia E87.6    ARDS (adult respiratory distress syndrome) ([facility] J80    Aspiration pneumonia ([facility] J69.0    Alcohol abuse F10.10    Hepatitis-C B19.20            Freda Mcgrath is a 30 y.o. male with history of for polysubstance abuse was found unresponsive. He was intubated in ER due to severe hypoxia. Awake, alert, answers to questions. CRITICAL CARE PLAN     Resp -   Acute respiratory failure with hypoxia   Extubated 07/23/2021   ARDS and aspiration pneumonia       ID - Follow up blood and urine cx. ANTIBIOTICS zosyn. Rapid COVID 19 negative.     CVS -   Tachycardia:   Cardiac enzymes with mild elevation in troponin  Echo with severely reduced LVF, EF of 15-20%  Cannot use betablocker secondary to cocaine use  Start on ACE-I/ARB BP permitting.     Heme/onc -   follow H&H, plts. INR.     Renal -   EZEQUIEL Creatinine 1.8  IV hydration, trend BUN, Cr, follow I/O, narvaez in place. Check and replace Mg, K, phos. ICU electrolyte replacement protocol.      Endocrine -  Follow FSG  Sliding scale for glucose control     Neuro/ Pain/ Sedation -   Stable mental status.     Unresponsiveness encephalopathy  CT head no acute process      GI - NPO for now.  protonix      Psych -   polysubstance abuse, and alcohol abuse   uds positive for of amphentermine, cocaine    Prophylaxis -   DVT: heparin, GI: protonix    Discussed with Dr. Guillermo Piper. Disposition : TBD    Physical Exam:  General: As above    HEENT: NC, Atraumatic.   anicteric sclerae. Lungs: CTA Bilaterally. No Wheezing/Rhonchi/Rales. Heart:  S1 S2,  No murmur, No Rubs, No Gallops  Abdomen: Soft, Non distended, Non tender.  +Bowel sounds,   Extremities: No c/c/e          Vital signs/Intake and Output:  Visit Vitals  BP (!) 106/54   Pulse 79   Temp 99.7 °F (37.6 °C)   Resp (!) 35   Ht 5' 8\" (1.727 m)   Wt 81.3 kg (179 lb 3.7 oz)   SpO2 98%   BMI 27.25 kg/m²     Current Shift:  07/23 1901 - 07/24 0700  In: -   Out: 250 [Urine:250]  Last three shifts:  07/22 0701 - 07/23 1900  In: 3191.7 [I.V.:3011.7]  Out: 6854 [Urine:4200]            Labs: Results:       Chemistry Recent Labs     07/23/21  1648 07/23/21  0520 07/22/21  0400 07/21/21  0704 07/21/21  0704   GLU  --  89 103*  --  135*   NA  --  138 138  --  139   K 4.2 3.5 4.2   < > 4.0   CL  --  105 108  --  99*   CO2  --  26 22  --  24   BUN  --  13 21*  --  20*   CREA  --  0.65 1.01  --  1.80*   CA  --  8.1* 8.5  --  9.4   AGAP  --  7 8  --  16   BUCR  --  20 21*  --  11*   AP  --  76 75  --  117   TP  --  5.4* 5.6*  --  7.8   ALB  --  2.6* 3.0*  --  4.4   GLOB  --  2.8 2.6  --  3.4   AGRAT  --  0.9 1.2  --  1.3    < > = values in this interval not displayed.       CBC w/Diff Recent Labs     07/23/21  0520 07/22/21  0400 07/21/21  0704   WBC 12.2 17.3* 13.5*   RBC 4.16* 4.64 5.61   HGB 12.5* 13.9 16.9*   HCT 37.6 41.7 52.6*   * 146 223   GRANS 83* 55 63   LYMPH 7* 0* 19*   EOS 1 0 2      Cardiac Enzymes Recent Labs     07/21/21  1950 07/21/21  1115   * 535*   CKND1 0.7 0.8      Coagulation Recent Labs     07/21/21  0704   PTP 14.5   INR 1.2   APTT 29.6       Lipid Panel No results found for: CHOL, CHOLPOCT, CHOLX, CHLST, CHOLV, 718992, HDL, HDLP, LDL, LDLC, DLDLP, 770666, VLDLC, VLDL, TGLX, TRIGL, TRIGP, TGLPOCT, CHHD, CHHDX BNP No results for input(s): BNPP in the last 72 hours.    Liver Enzymes Recent Labs     07/23/21  0520   TP 5.4*   ALB 2.6*   AP 76      Thyroid Studies Lab Results   Component Value Date/Time    TSH 1.49 03/11/2016 02:55 AM        Procedures/imaging: see electronic medical records for all procedures/Xrays and details which were not copied into this note but were reviewed prior to creation of Plan

## 2021-07-24 NOTE — PROGRESS NOTES
Cardiology Progress Note      7/24/2021 2:35 PM    Admit Date: 7/21/2021    Admit Diagnosis: ARDS (adult respiratory distress syndrome) (HCC) [J80]      Subjective:     Nola Frost denies chest pain, chest pressure/discomfort, dyspnea.     Visit Vitals  /74   Pulse 71   Temp 98.9 °F (37.2 °C)   Resp 18   Ht 5' 8\" (1.727 m)   Wt 81.3 kg (179 lb 3.7 oz)   SpO2 99%   BMI 27.25 kg/m²     Current Facility-Administered Medications   Medication Dose Route Frequency    potassium phosphate 20 mmol in 0.9% sodium chloride 250 mL infusion   IntraVENous ONCE    loperamide (IMODIUM) capsule 2 mg  2 mg Oral Q6H PRN    fentaNYL citrate (PF) injection 50 mcg  50 mcg IntraVENous Q4H PRN    enoxaparin (LOVENOX) injection 40 mg  40 mg SubCUTAneous Q24H    sodium chloride (NS) flush 5-10 mL  5-10 mL IntraVENous PRN    pantoprazole (PROTONIX) 40 mg in 0.9% sodium chloride 10 mL injection  40 mg IntraVENous Q24H    ELECTROLYTE REPLACEMENT PROTOCOL - Potassium Renal Dosing  1 Each Other PRN    ELECTROLYTE REPLACEMENT PROTOCOL - Magnesium   1 Each Other PRN    ELECTROLYTE REPLACEMENT PROTOCOL  - Phosphorus Renal Dosing  1 Each Other PRN    ELECTROLYTE REPLACEMENT PROTOCOL - Calcium   1 Each Other PRN    0.9% sodium chloride infusion  75 mL/hr IntraVENous CONTINUOUS    insulin lispro (HUMALOG) injection   SubCUTAneous Q6H    glucose chewable tablet 16 g  4 Tablet Oral PRN    glucagon (GLUCAGEN) injection 1 mg  1 mg IntraMUSCular PRN    dextrose (D50W) injection syrg 12.5-25 g  25-50 mL IntraVENous PRN    piperacillin-tazobactam (ZOSYN) 4.5 g in 0.9% sodium chloride (MBP/ADV) 100 mL MBP  4.5 g IntraVENous Q8H    LORazepam (ATIVAN) injection 4 mg  4 mg IntraVENous Q4H PRN    sodium chloride (NS) flush 5-40 mL  5-40 mL IntraVENous Q8H    sodium chloride (NS) flush 5-40 mL  5-40 mL IntraVENous PRN    acetaminophen (TYLENOL) tablet 650 mg  650 mg Oral Q6H PRN    Or    acetaminophen (TYLENOL) suppository 650 mg  650 mg Rectal Q6H PRN    polyethylene glycol (MIRALAX) packet 17 g  17 g Oral DAILY PRN    ondansetron (ZOFRAN ODT) tablet 4 mg  4 mg Oral Q8H PRN    Or    ondansetron (ZOFRAN) injection 4 mg  4 mg IntraVENous Q6H PRN    sodium chloride (NS) flush 5-40 mL  5-40 mL IntraVENous PRN    LORazepam (ATIVAN) tablet 1 mg  1 mg Oral Q1H PRN    Or    LORazepam (ATIVAN) injection 1 mg  1 mg IntraVENous Q1H PRN    LORazepam (ATIVAN) tablet 2 mg  2 mg Oral Q1H PRN    Or    LORazepam (ATIVAN) injection 2 mg  2 mg IntraVENous Q1H PRN    LORazepam (ATIVAN) injection 3 mg  3 mg IntraVENous Q15MIN PRN         Objective:      Physical Exam:  General:         As above    HEENT:           NC, Atraumatic.   anicteric sclerae. Lungs:            CTA Bilaterally. No Wheezing/Rhonchi/Rales.   Heart:              S1 S2,  No murmur, No Rubs, No Gallops  Abdomen:      Soft, Non distended, Non tender.  +Bowel sounds,     Data Review:   Labs:    Recent Results (from the past 24 hour(s))   PHOSPHORUS    Collection Time: 07/23/21  4:48 PM   Result Value Ref Range    Phosphorus 1.9 (L) 2.5 - 4.9 MG/DL   POTASSIUM    Collection Time: 07/23/21  4:48 PM   Result Value Ref Range    Potassium 4.2 3.5 - 5.5 mmol/L   GLUCOSE, POC    Collection Time: 07/23/21  6:27 PM   Result Value Ref Range    Glucose (POC) 90 70 - 110 mg/dL   GLUCOSE, POC    Collection Time: 07/23/21 11:35 PM   Result Value Ref Range    Glucose (POC) 99 70 - 110 mg/dL   MAGNESIUM    Collection Time: 07/24/21  4:45 AM   Result Value Ref Range    Magnesium 1.9 1.6 - 2.6 mg/dL   PHOSPHORUS    Collection Time: 07/24/21  4:45 AM   Result Value Ref Range    Phosphorus 2.4 (L) 2.5 - 4.9 MG/DL   CALCIUM, IONIZED    Collection Time: 07/24/21  4:45 AM   Result Value Ref Range    Ionized Calcium 1.10 (L) 1.12 - 1.32 MMOL/L   CBC WITH AUTOMATED DIFF    Collection Time: 07/24/21  4:45 AM   Result Value Ref Range    WBC 9.0 4.6 - 13.2 K/uL    RBC 3.84 (L) 4.35 - 5.65 M/uL    HGB 11.5 (L) 13.0 - 16.0 g/dL    HCT 34.5 (L) 36.0 - 48.0 %    MCV 89.8 74.0 - 97.0 FL    MCH 29.9 24.0 - 34.0 PG    MCHC 33.3 31.0 - 37.0 g/dL    RDW 11.4 (L) 11.6 - 14.5 %    PLATELET 706 (L) 534 - 420 K/uL    MPV 11.1 9.2 - 11.8 FL    NEUTROPHILS 84 (H) 40 - 73 %    LYMPHOCYTES 9 (L) 21 - 52 %    MONOCYTES 5 3 - 10 %    EOSINOPHILS 1 0 - 5 %    BASOPHILS 0 0 - 2 %    ABS. NEUTROPHILS 7.5 1.8 - 8.0 K/UL    ABS. LYMPHOCYTES 0.8 (L) 0.9 - 3.6 K/UL    ABS. MONOCYTES 0.5 0.05 - 1.2 K/UL    ABS. EOSINOPHILS 0.1 0.0 - 0.4 K/UL    ABS. BASOPHILS 0.0 0.0 - 0.1 K/UL    DF AUTOMATED     METABOLIC PANEL, COMPREHENSIVE    Collection Time: 07/24/21  4:45 AM   Result Value Ref Range    Sodium 142 136 - 145 mmol/L    Potassium 3.7 3.5 - 5.5 mmol/L    Chloride 111 100 - 111 mmol/L    CO2 26 21 - 32 mmol/L    Anion gap 5 3.0 - 18 mmol/L    Glucose 75 74 - 99 mg/dL    BUN 15 7.0 - 18 MG/DL    Creatinine 0.64 0.6 - 1.3 MG/DL    BUN/Creatinine ratio 23 (H) 12 - 20      GFR est AA >60 >60 ml/min/1.73m2    GFR est non-AA >60 >60 ml/min/1.73m2    Calcium 8.4 (L) 8.5 - 10.1 MG/DL    Bilirubin, total 0.7 0.2 - 1.0 MG/DL    ALT (SGPT) 97 (H) 16 - 61 U/L    AST (SGOT) 140 (H) 10 - 38 U/L    Alk.  phosphatase 65 45 - 117 U/L    Protein, total 5.1 (L) 6.4 - 8.2 g/dL    Albumin 2.6 (L) 3.4 - 5.0 g/dL    Globulin 2.5 2.0 - 4.0 g/dL    A-G Ratio 1.0 0.8 - 1.7     GLUCOSE, POC    Collection Time: 07/24/21  5:33 AM   Result Value Ref Range    Glucose (POC) 83 70 - 110 mg/dL   GLUCOSE, POC    Collection Time: 07/24/21 11:51 AM   Result Value Ref Range    Glucose (POC) 133 (H) 70 - 110 mg/dL       Telemetry: normal sinus rhythm      Assessment:     Principal Problem:    ARDS (adult respiratory distress syndrome) ([facility] (7/21/2021)    Active Problems:    Polysubstance abuse ([facility] (6/15/2013)      Drug overdose, multiple drugs (6/15/2013)      EZEQUIEL (acute kidney injury) ([facility] (3/10/2016)      Encephalopathy (3/10/2016)      Acute respiratory failure with hypoxia and hypercapnia ([facility] (3/13/2016)      Aspiration pneumonia ([facility] (7/21/2021)      Alcohol abuse (7/21/2021)      Hepatitis-C (7/21/2021)        Plan:     Guideline directed medical therapy.   Nuclear stress test Alanis Kwan MD

## 2021-07-24 NOTE — ROUTINE PROCESS
Bedside shift change report given to Batool Major RN (oncoming nurse) by Tobin Brady RN (offgoing nurse). Report included the following information SBAR, Kardex, Intake/Output and MAR.

## 2021-07-24 NOTE — PROGRESS NOTES
Pulmonary Specialists  Pulmonary, Critical Care, and Sleep Medicine    Name: Myrna Tyson MRN: [id number]   : 1985 Hospital: [facility] [place]    Date: 2021  Room: [address] [place] [address] Note                                              Consult requesting physician: Dr. Jacinto Puckett  Reason for Consult: Acute respiratory failure; overdose      Subjective/History of Present Illness:     Patient is a 30 y.o. male with PMHx significant for diabetes mellitus type 2, substance abuse, anxiety was found down by sister of unknown duration. Patient was unresponsive. The sister tried to give Narcan with no response. The police came in gave some Narcan with some improvement in mentation. When EMS arrived, patient was poorly responsive, and very hypoxemic. He was brought to the emergency room where he remained unresponsive and hypoxemic. He needed to be intubated. Postintubation, he remained severely hypoxemic with sats in the 50s to 60s.    2021  Patient in ICU. Patient extubated yesterday. He is awake and alert. Breathing is good. No chest pain or palpitations per  No vomiting. Afebrile; heart rate-sinus rhythm; blood pressure good. Urine output good. Precedex infusion-low-dose. Review of symptoms  Known patient with polysubstance abuse. Prior UDS has shown amphetamines, benzodiazepines, cocaine and opiates.  -No fever or chills  -No chest pain or palpitations  -No cough or shortness of breath or wheezing  -No vomiting or diarrhea      No Known Allergies   Past Medical History:   Diagnosis Date    Drug [facility] IV [facility]     Heroin abuse ([facility]     Psychiatric diagnosis     ADHD    Psychiatric diagnosis     drug abuse.  Psychiatric disorder     anxiety      No past surgical history on file.    Social History     Tobacco Use    Smoking status: Current Every Day Smoker     Packs/day: 1.00    Smokeless tobacco: Never Used   Substance Use Topics    Alcohol use: Yes     Comment: daily      History reviewed. No pertinent family history. Prior to Admission medications    Medication Sig Start Date End Date Taking? Authorizing Provider   amphetamine-dextroamphetamine XR (Adderall XR) 20 mg XR capsule Take 20 mg by mouth three (3) times daily. Sage, MD Celia     Current Facility-Administered Medications   Medication Dose Route Frequency    enoxaparin (LOVENOX) injection 40 mg  40 mg SubCUTAneous Q24H    pantoprazole (PROTONIX) 40 mg in 0.9% sodium chloride 10 mL injection  40 mg IntraVENous Q24H    0.9% sodium chloride infusion  75 mL/hr IntraVENous CONTINUOUS    insulin lispro (HUMALOG) injection   SubCUTAneous Q6H    piperacillin-tazobactam (ZOSYN) 4.5 g in 0.9% sodium chloride (MBP/ADV) 100 mL MBP  4.5 g IntraVENous Q8H    sodium chloride (NS) flush 5-40 mL  5-40 mL IntraVENous Q8H         Objective:   Vital Signs:    Visit Vitals  /62   Pulse 62   Temp 98.5 °F (36.9 °C)   Resp 30   Ht 5' 8\" (1.727 m)   Wt 81.3 kg (179 lb 3.7 oz)   SpO2 96%   BMI 27.25 kg/m²       O2 Device: Nasal cannula   O2 Flow Rate (L/min): 3 l/min   Temp (24hrs), Av °F (37.2 °C), Min:98.5 °F (36.9 °C), Max:99.7 °F (37.6 °C)       Intake/Output:   Last shift:      No intake/output data recorded.     Last 3 shifts:  1901 -  0700  In: 3943.4 [I.V.:3763.4]  Out: 4365 [Urine:4350]      Intake/Output Summary (Last 24 hours) at 2021 0915  Last data filed at 2021 0618  Gross per 24 hour   Intake 2630.69 ml   Output 3050 ml   Net -419.31 ml       Last 3 Recorded Weights in this Encounter    21 0841 21 0800 21 0819   Weight: 81.6 kg (180 lb) 82 kg (180 lb 12.4 oz) 81.3 kg (179 lb 3.7 oz)        Physical Exam:   Patient awake and alert; acyanotic; on nasal cannula oxygen; no obvious facial or head injuries  HEENT: pupils not dilated, no scleral jaundice  Neck: No adenopathy or thyroid swelling  CVS: S1-S2; no murmur; JVD not elevated; telemetry-sinus rhythm  RS: Good air entry bilateral; no wheezing or crackles; normal respirations   Abd: Soft, no tenderness, no distention; no guarding or rigidity; bowel sounds present; no hepatosplenomegaly  Neuro: Awake, alert, nonfocal exam; moving all extremities  Extrm: no leg edema or swelling or clubbing  Skin: no rash  Lymphatic: no cervical or supraclavicular adenopathy  Vasc: Good pulses in feet   MS: No joint swelling  Right groin: Right femoral catheters have been removed; no bleeding or hematoma       Data:       Recent Results (from the past 24 hour(s))   GLUCOSE, POC    Collection Time: 07/23/21 11:41 AM   Result Value Ref Range    Glucose (POC) 85 70 - 110 mg/dL   PHOSPHORUS    Collection Time: 07/23/21  4:48 PM   Result Value Ref Range    Phosphorus 1.9 (L) 2.5 - 4.9 MG/DL   POTASSIUM    Collection Time: 07/23/21  4:48 PM   Result Value Ref Range    Potassium 4.2 3.5 - 5.5 mmol/L   GLUCOSE, POC    Collection Time: 07/23/21  6:27 PM   Result Value Ref Range    Glucose (POC) 90 70 - 110 mg/dL   GLUCOSE, POC    Collection Time: 07/23/21 11:35 PM   Result Value Ref Range    Glucose (POC) 99 70 - 110 mg/dL   MAGNESIUM    Collection Time: 07/24/21  4:45 AM   Result Value Ref Range    Magnesium 1.9 1.6 - 2.6 mg/dL   PHOSPHORUS    Collection Time: 07/24/21  4:45 AM   Result Value Ref Range    Phosphorus 2.4 (L) 2.5 - 4.9 MG/DL   CALCIUM, IONIZED    Collection Time: 07/24/21  4:45 AM   Result Value Ref Range    Ionized Calcium 1.10 (L) 1.12 - 1.32 MMOL/L   CBC WITH AUTOMATED DIFF    Collection Time: 07/24/21  4:45 AM   Result Value Ref Range    WBC 9.0 4.6 - 13.2 K/uL    RBC 3.84 (L) 4.35 - 5.65 M/uL    HGB 11.5 (L) 13.0 - 16.0 g/dL    HCT 34.5 (L) 36.0 - 48.0 %    MCV 89.8 74.0 - 97.0 FL    MCH 29.9 24.0 - 34.0 PG    MCHC 33.3 31.0 - 37.0 g/dL    RDW 11.4 (L) 11.6 - 14.5 %    PLATELET 472 (L) 212 - 420 K/uL    MPV 11.1 9.2 - 11.8 FL    NEUTROPHILS 84 (H) 40 - 73 %    LYMPHOCYTES 9 (L) 21 - 52 %    MONOCYTES 5 3 - 10 % EOSINOPHILS 1 0 - 5 %    BASOPHILS 0 0 - 2 %    ABS. NEUTROPHILS 7.5 1.8 - 8.0 K/UL    ABS. LYMPHOCYTES 0.8 (L) 0.9 - 3.6 K/UL    ABS. MONOCYTES 0.5 0.05 - 1.2 K/UL    ABS. EOSINOPHILS 0.1 0.0 - 0.4 K/UL    ABS. BASOPHILS 0.0 0.0 - 0.1 K/UL    DF AUTOMATED     METABOLIC PANEL, COMPREHENSIVE    Collection Time: 07/24/21  4:45 AM   Result Value Ref Range    Sodium 142 136 - 145 mmol/L    Potassium 3.7 3.5 - 5.5 mmol/L    Chloride 111 100 - 111 mmol/L    CO2 26 21 - 32 mmol/L    Anion gap 5 3.0 - 18 mmol/L    Glucose 75 74 - 99 mg/dL    BUN 15 7.0 - 18 MG/DL    Creatinine 0.64 0.6 - 1.3 MG/DL    BUN/Creatinine ratio 23 (H) 12 - 20      GFR est AA >60 >60 ml/min/1.73m2    GFR est non-AA >60 >60 ml/min/1.73m2    Calcium 8.4 (L) 8.5 - 10.1 MG/DL    Bilirubin, total 0.7 0.2 - 1.0 MG/DL    ALT (SGPT) 97 (H) 16 - 61 U/L    AST (SGOT) 140 (H) 10 - 38 U/L    Alk.  phosphatase 65 45 - 117 U/L    Protein, total 5.1 (L) 6.4 - 8.2 g/dL    Albumin 2.6 (L) 3.4 - 5.0 g/dL    Globulin 2.5 2.0 - 4.0 g/dL    A-G Ratio 1.0 0.8 - 1.7     GLUCOSE, POC    Collection Time: 07/24/21  5:33 AM   Result Value Ref Range    Glucose (POC) 83 70 - 110 mg/dL         Chemistry Recent Labs     07/24/21  0445 07/23/21  1648 07/23/21  0520 07/22/21  1055 07/22/21  1055 07/22/21  0400 07/22/21  0400   GLU 75  --  89  --   --   --  103*     --  138  --   --   --  138   K 3.7 4.2 3.5  --   --    < > 4.2     --  105  --   --   --  108   CO2 26  --  26  --   --   --  22   BUN 15  --  13  --   --   --  21*   CREA 0.64  --  0.65  --   --   --  1.01   CA 8.4*  --  8.1*  --   --   --  8.5   MG 1.9  --  1.9  --  2.2   < > 1.8   PHOS 2.4* 1.9* 1.1*   < > 2.4*   < > 2.4*   AGAP 5  --  7  --   --   --  8   BUCR 23*  --  20  --   --   --  21*   AP 65  --  76  --   --   --  75   TP 5.1*  --  5.4*  --   --   --  5.6*   ALB 2.6*  --  2.6*  --   --   --  3.0*   GLOB 2.5  --  2.8  --   --   --  2.6   AGRAT 1.0  --  0.9  --   --   --  1.2    < > = values in this interval not displayed. Lactic Acid Lactic acid   Date Value Ref Range Status   07/22/2021 1.8 0.4 - 2.0 MMOL/L Final     Recent Labs     07/22/21  0400 07/21/21  1950 07/21/21  1500   LAC 1.8 2.3* 1.7        Liver Enzymes Protein, total   Date Value Ref Range Status   07/24/2021 5.1 (L) 6.4 - 8.2 g/dL Final     Albumin   Date Value Ref Range Status   07/24/2021 2.6 (L) 3.4 - 5.0 g/dL Final     Globulin   Date Value Ref Range Status   07/24/2021 2.5 2.0 - 4.0 g/dL Final     A-G Ratio   Date Value Ref Range Status   07/24/2021 1.0 0.8 - 1.7   Final     Alk. phosphatase   Date Value Ref Range Status   07/24/2021 65 45 - 117 U/L Final     Recent Labs     07/24/21  0445 07/23/21  0520 07/22/21  0400   TP 5.1* 5.4* 5.6*   ALB 2.6* 2.6* 3.0*   GLOB 2.5 2.8 2.6   AGRAT 1.0 0.9 1.2   AP 65 76 75        CBC w/Diff Recent Labs     07/24/21  0445 07/23/21  0520 07/22/21  0400   WBC 9.0 12.2 17.3*   RBC 3.84* 4.16* 4.64   HGB 11.5* 12.5* 13.9   HCT 34.5* 37.6 41.7   * 120* 146   GRANS 84* 83* 55   LYMPH 9* 7* 0*   EOS 1 1 0        Cardiac Enzymes No results found for: CPK, CK, CKMMB, CKMB, RCK3, CKMBT, CKNDX, CKND1, BUD, TROPT, TROIQ, NICOLE, TROPT, TNIPOC, BNP, BNPP     BNP No results found for: BNP, BNPP, XBNPT     Coagulation No results for input(s): PTP, INR, APTT, INREXT, INREXT in the last 72 hours.       Thyroid  Lab Results   Component Value Date/Time    TSH 1.49 03/11/2016 02:55 AM       No results found for: T4     Urinalysis Lab Results   Component Value Date/Time    Color YELLOW 07/21/2021 08:00 AM    Appearance CLEAR 07/21/2021 08:00 AM    Specific gravity 1.009 07/21/2021 08:00 AM    pH (UA) 5.5 07/21/2021 08:00 AM    Protein Negative 07/21/2021 08:00 AM    Glucose Negative 07/21/2021 08:00 AM    Ketone Negative 07/21/2021 08:00 AM    Bilirubin Negative 07/21/2021 08:00 AM    Urobilinogen 0.2 07/21/2021 08:00 AM    Nitrites Negative 07/21/2021 08:00 AM    Leukocyte Esterase Negative 07/21/2021 08:00 AM          June 2013  Hep C virus Ab Interp. NEGATIVE POSITIVEAbnormal         Culture data during this hospitalization. All Micro Results     Procedure Component Value Units Date/Time    CULTURE, BLOOD [[id number]] Collected: 07/21/21 0704    Order Status: Completed Specimen: Blood Updated: 07/24/21 0705     Special Requests: NO SPECIAL REQUESTS        Culture result: NO GROWTH 3 DAYS       CULTURE, BLOOD [[id number]] Collected: 07/21/21 0730    Order Status: Completed Specimen: Blood Updated: 07/24/21 0705     Special Requests: NO SPECIAL REQUESTS        Culture result: NO GROWTH 3 DAYS       RESPIRATORY VIRUS PANEL W/COVID-19, PCR [[id number]]  (Abnormal) Collected: 07/21/21 2230    Order Status: Completed Specimen: Nasopharyngeal Updated: 07/22/21 1135     Adenovirus Not detected        Coronavirus 229E Not detected        Coronavirus HKU1 Not detected        Coronavirus CVNL63 Not detected        Coronavirus OC43 Not detected        SARS-CoV-2, PCR       NEGATIVE (Aniyah Lopez). TEST PERFORMED AT [email or web address]. Metapneumovirus Not detected        Rhinovirus and Enterovirus Not detected        Influenza A Not detected        Influenza B Not detected        Parainfluenza 1 Not detected        Parainfluenza 2 Not detected        Parainfluenza 3 Not detected        Parainfluenza virus 4 Not detected        RSV by PCR Not detected        B. parapertussis, PCR Not detected        Bordetella pertussis - PCR Not detected        Chlamydophila pneumoniae DNA, QL, PCR Not detected        Mycoplasma pneumoniae DNA, QL, PCR Not detected       COVID-19 RAPID TEST [[id number]] Collected: 07/21/21 2310    Order Status: Completed Specimen: Nasopharyngeal Updated: 07/21/21 2336     Specimen source Nasopharyngeal        COVID-19 rapid test Not detected        Comment: Rapid Abbott ID Now       Rapid NAAT:  The specimen is NEGATIVE for SARS-CoV-2, the novel coronavirus associated with COVID-19.        Negative results should be treated as presumptive and, if inconsistent with clinical signs and symptoms or necessary for patient management, should be tested with an alternative molecular assay. Negative results do not preclude SARS-CoV-2 infection and should not be used as the sole basis for patient management decisions. This test has been authorized by the FDA under an Emergency Use Authorization (EUA) for use by authorized laboratories. Fact sheet for Healthcare Providers: [employer]  Fact sheet for Patients: [employer]       Methodology: Isothermal Nucleic Acid Amplification         CULTURE, RESPIRATORY/SPUTUM/BRONCH [employer] [[id number]] Collected: 07/21/21 0915    Order Status: Canceled Specimen: Sputum from Tracheal Aspirate            ECHO 7/21/2021 Interpretation Summary  Result status: Final result ·   Echo study was technically difficulty and limited due to patient's condition. Image quality for this study was suboptimal. Contrast used: DEFINITY. · LV: Estimated LVEF is 15 - 20%. Normal cavity size and wall thickness. Severely reduced systolic function. Unable to assess diastolic function due to elevated heart rate. · RV: Moderately dilated right ventricle. Reduced systolic function. Assessment of RV function: TAPSE= 9mm. · TV: Normal valve structure and no stenosis. Tricuspid regurgitation is inadequate for estimation of right ventricular systolic pressure. · IVC: Mechanically ventilated; cannot use inferior caval vein diameter to estimate central venous pressure. Images report reviewed by me:  CT 7/21 (Most Recent)  Results from Hospital Encounter encounter on 07/21/21    CT HEAD WO CONT    Narrative  EXAM: CT head    INDICATION: Altered mental status. COMPARISON: None.     TECHNIQUE: Axial CT imaging of the head was performed without intravenous  contrast. Standard multiplanar coronal and sagittal reformatted images were  obtained and are included in interpretation. One or more dose reduction techniques were used on this CT: automated exposure  control, adjustment of the mAs and/or kVp according to patient size, and  iterative reconstruction techniques. The specific techniques used on this CT  exam have been documented in the patient's electronic medical record. Digital  Imaging and Communications in Medicine (DICOM) format image data are available  to nonaffiliated external healthcare facilities or entities on a secure, media  free, reciprocally searchable basis with patient authorization for at least a  12-month period after this study. _______________    FINDINGS:    BRAIN AND POSTERIOR FOSSA:  Brain    EXTRA-AXIAL SPACES AND MENINGES: There are no abnormal extra-axial fluid  collections. CALVARIUM: Intact. SINUSES: Right maxillary sinus mucosal thickening. OTHER: Fluid secretions within the posterior nasopharynx. _______________    Impression  No acute intracranial abnormality. Right maxillary sinus mucosal thickening. Fluid secretions within the posterior  nasopharynx. CXR reviewed by me:  XR 7/24 Results from Hospital Encounter encounter on 07/21/21    XR CHEST PORT    Narrative  EXAM: CHEST RADIOGRAPH    CLINICAL INDICATION/HISTORY: Pneumonia  > Additional: None    COMPARISON: 7/23/2021    TECHNIQUE: Portable frontal view of the chest    _______________    FINDINGS:    SUPPORT DEVICES: Endotracheal tube and NG/OG tube have been removed. HEART AND MEDIASTINUM: Heart size is normal.    LUNGS AND PLEURAL SPACES: Persistent patchy bilateral pulmonary opacities which  are similar to the prior exam. No new focal consolidation, effusion, or  pneumothorax. BONES AND SOFT TISSUES: Unremarkable.    _______________    Impression  Status post extubation with persistent patchy bilateral pulmonary opacities  which are similar to the prior exam. No new focal consolidation.            IMPRESSION:   · Acute respiratory failure with hypoxemia and hypercapnia  · ARDS  · Drug overdose  · Aspiration pneumonia  · Polysubstance abuse-cocaine, amphetamine  · Lactic acidosis  · Acute renal failure  · Alcohol abuse  · Hepatitis C infection  ·   ·   Patient Active Problem List   Diagnosis Code    Polysubstance abuse ([facility] F19.10    Drug overdose, multiple drugs T50.911A    Elevated LFTs R79.89    Overdose of heroin ([facility] T40.1X1A    EZEQUIEL (acute kidney injury) ([facility] N17.9    ATN (acute tubular necrosis) ([facility]) N17.0    Elevated troponin R77.8    Lactic acidosis E87.2    Encephalopathy G93.40    Acute respiratory failure with hypoxia and hypercapnia (HCC) J96.01, J96.02    Hypokalemia E87.6    ARDS (adult respiratory distress syndrome) ([facility] J80    Aspiration pneumonia (HCC) J69.0    Alcohol abuse F10.10    Hepatitis-C B19.20       · Code status: Full code      RECOMMENDATIONS:   Respiratory: S/p extubation yesterday. Chest x-ray reviewed-improved bilateral infiltrates on my review compared to last 2 days. Stable respirations; can wean off nasal cannula oxygen to maintain O2 sats greater than 91%. CVS: Stable blood pressure; avoid beta-blockers due to cocaine use. Echocardiogram - cardiomyopathy with EF 52-72%-[id number] versus alcoholic versus cocaine induced; cardiology on case. ID: Antibiotics-Zosyn-complete 7 days therapy. Blood cultures negative from admission; lactic acid-normalized; WBC improved. Rapid Covid test, and SARS-CoV-2 PCR negative. Respiratory viral panel negative as well. Hematology/Oncology: Monitor hemoglobin and platelets-stable. Platelets improving. Renal: Stable renal function; normal creatinine; discontinue Orellana catheter today. Calcium, potassium and phosphorus replaced. GI: Oral diet as tolerated. PPI prophylaxis. History of hepatitis C and cocaine with alcohol abuse: Stable LFTs-transaminases mildly elevated. Endocrine: Stable blood sugars. Neurology: CT head-nil acute on admission. Discontinue Precedex infusion. Mentation normal.  Toxicology: Urine drug screen positive for  cocaine and amphetamines; high suspicion for synthetic opiate-patient admits to taking street fentanyl; patient reports mild alcohol use. Serum acetaminophen and salicylate negative. IVF: Normal saline 75 mL/h  Nutrition: Nutrition consult for tube feeding. Prophylaxis: DVT Prophylaxis: Enoxaparin [monitor platelets]. GI Prophylaxis: Protonix. Restraints: none while on paralytic  Lines/Tubes: PIVs  ETT: 7/21-extubated 7/23  Orellana: 7/21-discontinue today  Right femoral central and arterial lines 7/21-removed 7/23    Discussed with patient and updated improved clinical condition. Okay to transfer out of ICU; no visitors except for parents. I would sign off-please call if needed. High complexity decision making was performed during the evaluation of this patient. Please note: Voice-recognition software may have been used to generate this report, which may have resulted in some phonetic-based errors in grammar and contents. Even though attempts were made to correct all the mistakes, some may have been missed, and remained in the body of the document.       Ludmila Dukes MD  7/24/2021

## 2021-07-24 NOTE — PROGRESS NOTES
SLP evaluation orders received. Unable to complete evaluation due to pt ADEOLA. Will continue to address when pt is available.      Thank you for this referral.    Glo Zepeda M.Ed., [id number] [facility]  Speech-Language Pathologist

## 2021-07-24 NOTE — PROGRESS NOTES
Hospitalist Progress Note    Patient: Shari Floor MRN: [id number]  CSN: [id number]    YOB: 1985  Age: 30 y.o. Sex: male    DOA: 7/21/2021 LOS:  LOS: 3 days                Assessment and Plan:    Principal Problem:    ARDS (adult respiratory distress syndrome) ([facility] (7/21/2021)    Active Problems:    Polysubstance abuse ([address] (6/15/2013)      Drug overdose, multiple drugs (6/15/2013)      EZEQUIEL (acute kidney injury) ([address] (3/10/2016)      Encephalopathy (3/10/2016)      Acute respiratory failure with hypoxia and hypercapnia ([address] (3/13/2016)      Aspiration pneumonia ([facility] (7/21/2021)      Alcohol abuse (7/21/2021)      Hepatitis-C (7/21/2021)        Resp -   Acute respiratory failure with hypoxia   Extubated 07/23/2021   ARDS and aspiration pneumonia with slow improvement  Albuterol and incentive spirometry        ID - Follow up blood and urine cx. ANTIBIOTICS zosyn. Rapid COVID 19 negative.     CVS -   Nuclear stress test Monday      Tachycardia: resolved  Cardiac enzymes with mild elevation in troponin  Echo with severely reduced LVF, EF of 15-20%  Cannot use betablocker secondary to cocaine use  Start on ACE-I/ARB BP permitting.     Heme/onc -   follow H&H, plts. INR. Are all stable       Renal -   EZEQUIEL Creatinine  Resolved to normal at . 6   Endocrine -  Follow FSG  Sliding scale for glucose control     Neuro/ Pain/ Sedation -   Stable mental status.     Unresponsiveness encephalopathy  CT head no acute process . Now much better       GI - NPO for now. protonix      Psych -   polysubstance abuse, and alcohol abuse   uds positive for of amphentermine, cocaine     Prophylaxis -   DVT: heparin, GI: protonix         Chief complaint:  Fawn Murillo a 30 y. o. male with history of for polysubstance abuse was found unresponsive.  He was intubated in ER due to severe hypoxia.       Subjective:    Complains of chest pain with coughing and some diarrhea and opiod withdrawal Review of systems:    General: No fevers or chills. Cardiovascular: No chest pain or pressure. No palpitations. Pulmonary: No shortness of breath. Gastrointestinal: No nausea, vomiting. Objective:    Vital signs/Intake and Output:  Visit Vitals  /74   Pulse 71   Temp 98.9 °F (37.2 °C)   Resp 18   Ht 5' 8\" (1.727 m)   Wt 81.3 kg (179 lb 3.7 oz)   SpO2 99%   BMI 27.25 kg/m²     Current Shift:  07/24 0701 - 07/24 1900  In: 744.5 [P.O.:60; I.V.:684.5]  Out: 225 [Urine:225]  Last three shifts:  07/22 1901 - 07/24 0700  In: 3943.4 [I.V.:3763.4]  Out: 7256 [Urine:4350]    Physical Exam:  General: NAD, AAOx3. Non-toxic. HEENT: NC/AT. PERRLA, EOMI.  MMM. Lungs: Nml inspection. CTA B/L. No wheezing, rales or rhonchi. Heart:  S1S2 RRR,  PMI mid 5th IC space. No M/RG. Abdomen: Soft, NT/ND.  BS+. No peritoneal signs. Extremities: No C/C/E. Psych:   Nml affect. Neurologic:  2-12 intact. Strength 5/5 throughout. Sensation symmetrical.          Labs: Results:       Chemistry Recent Labs     07/24/21  0445 07/23/21  1648 07/23/21  0520 07/22/21  0400 07/22/21  0400   GLU 75  --  89  --  103*     --  138  --  138   K 3.7 4.2 3.5   < > 4.2     --  105  --  108   CO2 26  --  26  --  22   BUN 15  --  13  --  21*   CREA 0.64  --  0.65  --  1.01   CA 8.4*  --  8.1*  --  8.5   AGAP 5  --  7  --  8   BUCR 23*  --  20  --  21*   AP 65  --  76  --  75   TP 5.1*  --  5.4*  --  5.6*   ALB 2.6*  --  2.6*  --  3.0*   GLOB 2.5  --  2.8  --  2.6   AGRAT 1.0  --  0.9  --  1.2    < > = values in this interval not displayed.       CBC w/Diff Recent Labs     07/24/21  0445 07/23/21  0520 07/22/21  0400   WBC 9.0 12.2 17.3*   RBC 3.84* 4.16* 4.64   HGB 11.5* 12.5* 13.9   HCT 34.5* 37.6 41.7   * 120* 146   GRANS 84* 83* 55   LYMPH 9* 7* 0*   EOS 1 1 0      Cardiac Enzymes Recent Labs     07/21/21  1950   *   CKND1 0.7      Coagulation No results for input(s): PTP, INR, APTT, INREXT in the last 72 hours. Lipid Panel No results found for: CHOL, CHOLPOCT, CHOLX, CHLST, CHOLV, 141701, HDL, HDLP, LDL, LDLC, DLDLP, 714675, VLDLC, VLDL, TGLX, TRIGL, TRIGP, TGLPOCT, CHHD, CHHDX   BNP No results for input(s): BNPP in the last 72 hours.    Liver Enzymes Recent Labs     07/24/21  0445   TP 5.1*   ALB 2.6*   AP 65      Thyroid Studies Lab Results   Component Value Date/Time    TSH 1.49 03/11/2016 02:55 AM        Procedures/imaging: see electronic medical records for all procedures/Xrays and details which were not copied into this note but were reviewed prior to creation of Plan

## 2021-07-24 NOTE — PROGRESS NOTES
Transfer of care patient report given to [employer], RN via telephone by Edilma Bond RN. Report included the following information SBAR, ED Summary, Intake/Output, MAR, Recent Results, Cardiac Rhythm NSR and Alarm Parameters . Patient transferred via wheelchair to room 359. Patient hooked up to tele-monitor, A&Ox4, VSS, and in care of [employer] RN.

## 2021-07-25 LAB
ALBUMIN SERPL-MCNC: 2.7 G/DL (ref 3.4–5)
ALBUMIN/GLOB SERPL: 1 {RATIO} (ref 0.8–1.7)
ALP SERPL-CCNC: 56 U/L (ref 45–117)
ALT SERPL-CCNC: 83 U/L (ref 16–61)
ANION GAP SERPL CALC-SCNC: 5 MMOL/L (ref 3–18)
AST SERPL-CCNC: 103 U/L (ref 10–38)
BASOPHILS # BLD: 0 K/UL (ref 0–0.1)
BASOPHILS NFR BLD: 0 % (ref 0–2)
BILIRUB SERPL-MCNC: 0.7 MG/DL (ref 0.2–1)
BUN SERPL-MCNC: 9 MG/DL (ref 7–18)
BUN/CREAT SERPL: 12 (ref 12–20)
CA-I SERPL-SCNC: 1.12 MMOL/L (ref 1.12–1.32)
CALCIUM SERPL-MCNC: 8.3 MG/DL (ref 8.5–10.1)
CHLORIDE SERPL-SCNC: 113 MMOL/L (ref 100–111)
CO2 SERPL-SCNC: 27 MMOL/L (ref 21–32)
CREAT SERPL-MCNC: 0.77 MG/DL (ref 0.6–1.3)
DIFFERENTIAL METHOD BLD: ABNORMAL
EOSINOPHIL # BLD: 0.1 K/UL (ref 0–0.4)
EOSINOPHIL NFR BLD: 1 % (ref 0–5)
ERYTHROCYTE [DISTWIDTH] IN BLOOD BY AUTOMATED COUNT: 11.5 % (ref 11.6–14.5)
GLOBULIN SER CALC-MCNC: 2.7 G/DL (ref 2–4)
GLUCOSE BLD STRIP.AUTO-MCNC: 110 MG/DL (ref 70–110)
GLUCOSE BLD STRIP.AUTO-MCNC: 111 MG/DL (ref 70–110)
GLUCOSE BLD STRIP.AUTO-MCNC: 122 MG/DL (ref 70–110)
GLUCOSE BLD STRIP.AUTO-MCNC: 130 MG/DL (ref 70–110)
GLUCOSE SERPL-MCNC: 109 MG/DL (ref 74–99)
HCT VFR BLD AUTO: 34.1 % (ref 36–48)
HGB BLD-MCNC: 11.6 G/DL (ref 13–16)
LYMPHOCYTES # BLD: 1 K/UL (ref 0.9–3.6)
LYMPHOCYTES NFR BLD: 17 % (ref 21–52)
MAGNESIUM SERPL-MCNC: 2 MG/DL (ref 1.6–2.6)
MCH RBC QN AUTO: 30.1 PG (ref 24–34)
MCHC RBC AUTO-ENTMCNC: 34 G/DL (ref 31–37)
MCV RBC AUTO: 88.3 FL (ref 74–97)
MONOCYTES # BLD: 0.4 K/UL (ref 0.05–1.2)
MONOCYTES NFR BLD: 8 % (ref 3–10)
NEUTS SEG # BLD: 4.1 K/UL (ref 1.8–8)
NEUTS SEG NFR BLD: 73 % (ref 40–73)
PHOSPHATE SERPL-MCNC: 2.8 MG/DL (ref 2.5–4.9)
PLATELET # BLD AUTO: 155 K/UL (ref 135–420)
PMV BLD AUTO: 10.4 FL (ref 9.2–11.8)
POTASSIUM SERPL-SCNC: 3.4 MMOL/L (ref 3.5–5.5)
PROT SERPL-MCNC: 5.4 G/DL (ref 6.4–8.2)
RBC # BLD AUTO: 3.86 M/UL (ref 4.35–5.65)
SODIUM SERPL-SCNC: 145 MMOL/L (ref 136–145)
WBC # BLD AUTO: 5.6 K/UL (ref 4.6–13.2)

## 2021-07-25 PROCEDURE — 74011250637 HC RX REV CODE- 250/637: Performed by: HOSPITALIST

## 2021-07-25 PROCEDURE — 84100 ASSAY OF PHOSPHORUS: CPT

## 2021-07-25 PROCEDURE — 74011250637 HC RX REV CODE- 250/637: Performed by: FAMILY MEDICINE

## 2021-07-25 PROCEDURE — 74011000250 HC RX REV CODE- 250: Performed by: INTERNAL MEDICINE

## 2021-07-25 PROCEDURE — 83735 ASSAY OF MAGNESIUM: CPT

## 2021-07-25 PROCEDURE — 74011000258 HC RX REV CODE- 258: Performed by: EMERGENCY MEDICINE

## 2021-07-25 PROCEDURE — 74011250636 HC RX REV CODE- 250/636: Performed by: EMERGENCY MEDICINE

## 2021-07-25 PROCEDURE — 85025 COMPLETE CBC W/AUTO DIFF WBC: CPT

## 2021-07-25 PROCEDURE — 36415 COLL VENOUS BLD VENIPUNCTURE: CPT

## 2021-07-25 PROCEDURE — 74011250636 HC RX REV CODE- 250/636: Performed by: HOSPITALIST

## 2021-07-25 PROCEDURE — 82330 ASSAY OF CALCIUM: CPT

## 2021-07-25 PROCEDURE — 65660000000 HC RM CCU STEPDOWN

## 2021-07-25 PROCEDURE — 80053 COMPREHEN METABOLIC PANEL: CPT

## 2021-07-25 PROCEDURE — 74011250637 HC RX REV CODE- 250/637: Performed by: INTERNAL MEDICINE

## 2021-07-25 PROCEDURE — 74011250636 HC RX REV CODE- 250/636: Performed by: INTERNAL MEDICINE

## 2021-07-25 PROCEDURE — C9113 INJ PANTOPRAZOLE SODIUM, VIA: HCPCS | Performed by: INTERNAL MEDICINE

## 2021-07-25 PROCEDURE — 99232 SBSQ HOSP IP/OBS MODERATE 35: CPT | Performed by: INTERNAL MEDICINE

## 2021-07-25 PROCEDURE — 94640 AIRWAY INHALATION TREATMENT: CPT

## 2021-07-25 RX ORDER — HYDROXYZINE HYDROCHLORIDE 10 MG/1
25 TABLET, FILM COATED ORAL
Status: DISCONTINUED | OUTPATIENT
Start: 2021-07-25 | End: 2021-07-27 | Stop reason: HOSPADM

## 2021-07-25 RX ORDER — LEVALBUTEROL 1.25 MG/.5ML
1.25 SOLUTION, CONCENTRATE RESPIRATORY (INHALATION)
Status: DISCONTINUED | OUTPATIENT
Start: 2021-07-25 | End: 2021-07-27

## 2021-07-25 RX ADMIN — LOPERAMIDE HYDROCHLORIDE 2 MG: 2 CAPSULE ORAL at 16:47

## 2021-07-25 RX ADMIN — HYDROXYZINE HYDROCHLORIDE 25 MG: 10 TABLET, FILM COATED ORAL at 11:57

## 2021-07-25 RX ADMIN — PIPERACILLIN AND TAZOBACTAM 4.5 G: 4; .5 INJECTION, POWDER, LYOPHILIZED, FOR SOLUTION INTRAVENOUS; PARENTERAL at 09:41

## 2021-07-25 RX ADMIN — Medication 10 ML: at 23:46

## 2021-07-25 RX ADMIN — LORAZEPAM 1 MG: 1 TABLET ORAL at 06:54

## 2021-07-25 RX ADMIN — PIPERACILLIN AND TAZOBACTAM 4.5 G: 4; .5 INJECTION, POWDER, LYOPHILIZED, FOR SOLUTION INTRAVENOUS; PARENTERAL at 16:40

## 2021-07-25 RX ADMIN — ALBUTEROL SULFATE 2.5 MG: 2.5 SOLUTION RESPIRATORY (INHALATION) at 02:59

## 2021-07-25 RX ADMIN — PIPERACILLIN AND TAZOBACTAM 4.5 G: 4; .5 INJECTION, POWDER, LYOPHILIZED, FOR SOLUTION INTRAVENOUS; PARENTERAL at 23:44

## 2021-07-25 RX ADMIN — LOPERAMIDE HYDROCHLORIDE 2 MG: 2 CAPSULE ORAL at 02:50

## 2021-07-25 RX ADMIN — LEVALBUTEROL 1.25 MG: 1.25 SOLUTION, CONCENTRATE RESPIRATORY (INHALATION) at 20:32

## 2021-07-25 RX ADMIN — SODIUM CHLORIDE 40 MG: 9 INJECTION, SOLUTION INTRAMUSCULAR; INTRAVENOUS; SUBCUTANEOUS at 09:13

## 2021-07-25 RX ADMIN — ENOXAPARIN SODIUM 40 MG: 40 INJECTION SUBCUTANEOUS at 09:13

## 2021-07-25 RX ADMIN — LORAZEPAM 1 MG: 2 INJECTION INTRAMUSCULAR at 16:47

## 2021-07-25 RX ADMIN — PIPERACILLIN AND TAZOBACTAM 4.5 G: 4; .5 INJECTION, POWDER, LYOPHILIZED, FOR SOLUTION INTRAVENOUS; PARENTERAL at 02:47

## 2021-07-25 RX ADMIN — Medication 5 MG: at 23:44

## 2021-07-25 RX ADMIN — SODIUM CHLORIDE 75 ML/HR: 900 INJECTION, SOLUTION INTRAVENOUS at 09:13

## 2021-07-25 RX ADMIN — LEVALBUTEROL 1.25 MG: 1.25 SOLUTION, CONCENTRATE RESPIRATORY (INHALATION) at 13:43

## 2021-07-25 NOTE — PROGRESS NOTES
Patient refused HHN tx. No respiratory distress is noted at this time. Patient is stable and resting comfortably.

## 2021-07-25 NOTE — PROGRESS NOTES
Hospitalist Progress Note    Patient: Fern Timmons MRN: [id number]  CSN: [id number]    YOB: 1985  Age: 30 y.o. Sex: male    DOA: 7/21/2021 LOS:  LOS: 4 days                Assessment and Plan:    Principal Problem:    ARDS (adult respiratory distress syndrome) ([facility] (7/21/2021)    Active Problems:    Polysubstance abuse ([facility] (6/15/2013)      Drug overdose, multiple drugs (6/15/2013)      EZEQUIEL (acute kidney injury) ([facility] (3/10/2016)      Encephalopathy (3/10/2016)      Acute respiratory failure with hypoxia and hypercapnia ([facility] (3/13/2016)      Aspiration pneumonia ([facility] (7/21/2021)      Alcohol abuse (7/21/2021)      Hepatitis-C (7/21/2021)        Resp -   Acute respiratory failure with hypoxia   Extubated 07/23/2021   ARDS and aspiration pneumonia with slow improvement  Albuterol and incentive spirometry        ID - Follow up blood and urine cx. ANTIBIOTICS zosyn. Rapid COVID 19 negative.     [facility] -   Nuclear stress test Monday       Tachycardia: resolved  Cardiac enzymes with mild elevation in troponin  Echo with severely reduced LVF, EF of 15-20%  Cannot use betablocker secondary to cocaine use  Start on ACE-I/ARB BP permitting. Patient wants to start his vistaril and suboxone but worried about his heart so will write for vistaril only       Heme/onc -   follow H&H, plts. INR. Are all stable       Renal -   EZEQUIEL Creatinine  Resolved to normal  Endocrine -  Follow FSG  Sliding scale for glucose control     Neuro/ Pain/ Sedation -   Stable mental status.     Unresponsiveness encephalopathy  CT head no acute process . Now much better       GI - NPO for now. protonix      Psych -   polysubstance abuse, and alcohol abuse   uds positive for of amphentermine, cocaine     Prophylaxis -   DVT: heparin, GI: protonix    Will do xopenex for the breathing      Chief complaint:  Avila De Leon a 30 y. o. male with history of for polysubstance abuse was found unresponsive.  He was intubated in ER due to severe hypoxia. Subjective:    Having a lot of withdrawal symptoms. Asking for his suboxone. Palpitations with the ablbuterol but it does help the breathing. Review of systems:    General: No fevers or chills. Cardiovascular: No chest pain or pressure. No palpitations. Pulmonary: No shortness of breath. Gastrointestinal: No nausea, vomiting. Objective:    Vital signs/Intake and Output:  Visit Vitals  /67   Pulse 90   Temp 98.7 °F (37.1 °C)   Resp 16   Ht 5' 8\" (1.727 m)   Wt 81.3 kg (179 lb 3.7 oz)   SpO2 100%   BMI 27.25 kg/m²     Current Shift:  07/25 0701 - 07/25 1900  In: -   Out: 300 [Urine:300]  Last three shifts:  07/23 1901 - 07/25 0700  In: 2047.8 [P.O.:120; I.V.:1927.8]  Out: 1425 [Urine:1425]    Physical Exam:  General: NAD, AAOx3. Non-toxic. HEENT: NC/AT. PERRLA, EOMI.  MMM. Lungs: Nml inspection. CTA B/L. No wheezing, rales or rhonchi. Heart:  S1S2 RRR,  PMI mid 5th IC space. No M/RG. Abdomen: Soft, NT/ND.  BS+. No peritoneal signs. Extremities: No C/C/E. Psych:   Nml affect. Neurologic:  2-12 intact. Strength 5/5 throughout. Sensation symmetrical.          Labs: Results:       Chemistry Recent Labs     07/25/21  0345 07/24/21  0445 07/23/21  1648 07/23/21  0520 07/23/21  0520   * 75  --   --  89    142  --   --  138   K 3.4* 3.7 4.2   < > 3.5   * 111  --   --  105   CO2 27 26  --   --  26   BUN 9 15  --   --  13   CREA 0.77 0.64  --   --  0.65   CA 8.3* 8.4*  --   --  8.1*   AGAP 5 5  --   --  7   BUCR 12 23*  --   --  20   AP 56 65  --   --  76   TP 5.4* 5.1*  --   --  5.4*   ALB 2.7* 2.6*  --   --  2.6*   GLOB 2.7 2.5  --   --  2.8   AGRAT 1.0 1.0  --   --  0.9    < > = values in this interval not displayed.       CBC w/Diff Recent Labs     07/25/21  0345 07/24/21  0445 07/23/21  0520   WBC 5.6 9.0 12.2   RBC 3.86* 3.84* 4.16*   HGB 11.6* 11.5* 12.5*   HCT 34.1* 34.5* 37.6    134* 120*   GRANS 73 84* 83*   LYMPH 17* 9* 7*   EOS 1 1 1      Cardiac Enzymes No results for input(s): CPK, CKND1, BUD in the last 72 hours. No lab exists for component: CKRMB, TROIP   Coagulation No results for input(s): PTP, INR, APTT, INREXT in the last 72 hours. Lipid Panel No results found for: CHOL, CHOLPOCT, CHOLX, CHLST, CHOLV, 211763, HDL, HDLP, LDL, LDLC, DLDLP, 615599, VLDLC, VLDL, TGLX, TRIGL, TRIGP, TGLPOCT, CHHD, CHHDX   BNP No results for input(s): BNPP in the last 72 hours.    Liver Enzymes Recent Labs     07/25/21  0345   TP 5.4*   ALB 2.7*   AP 56      Thyroid Studies Lab Results   Component Value Date/Time    TSH 1.49 03/11/2016 02:55 AM        Procedures/imaging: see electronic medical records for all procedures/Xrays and details which were not copied into this note but were reviewed prior to creation of Plan

## 2021-07-25 NOTE — PROGRESS NOTES
Patient refused HHN treatment. Patient is stable and resting comfortably. No respiratory distress is noted at this time.

## 2021-07-25 NOTE — PROGRESS NOTES
Cardiology Progress Note      7/25/2021 3:24 PM    Admit Date: 7/21/2021    Admit Diagnosis: ARDS (adult respiratory distress syndrome) (HCC) [J80]      Subjective:     Fern Jurist has mulriple issues. .    Visit Vitals  /68   Pulse 91   Temp 98.7 °F (37.1 °C)   Resp 15   Ht 5' 8\" (1.727 m)   Wt 81.3 kg (179 lb 3.7 oz)   SpO2 97%   BMI 27.25 kg/m²     Current Facility-Administered Medications   Medication Dose Route Frequency    hydrOXYzine HCL (ATARAX) tablet 25 mg  25 mg Oral TID PRN    levalbuterol (XOPENEX) nebulizer soln 1.25 mg/0.5 ml  1.25 mg Nebulization Q4H RT    piperacillin-tazobactam (ZOSYN) 4.5 g in 0.9% sodium chloride (MBP/ADV) 100 mL MBP  4.5 g IntraVENous Q6H    loperamide (IMODIUM) capsule 2 mg  2 mg Oral Q6H PRN    melatonin (rapid dissolve) tablet 5 mg  5 mg Oral QHS    fentaNYL citrate (PF) injection 50 mcg  50 mcg IntraVENous Q4H PRN    enoxaparin (LOVENOX) injection 40 mg  40 mg SubCUTAneous Q24H    sodium chloride (NS) flush 5-10 mL  5-10 mL IntraVENous PRN    pantoprazole (PROTONIX) 40 mg in 0.9% sodium chloride 10 mL injection  40 mg IntraVENous Q24H    0.9% sodium chloride infusion  75 mL/hr IntraVENous CONTINUOUS    insulin lispro (HUMALOG) injection   SubCUTAneous Q6H    glucose chewable tablet 16 g  4 Tablet Oral PRN    glucagon (GLUCAGEN) injection 1 mg  1 mg IntraMUSCular PRN    dextrose (D50W) injection syrg 12.5-25 g  25-50 mL IntraVENous PRN    LORazepam (ATIVAN) injection 4 mg  4 mg IntraVENous Q4H PRN    sodium chloride (NS) flush 5-40 mL  5-40 mL IntraVENous Q8H    sodium chloride (NS) flush 5-40 mL  5-40 mL IntraVENous PRN    acetaminophen (TYLENOL) tablet 650 mg  650 mg Oral Q6H PRN    Or    acetaminophen (TYLENOL) suppository 650 mg  650 mg Rectal Q6H PRN    polyethylene glycol (MIRALAX) packet 17 g  17 g Oral DAILY PRN    ondansetron (ZOFRAN ODT) tablet 4 mg  4 mg Oral Q8H PRN    Or    ondansetron (ZOFRAN) injection 4 mg  4 mg IntraVENous Q6H PRN    sodium chloride (NS) flush 5-40 mL  5-40 mL IntraVENous PRN    LORazepam (ATIVAN) tablet 1 mg  1 mg Oral Q1H PRN    Or    LORazepam (ATIVAN) injection 1 mg  1 mg IntraVENous Q1H PRN    LORazepam (ATIVAN) tablet 2 mg  2 mg Oral Q1H PRN    Or    LORazepam (ATIVAN) injection 2 mg  2 mg IntraVENous Q1H PRN    LORazepam (ATIVAN) injection 3 mg  3 mg IntraVENous Q15MIN PRN         Objective:      Physical Exam:  General:         As above    HEENT:           NC, Atraumatic.   anicteric sclerae. Lungs:            CTA Bilaterally. No Wheezing/Rhonchi/Rales. Heart:              S1 S2,  No murmur, No Rubs, No Gallops  Abdomen:      Soft, Non distended, Non tender.  +Bowel sounds,        Data Review:   Labs:    Recent Results (from the past 24 hour(s))   GLUCOSE, POC    Collection Time: 07/24/21  4:29 PM   Result Value Ref Range    Glucose (POC) 112 (H) 70 - 110 mg/dL   GLUCOSE, POC    Collection Time: 07/24/21 11:59 PM   Result Value Ref Range    Glucose (POC) 110 70 - 110 mg/dL   MAGNESIUM    Collection Time: 07/25/21  3:45 AM   Result Value Ref Range    Magnesium 2.0 1.6 - 2.6 mg/dL   PHOSPHORUS    Collection Time: 07/25/21  3:45 AM   Result Value Ref Range    Phosphorus 2.8 2.5 - 4.9 MG/DL   CALCIUM, IONIZED    Collection Time: 07/25/21  3:45 AM   Result Value Ref Range    Ionized Calcium 1.12 1.12 - 1.32 MMOL/L   CBC WITH AUTOMATED DIFF    Collection Time: 07/25/21  3:45 AM   Result Value Ref Range    WBC 5.6 4.6 - 13.2 K/uL    RBC 3.86 (L) 4.35 - 5.65 M/uL    HGB 11.6 (L) 13.0 - 16.0 g/dL    HCT 34.1 (L) 36.0 - 48.0 %    MCV 88.3 74.0 - 97.0 FL    MCH 30.1 24.0 - 34.0 PG    MCHC 34.0 31.0 - 37.0 g/dL    RDW 11.5 (L) 11.6 - 14.5 %    PLATELET 287 238 - 418 K/uL    MPV 10.4 9.2 - 11.8 FL    NEUTROPHILS 73 40 - 73 %    LYMPHOCYTES 17 (L) 21 - 52 %    MONOCYTES 8 3 - 10 %    EOSINOPHILS 1 0 - 5 %    BASOPHILS 0 0 - 2 %    ABS. NEUTROPHILS 4.1 1.8 - 8.0 K/UL    ABS.  LYMPHOCYTES 1.0 0.9 - 3.6 K/UL ABS. MONOCYTES 0.4 0.05 - 1.2 K/UL    ABS. EOSINOPHILS 0.1 0.0 - 0.4 K/UL    ABS. BASOPHILS 0.0 0.0 - 0.1 K/UL    DF AUTOMATED     METABOLIC PANEL, COMPREHENSIVE    Collection Time: 07/25/21  3:45 AM   Result Value Ref Range    Sodium 145 136 - 145 mmol/L    Potassium 3.4 (L) 3.5 - 5.5 mmol/L    Chloride 113 (H) 100 - 111 mmol/L    CO2 27 21 - 32 mmol/L    Anion gap 5 3.0 - 18 mmol/L    Glucose 109 (H) 74 - 99 mg/dL    BUN 9 7.0 - 18 MG/DL    Creatinine 0.77 0.6 - 1.3 MG/DL    BUN/Creatinine ratio 12 12 - 20      GFR est AA >60 >60 ml/min/1.73m2    GFR est non-AA >60 >60 ml/min/1.73m2    Calcium 8.3 (L) 8.5 - 10.1 MG/DL    Bilirubin, total 0.7 0.2 - 1.0 MG/DL    ALT (SGPT) 83 (H) 16 - 61 U/L    AST (SGOT) 103 (H) 10 - 38 U/L    Alk. phosphatase 56 45 - 117 U/L    Protein, total 5.4 (L) 6.4 - 8.2 g/dL    Albumin 2.7 (L) 3.4 - 5.0 g/dL    Globulin 2.7 2.0 - 4.0 g/dL    A-G Ratio 1.0 0.8 - 1.7     GLUCOSE, POC    Collection Time: 07/25/21  6:07 AM   Result Value Ref Range    Glucose (POC) 122 (H) 70 - 110 mg/dL   GLUCOSE, POC    Collection Time: 07/25/21 12:16 PM   Result Value Ref Range    Glucose (POC) 130 (H) 70 - 110 mg/dL       Telemetry: normal sinus rhythm      Assessment:     Principal Problem:    ARDS (adult respiratory distress syndrome) ([facility] (7/21/2021)    Active Problems:    Polysubstance abuse ([facility] (6/15/2013)      Drug overdose, multiple drugs (6/15/2013)      EZEQUIEL (acute kidney injury) ([facility] (3/10/2016)      Encephalopathy (3/10/2016)      Acute respiratory failure with hypoxia and hypercapnia (HCC) (3/13/2016)      Aspiration pneumonia ([facility] (7/21/2021)      Alcohol abuse (7/21/2021)      Hepatitis-C (7/21/2021)        Plan: For stress test tomorrow. Cardiomyopathy unclear etiol.       Johan Soliman MD

## 2021-07-25 NOTE — PROGRESS NOTES
Pharmacy Note: Zosyn    Pt was on extended infusion of Zosyn while in the intensive care unit. Prior order:  Zosyn 4.5 grams IV q8h (over 240 minutes). Now that patient has been transferred to , Zosyn has been adjusted to standard intermittent dosing. Dose adjusted to:  Zosyn 4.5 grams IV q6h (infuse over 30 minutes).   Scr = 0.77   CrCl = 130.8 ml/min      Mila Calderon, PharmD   [phone]

## 2021-07-25 NOTE — PROGRESS NOTES
1800:   stated due to patient being able to eat, no longer intubated, and not a diabetic it was okay for this nurse to DC insulin and achs orders

## 2021-07-25 NOTE — PROGRESS NOTES
Problem: Patient Education: Go to Patient Education Activity  Goal: Patient/Family Education  Outcome: Progressing Towards Goal     Problem: Patient Education: Go to Patient Education Activity  Goal: Patient/Family Education  Outcome: Progressing Towards Goal     Problem: Pressure Injury - Risk of  Goal: *Prevention of pressure injury  Description: Document Kamron Scale and appropriate interventions in the flowsheet.   Outcome: Progressing Towards Goal  Note: Pressure Injury Interventions:  Sensory Interventions: Assess changes in LOC    Moisture Interventions: Absorbent underpads    Activity Interventions: Pressure redistribution bed/mattress(bed type), PT/OT evaluation    Mobility Interventions: Pressure redistribution bed/mattress (bed type), HOB 30 degrees or less    Nutrition Interventions: Document food/fluid/supplement intake    Friction and Shear Interventions: HOB 30 degrees or less                Problem: Patient Education: Go to Patient Education Activity  Goal: Patient/Family Education  Outcome: Progressing Towards Goal     Problem: Non-Violent Restraints  Goal: Removal from restraints as soon as assessed to be safe  Outcome: Progressing Towards Goal  Goal: No harm/injury to patient while restraints in use  Outcome: Progressing Towards Goal  Goal: Patient's dignity will be maintained  Outcome: Progressing Towards Goal  Goal: Patient Interventions  Outcome: Progressing Towards Goal     Problem: Nutrition Deficit  Goal: *Optimize nutritional status  Outcome: Progressing Towards Goal

## 2021-07-25 NOTE — PROGRESS NOTES
On-call SLP acknowledging eval and tx order. After review of EMR and d/w RN Pt tolerating current diet without incident. Accordingly, stat evaluation not indicated this day. Primary SLP will follow up next business day.      Thank you for this referral.     Yoselin Feliz M.Ed., [place] [id number] Pathologist

## 2021-07-26 ENCOUNTER — APPOINTMENT (OUTPATIENT)
Dept: NUCLEAR MEDICINE | Age: 36
DRG: 917 | End: 2021-07-26
Attending: INTERNAL MEDICINE
Payer: COMMERCIAL

## 2021-07-26 ENCOUNTER — APPOINTMENT (OUTPATIENT)
Dept: NON INVASIVE DIAGNOSTICS | Age: 36
DRG: 917 | End: 2021-07-26
Attending: INTERNAL MEDICINE
Payer: COMMERCIAL

## 2021-07-26 LAB
ALBUMIN SERPL-MCNC: 2.6 G/DL (ref 3.4–5)
ALBUMIN/GLOB SERPL: 1 {RATIO} (ref 0.8–1.7)
ALP SERPL-CCNC: 47 U/L (ref 45–117)
ALT SERPL-CCNC: 61 U/L (ref 16–61)
ANION GAP SERPL CALC-SCNC: 6 MMOL/L (ref 3–18)
AST SERPL-CCNC: 53 U/L (ref 10–38)
BASOPHILS # BLD: 0 K/UL (ref 0–0.1)
BASOPHILS NFR BLD: 0 % (ref 0–2)
BILIRUB SERPL-MCNC: 0.4 MG/DL (ref 0.2–1)
BUN SERPL-MCNC: 8 MG/DL (ref 7–18)
BUN/CREAT SERPL: 10 (ref 12–20)
CA-I SERPL-SCNC: 1.09 MMOL/L (ref 1.12–1.32)
CALCIUM SERPL-MCNC: 8.2 MG/DL (ref 8.5–10.1)
CHLORIDE SERPL-SCNC: 114 MMOL/L (ref 100–111)
CO2 SERPL-SCNC: 26 MMOL/L (ref 21–32)
CREAT SERPL-MCNC: 0.84 MG/DL (ref 0.6–1.3)
DIFFERENTIAL METHOD BLD: ABNORMAL
EOSINOPHIL # BLD: 0.1 K/UL (ref 0–0.4)
EOSINOPHIL NFR BLD: 3 % (ref 0–5)
ERYTHROCYTE [DISTWIDTH] IN BLOOD BY AUTOMATED COUNT: 11.6 % (ref 11.6–14.5)
GLOBULIN SER CALC-MCNC: 2.6 G/DL (ref 2–4)
GLUCOSE SERPL-MCNC: 100 MG/DL (ref 74–99)
HCT VFR BLD AUTO: 33.2 % (ref 36–48)
HGB BLD-MCNC: 11.1 G/DL (ref 13–16)
LYMPHOCYTES # BLD: 1.2 K/UL (ref 0.9–3.6)
LYMPHOCYTES NFR BLD: 24 % (ref 21–52)
MAGNESIUM SERPL-MCNC: 1.8 MG/DL (ref 1.6–2.6)
MCH RBC QN AUTO: 29.6 PG (ref 24–34)
MCHC RBC AUTO-ENTMCNC: 33.4 G/DL (ref 31–37)
MCV RBC AUTO: 88.5 FL (ref 74–97)
MONOCYTES # BLD: 0.5 K/UL (ref 0.05–1.2)
MONOCYTES NFR BLD: 9 % (ref 3–10)
NEUTS SEG # BLD: 3.2 K/UL (ref 1.8–8)
NEUTS SEG NFR BLD: 62 % (ref 40–73)
PHOSPHATE SERPL-MCNC: 3.7 MG/DL (ref 2.5–4.9)
PLATELET # BLD AUTO: 162 K/UL (ref 135–420)
PMV BLD AUTO: 10.4 FL (ref 9.2–11.8)
POTASSIUM SERPL-SCNC: 3.4 MMOL/L (ref 3.5–5.5)
PROT SERPL-MCNC: 5.2 G/DL (ref 6.4–8.2)
RBC # BLD AUTO: 3.75 M/UL (ref 4.35–5.65)
SODIUM SERPL-SCNC: 146 MMOL/L (ref 136–145)
STRESS BASELINE HR: 60 BPM
STRESS ESTIMATED WORKLOAD: 1 METS
STRESS EXERCISE DUR MIN: NORMAL
STRESS PEAK DIAS BP: 90 MMHG
STRESS PEAK SYS BP: 157 MMHG
STRESS PERCENT HR ACHIEVED: 62 %
STRESS POST PEAK HR: 116 BPM
STRESS RATE PRESSURE PRODUCT: NORMAL BPM*MMHG
STRESS ST DEPRESSION: 0 MM
STRESS ST ELEVATION: 0 MM
STRESS TARGET HR: 186 BPM
WBC # BLD AUTO: 5.2 K/UL (ref 4.6–13.2)

## 2021-07-26 PROCEDURE — 85025 COMPLETE CBC W/AUTO DIFF WBC: CPT

## 2021-07-26 PROCEDURE — 74011250636 HC RX REV CODE- 250/636: Performed by: INTERNAL MEDICINE

## 2021-07-26 PROCEDURE — 74011250637 HC RX REV CODE- 250/637: Performed by: HOSPITALIST

## 2021-07-26 PROCEDURE — 74011000250 HC RX REV CODE- 250: Performed by: INTERNAL MEDICINE

## 2021-07-26 PROCEDURE — 65660000000 HC RM CCU STEPDOWN

## 2021-07-26 PROCEDURE — 92526 ORAL FUNCTION THERAPY: CPT

## 2021-07-26 PROCEDURE — 84100 ASSAY OF PHOSPHORUS: CPT

## 2021-07-26 PROCEDURE — 36415 COLL VENOUS BLD VENIPUNCTURE: CPT

## 2021-07-26 PROCEDURE — 74011250636 HC RX REV CODE- 250/636: Performed by: EMERGENCY MEDICINE

## 2021-07-26 PROCEDURE — 77010033678 HC OXYGEN DAILY

## 2021-07-26 PROCEDURE — 74011000250 HC RX REV CODE- 250: Performed by: HOSPITALIST

## 2021-07-26 PROCEDURE — C9113 INJ PANTOPRAZOLE SODIUM, VIA: HCPCS | Performed by: INTERNAL MEDICINE

## 2021-07-26 PROCEDURE — 80053 COMPREHEN METABOLIC PANEL: CPT

## 2021-07-26 PROCEDURE — 83735 ASSAY OF MAGNESIUM: CPT

## 2021-07-26 PROCEDURE — 82330 ASSAY OF CALCIUM: CPT

## 2021-07-26 PROCEDURE — 92610 EVALUATE SWALLOWING FUNCTION: CPT

## 2021-07-26 PROCEDURE — 74011250636 HC RX REV CODE- 250/636: Performed by: HOSPITALIST

## 2021-07-26 PROCEDURE — 74011250637 HC RX REV CODE- 250/637: Performed by: FAMILY MEDICINE

## 2021-07-26 PROCEDURE — 94640 AIRWAY INHALATION TREATMENT: CPT

## 2021-07-26 PROCEDURE — 74011000258 HC RX REV CODE- 258: Performed by: EMERGENCY MEDICINE

## 2021-07-26 PROCEDURE — 93017 CV STRESS TEST TRACING ONLY: CPT

## 2021-07-26 RX ORDER — PANTOPRAZOLE SODIUM 40 MG/1
40 TABLET, DELAYED RELEASE ORAL
Status: DISCONTINUED | OUTPATIENT
Start: 2021-07-27 | End: 2021-07-27 | Stop reason: HOSPADM

## 2021-07-26 RX ORDER — SODIUM CHLORIDE 450 MG/100ML
75 INJECTION, SOLUTION INTRAVENOUS CONTINUOUS
Status: DISCONTINUED | OUTPATIENT
Start: 2021-07-26 | End: 2021-07-27 | Stop reason: HOSPADM

## 2021-07-26 RX ADMIN — Medication 10 ML: at 05:06

## 2021-07-26 RX ADMIN — PIPERACILLIN AND TAZOBACTAM 4.5 G: 4; .5 INJECTION, POWDER, LYOPHILIZED, FOR SOLUTION INTRAVENOUS; PARENTERAL at 09:42

## 2021-07-26 RX ADMIN — SODIUM CHLORIDE 75 ML/HR: 450 INJECTION, SOLUTION INTRAVENOUS at 20:18

## 2021-07-26 RX ADMIN — ENOXAPARIN SODIUM 40 MG: 40 INJECTION SUBCUTANEOUS at 09:42

## 2021-07-26 RX ADMIN — LORAZEPAM 1 MG: 2 INJECTION INTRAMUSCULAR at 16:56

## 2021-07-26 RX ADMIN — Medication 10 ML: at 13:04

## 2021-07-26 RX ADMIN — REGADENOSON 0.4 MG: 0.08 INJECTION, SOLUTION INTRAVENOUS at 12:05

## 2021-07-26 RX ADMIN — LORAZEPAM 1 MG: 1 TABLET ORAL at 06:42

## 2021-07-26 RX ADMIN — PIPERACILLIN AND TAZOBACTAM 4.5 G: 4; .5 INJECTION, POWDER, LYOPHILIZED, FOR SOLUTION INTRAVENOUS; PARENTERAL at 22:13

## 2021-07-26 RX ADMIN — LEVALBUTEROL 1.25 MG: 1.25 SOLUTION, CONCENTRATE RESPIRATORY (INHALATION) at 07:52

## 2021-07-26 RX ADMIN — PIPERACILLIN AND TAZOBACTAM 4.5 G: 4; .5 INJECTION, POWDER, LYOPHILIZED, FOR SOLUTION INTRAVENOUS; PARENTERAL at 05:06

## 2021-07-26 RX ADMIN — LORAZEPAM 1 MG: 2 INJECTION INTRAMUSCULAR at 13:04

## 2021-07-26 RX ADMIN — Medication 10 ML: at 22:17

## 2021-07-26 RX ADMIN — SODIUM CHLORIDE 40 MG: 9 INJECTION, SOLUTION INTRAMUSCULAR; INTRAVENOUS; SUBCUTANEOUS at 08:10

## 2021-07-26 RX ADMIN — LEVALBUTEROL 1.25 MG: 1.25 SOLUTION, CONCENTRATE RESPIRATORY (INHALATION) at 20:48

## 2021-07-26 RX ADMIN — PIPERACILLIN AND TAZOBACTAM 4.5 G: 4; .5 INJECTION, POWDER, LYOPHILIZED, FOR SOLUTION INTRAVENOUS; PARENTERAL at 16:21

## 2021-07-26 RX ADMIN — SODIUM CHLORIDE 75 ML/HR: 900 INJECTION, SOLUTION INTRAVENOUS at 08:10

## 2021-07-26 RX ADMIN — LORAZEPAM 1 MG: 1 TABLET ORAL at 20:18

## 2021-07-26 RX ADMIN — Medication 5 MG: at 22:13

## 2021-07-26 NOTE — PROGRESS NOTES
[phone]-cm contacted [facility] spoke with  [facility] informed cm that at this time program only taking primary medicaid patients, but did register patient in their system which automatic registered pt to [facility] which is their sister facility. [facility]- no beds available at this time, cm updated patient agreeable for cm to reach out to [employer], [employer] and [employer]. Chart reviewed and spoke with pt via phone conversation to discuss d/c plan after cm introduced self and explained cm role as d/c planner,pt interested in going to [facility] In[facility] detox treatment in [facility]  When discharged and would like cm assistance, after conversation cm did update  and will send referral to [address] [place] [address].

## 2021-07-26 NOTE — PROGRESS NOTES
Clinical Pharmacy Note: IV to PO Automatic Conversion    Patient Name: Leela Murphy   YOB: 1985   Medical Record Number: [id number]   Date of Admission: 7/21/2021    Daily Progress Note: 7/26/2021 2:32 PM     Please note the following medication(s) (protonix) has/have been changed from IV to PO based on the following critiera:    Patient is taking scheduled oral medications  Patient is tolerating tube feeds at goal rate or an NPO (except for meds), full liquid, soft or regular diet      Current Diet Orders  Active Orders   Diet    ADULT DIET Regular        New Order:  protonix 40 mg daily before breakfast.    This IV to PO conversion is based on the [facility] [employer] approved automatic conversion policy for eligible patients. Please call with questions.     Krunal Chavez [facility]  Clinical Pharmacist  [phone]

## 2021-07-26 NOTE — PROGRESS NOTES
Cardiology Progress Note        Patient: Shari [facility]        Sex: male          DOA: 7/21/2021  YOB: 1985      Age:  30 y.o.        LOS:  LOS: 5 days   Assessment/Plan     Principal Problem:    ARDS (adult respiratory distress syndrome) ([address] (7/21/2021)    Active Problems:    Polysubstance abuse ([address] (6/15/2013)      Drug overdose, multiple drugs (6/15/2013)      EZEQUIEL (acute kidney injury) ([address] (3/10/2016)      Encephalopathy (3/10/2016)      Acute respiratory failure with hypoxia and hypercapnia ([address] (3/13/2016)      Aspiration pneumonia ([address] (7/21/2021)      Alcohol abuse (7/21/2021)      Hepatitis-C (7/21/2021)        Plan:    Jeanenne Fossa nuclear stress test is abnormal but have shown improved EF to 61%    I have recommended cardiac catheterization possible PCI. Discussed with patient and mother and girlfriend. Patient want to think about  I will get limited echocardiogram tomorrow to reassess EF on echocardiogram.                    Subjective:    cc:  Cardiomyopathy        REVIEW OF SYSTEMS:     General: No fevers or chills. Cardiovascular: No chest pain or pressure. No palpitations. No ankle swelling  Pulmonary: No SOB, orthopnea, PND  Gastrointestinal: No nausea, vomiting or diarrhea      Objective:      Visit Vitals  BP (!) 148/87 (BP 1 Location: Left upper arm, BP Patient Position: At rest)   Pulse 78   Temp 98.7 °F (37.1 °C)   Resp 18   Ht 5' 8\" (1.727 m)   Wt 82.8 kg (182 lb 8 oz)   SpO2 99%   BMI 27.75 kg/m²     Body mass index is 27.75 kg/m². Physical Exam:  General Appearance: Comfortable, not using accessory muscles of respiration. NECK: No JVD, no thyroidomeglay. LUNGS: Clear bilaterally. HEART: S1+S2 audible,    ABD: Non-tender, BS Audible    EXT: No edema, and no cysnosis. VASCULAR EXAM: Pulses are intact. PSYCHIATRIC EXAM: Mood is appropriate.     Medication:  Current Facility-Administered Medications   Medication Dose Route Frequency    [START ON 7/27/2021] pantoprazole (PROTONIX) tablet 40 mg  40 mg Oral ACB    hydrOXYzine HCL (ATARAX) tablet 25 mg  25 mg Oral TID PRN    levalbuterol (XOPENEX) nebulizer soln 1.25 mg/0.5 ml  1.25 mg Nebulization Q4H RT    piperacillin-tazobactam (ZOSYN) 4.5 g in 0.9% sodium chloride (MBP/ADV) 100 mL MBP  4.5 g IntraVENous Q6H    loperamide (IMODIUM) capsule 2 mg  2 mg Oral Q6H PRN    melatonin (rapid dissolve) tablet 5 mg  5 mg Oral QHS    fentaNYL citrate (PF) injection 50 mcg  50 mcg IntraVENous Q4H PRN    enoxaparin (LOVENOX) injection 40 mg  40 mg SubCUTAneous Q24H    sodium chloride (NS) flush 5-10 mL  5-10 mL IntraVENous PRN    0.9% sodium chloride infusion  75 mL/hr IntraVENous CONTINUOUS    glucose chewable tablet 16 g  4 Tablet Oral PRN    glucagon (GLUCAGEN) injection 1 mg  1 mg IntraMUSCular PRN    dextrose (D50W) injection syrg 12.5-25 g  25-50 mL IntraVENous PRN    LORazepam (ATIVAN) injection 4 mg  4 mg IntraVENous Q4H PRN    sodium chloride (NS) flush 5-40 mL  5-40 mL IntraVENous Q8H    sodium chloride (NS) flush 5-40 mL  5-40 mL IntraVENous PRN    acetaminophen (TYLENOL) tablet 650 mg  650 mg Oral Q6H PRN    Or    acetaminophen (TYLENOL) suppository 650 mg  650 mg Rectal Q6H PRN    polyethylene glycol (MIRALAX) packet 17 g  17 g Oral DAILY PRN    ondansetron (ZOFRAN ODT) tablet 4 mg  4 mg Oral Q8H PRN    Or    ondansetron (ZOFRAN) injection 4 mg  4 mg IntraVENous Q6H PRN    sodium chloride (NS) flush 5-40 mL  5-40 mL IntraVENous PRN    LORazepam (ATIVAN) tablet 1 mg  1 mg Oral Q1H PRN    Or    LORazepam (ATIVAN) injection 1 mg  1 mg IntraVENous Q1H PRN    LORazepam (ATIVAN) tablet 2 mg  2 mg Oral Q1H PRN    Or    LORazepam (ATIVAN) injection 2 mg  2 mg IntraVENous Q1H PRN    LORazepam (ATIVAN) injection 3 mg  3 mg IntraVENous Q15MIN PRN               Lab/Data Reviewed:  Procedures/imaging: see electronic medical records for all procedures/Xrays and details which were not copied into this note but were reviewed prior to creation of Plan       All lab results for the last 24 hours reviewed. Recent Labs     07/26/21  0525 07/25/21  0345 07/24/21  0445   WBC 5.2 5.6 9.0   HGB 11.1* 11.6* 11.5*   HCT 33.2* 34.1* 34.5*    155 134*     Recent Labs     07/26/21  0525 07/25/21  0345 07/24/21  0445   * 145 142   K 3.4* 3.4* 3.7   * 113* 111   CO2 26 27 26   * 109* 75   BUN 8 9 15   CREA 0.84 0.77 0.64   CA 8.2* 8.3* 8.4*       RADIOLOGY:  CT Results  (Last 48 hours)    None        CXR Results  (Last 48 hours)    None            Cardiology Procedures:   Results for orders placed or performed during the hospital encounter of 07/21/21   EKG, 12 LEAD, INITIAL   Result Value Ref Range    Ventricular Rate 130 BPM    Atrial Rate 130 BPM    P-R Interval 134 ms    QRS Duration 80 ms    Q-T Interval 318 ms    QTC Calculation (Bezet) 467 ms    Calculated P Axis 77 degrees    Calculated R Axis 90 degrees    Calculated T Axis 60 degrees    Diagnosis       Sinus tachycardia with fusion complexes  Rightward axis  Borderline ECG  When compared with ECG of 28-DEC-2017 14:54,  fusion complexes are now present  Vent.  rate has increased BY  57 BPM  Non-specific change in ST segment in Inferior leads  Confirmed by Kaitlynn Dunham MD. ([id number]) on 7/21/2021 10:20:03 PM        Echo Results  (Last 48 hours)    None       Cardiolite (Tc-99m Sestamibi) stress test    Signed By: Marcelino Paris MD     July 26, 2021

## 2021-07-26 NOTE — PROGRESS NOTES
Comprehensive Nutrition Assessment    Type and Reason for Visit: Reassess    Nutrition Recommendations/Plan: Other: continue w/ POC    Nutrition Assessment:  Pt admitted for ARDS and aspiration PNA- slowly improving, extubated on 7/23, EZEQUIEL- resolved, encephalopathy- head CT no acute process, h/o Polysubstance abuse and ETOH abuse. Estimated Daily Nutrient Needs:  Energy (kcal): 2092-2266; Weight Used for Energy Requirements: Current  Protein (g): 66-83; Weight Used for Protein Requirements: Current (0.8-1g)  Fluid (ml/day): 2092-2266; Method Used for Fluid Requirements: 1 ml/kcal    Nutrition Related Findings:  Lab: Na 146, K 3.4. med: melatonin, protonix. +2BM 7/25-watery/loose. Diet advanced to regular. Noted PO per nursing documentation of flowsheet: 25-50% on 7/24. Attempted to call pt- no answer. Will continue to monitor PO intake. Wounds:    None       Current Nutrition Therapies:  ADULT DIET Regular    Anthropometric Measures:  · Height:  5' 8\" (172.7 cm)  · Current Body Wt:  82.8 kg (182 lb 8.7 oz)   · Admission Body Wt:  180 lb    · Usual Body Wt:  82.6 kg (182 lb) (6/2021)     · Ideal Body Wt:  154 lbs:  118.5 %   · BMI Category: Overweight (BMI 25.0-29. 9)       Nutrition Diagnosis:   · Inadequate energy intake related to acute injury/trauma as evidenced by intake 26-50%    Nutrition Interventions:   Food and/or Nutrient Delivery: Continue current diet  Nutrition Education and Counseling: No recommendations at this time  Coordination of Nutrition Care: Continue to monitor while inpatient    Goals:  PO intake >75% of estimated nutritional needs throughout the next 3-5 days       Nutrition Monitoring and Evaluation:   Behavioral-Environmental Outcomes: None identified  Food/Nutrient Intake Outcomes: Food and nutrient intake  Physical Signs/Symptoms Outcomes: Biochemical data, Meal time behavior, Skin, Weight, GI status, Nausea/vomiting    Discharge Planning:    Continue current diet Electronically signed by Rossy Colindres RD on 7/26/2021 at 11:35 AM

## 2021-07-26 NOTE — PROGRESS NOTES
Hospitalist Progress Note    Patient: Gilbert Dc MRN: [id number]  CSN: [id number]    YOB: 1985  Age: 30 y.o. Sex: male    DOA: 7/21/2021 LOS:  LOS: 5 days                Assessment/Plan     Patient Active Problem List   Diagnosis Code    Polysubstance abuse ([facility] [address] F19.10    Drug overdose, multiple drugs T50.911A    Elevated LFTs R79.89    Overdose of heroin ([facility] [address] T40.1X1A    EZEQUIEL (acute kidney injury) ([facility] [address] N17.9    ATN (acute tubular necrosis) ([facility]) N17.0    Elevated troponin R77.8    Lactic acidosis E87.2    Encephalopathy G93.40    Acute respiratory failure with hypoxia and hypercapnia (HCC) J96.01, J96.02    Hypokalemia E87.6    ARDS (adult respiratory distress syndrome) ([facility] [address] J80    Aspiration pneumonia ([facility] [address] J69.0    Alcohol abuse F10.10    Hepatitis-C B19.20            Gilbert Dc is a 30 y.o. male with history of for polysubstance abuse was found unresponsive. He was intubated in ER due to severe hypoxia. Denies any complains     Resp -   Acute respiratory failure with hypoxia   Extubated 07/23/2021   ARDS and aspiration pneumonia  Now stable on room air       ID - Follow up blood and urine cx. ANTIBIOTICS zosyn. Rapid COVID 19 negative.     CVS -   Tachycardia:   Cardiac enzymes with mild elevation in troponin  Echo with severely reduced LVF, EF of 15-20%  Cannot use betablocker secondary to cocaine use  Start on ACE-I/ARB BP permitting. For stress test today     Heme/onc -   follow H&H, plts.      Renal -   EZEQUIEL -  resolved     Endocrine -  Follow FSG  Sliding scale for glucose control     Neuro/ Pain/ Sedation -   Stable mental status. ETOH withdrawal  On CIWA protocol.   Unresponsiveness encephalopathy  CT head no acute process      GI - regular diet     Psych -   polysubstance abuse, and alcohol abuse   uds positive for of amphentermine, cocaine.   Agrees to enroll in drug rehab program.    Prophylaxis -   DVT: heparin, GI: protonix      Disposition : TBD    Physical Exam:  General: As above    HEENT: NC, Atraumatic.   anicteric sclerae. Lungs: CTA Bilaterally. No Wheezing/Rhonchi/Rales. Heart:  S1 S2,  No murmur, No Rubs, No Gallops  Abdomen: Soft, Non distended, Non tender.  +Bowel sounds,   Extremities: No c/c/e          Vital signs/Intake and Output:  Visit Vitals  BP (!) 148/87 (BP 1 Location: Left upper arm, BP Patient Position: At rest)   Pulse 78   Temp 98.7 °F (37.1 °C)   Resp 18   Ht 5' 8\" (1.727 m)   Wt 82.8 kg (182 lb 8 oz)   SpO2 99%   BMI 27.75 kg/m²     Current Shift:  No intake/output data recorded. Last three shifts:  07/25 0701 - 07/26 1900  In: 3053.8 [I.V.:3053.8]  Out: 1150 [Urine:1150]            Labs: Results:       Chemistry Recent Labs     07/26/21  0525 07/25/21  0345 07/24/21  0445   * 109* 75   * 145 142   K 3.4* 3.4* 3.7   * 113* 111   CO2 26 27 26   BUN 8 9 15   CREA 0.84 0.77 0.64   CA 8.2* 8.3* 8.4*   AGAP 6 5 5   BUCR 10* 12 23*   AP 47 56 65   TP 5.2* 5.4* 5.1*   ALB 2.6* 2.7* 2.6*   GLOB 2.6 2.7 2.5   AGRAT 1.0 1.0 1.0      CBC w/Diff Recent Labs     07/26/21  0525 07/25/21  0345 07/24/21  0445   WBC 5.2 5.6 9.0   RBC 3.75* 3.86* 3.84*   HGB 11.1* 11.6* 11.5*   HCT 33.2* 34.1* 34.5*    155 134*   GRANS 62 73 84*   LYMPH 24 17* 9*   EOS 3 1 1      Cardiac Enzymes No results for input(s): CPK, CKND1, BUD in the last 72 hours. No lab exists for component: CKRMB, TROIP   Coagulation No results for input(s): PTP, INR, APTT, INREXT, INREXT in the last 72 hours. Lipid Panel No results found for: CHOL, CHOLPOCT, CHOLX, CHLST, CHOLV, 341379, HDL, HDLP, LDL, LDLC, DLDLP, 042412, VLDLC, VLDL, TGLX, TRIGL, TRIGP, TGLPOCT, CHHD, CHHDX   BNP No results for input(s): BNPP in the last 72 hours.    Liver Enzymes Recent Labs     07/26/21  0525   TP 5.2*   ALB 2.6*   AP 47      Thyroid Studies Lab Results   Component Value Date/Time    TSH 1.49 03/11/2016 02:55 AM Procedures/imaging: see electronic medical records for all procedures/Xrays and details which were not copied into this note but were reviewed prior to creation of Plan

## 2021-07-26 NOTE — PROGRESS NOTES
Problem: Falls - Risk of  Goal: *Absence of Falls  Description: Document Mauro Flanagan Fall Risk and appropriate interventions in the flowsheet. Outcome: Progressing Towards Goal  Note: Fall Risk Interventions:       Mentation Interventions: Adequate sleep, hydration, pain control, Door open when patient unattended    Medication Interventions: Patient to call before getting OOB, Teach patient to arise slowly    Elimination Interventions: Call light in reach, Patient to call for help with toileting needs    History of Falls Interventions: Investigate reason for fall, Room close to nurse's station         Problem: Patient Education: Go to Patient Education Activity  Goal: Patient/Family Education  Outcome: Progressing Towards Goal     Problem: Pressure Injury - Risk of  Goal: *Prevention of pressure injury  Description: Document Kamron Scale and appropriate interventions in the flowsheet.   Outcome: Progressing Towards Goal  Note: Pressure Injury Interventions:  Sensory Interventions: Assess changes in LOC, Keep linens dry and wrinkle-free, Minimize linen layers    Moisture Interventions: Absorbent underpads, Maintain skin hydration (lotion/cream), Minimize layers    Activity Interventions: Pressure redistribution bed/mattress(bed type), PT/OT evaluation    Mobility Interventions: Pressure redistribution bed/mattress (bed type), PT/OT evaluation    Nutrition Interventions: Document food/fluid/supplement intake    Friction and Shear Interventions: Apply protective barrier, creams and emollients, Minimize layers                Problem: Patient Education: Go to Patient Education Activity  Goal: Patient/Family Education  Outcome: Progressing Towards Goal

## 2021-07-26 NOTE — PROGRESS NOTES
Problem: Ventilator Management  Goal: *Adequate oxygenation and ventilation  Outcome: Resolved/Met  Goal: *Patient maintains clear airway/free of aspiration  Outcome: Resolved/Met  Goal: *Absence of infection signs and symptoms  Outcome: Resolved/Met  Goal: *Normal spontaneous ventilation  Outcome: Resolved/Met     Problem: Patient Education: Go to Patient Education Activity  Goal: Patient/Family Education  Outcome: Resolved/Met     Problem: Falls - Risk of  Goal: *Absence of Falls  Description: Document Gallo Fall Risk and appropriate interventions in the flowsheet. Outcome: Progressing Towards Goal  Note: Fall Risk Interventions:       Mentation Interventions: Adequate sleep, hydration, pain control    Medication Interventions: Bed/chair exit alarm, Patient to call before getting OOB, Teach patient to arise slowly    Elimination Interventions: Bed/chair exit alarm, Call light in reach, Patient to call for help with toileting needs, Toilet paper/wipes in reach, Toileting schedule/hourly rounds    History of Falls Interventions: Bed/chair exit alarm, Door open when patient unattended, Investigate reason for fall         Problem: Patient Education: Go to Patient Education Activity  Goal: Patient/Family Education  Outcome: Progressing Towards Goal     Problem: Pressure Injury - Risk of  Goal: *Prevention of pressure injury  Description: Document Kamron Scale and appropriate interventions in the flowsheet.   Outcome: Progressing Towards Goal  Note: Pressure Injury Interventions:  Sensory Interventions: Assess changes in LOC    Moisture Interventions: Absorbent underpads    Activity Interventions: Increase time out of bed, PT/OT evaluation, Pressure redistribution bed/mattress(bed type)    Mobility Interventions: Pressure redistribution bed/mattress (bed type), PT/OT evaluation    Nutrition Interventions: Document food/fluid/supplement intake    Friction and Shear Interventions: HOB 30 degrees or less Problem: Patient Education: Go to Patient Education Activity  Goal: Patient/Family Education  Outcome: Progressing Towards Goal     Problem: Non-Violent Restraints  Goal: Removal from restraints as soon as assessed to be safe  Outcome: Resolved/Met  Goal: No harm/injury to patient while restraints in use  Outcome: Resolved/Met  Goal: Patient's dignity will be maintained  Outcome: Resolved/Met  Goal: Patient Interventions  Outcome: Resolved/Met     Problem: Non-Violent Restraints  Goal: Removal from restraints as soon as assessed to be safe  Outcome: Resolved/Met  Goal: No harm/injury to patient while restraints in use  Outcome: Resolved/Met  Goal: Patient's dignity will be maintained  Outcome: Resolved/Met  Goal: Patient Interventions  Outcome: Resolved/Met     Problem: Nutrition Deficit  Goal: *Optimize nutritional status  Outcome: Resolved/Met

## 2021-07-26 NOTE — PROGRESS NOTES
Problem: Dysphagia (Adult)  Goal: *Acute Goals and Plan of Care (Insert Text)  Description: Recommendations:  Diet: Regular  Meds: As tolerated  Aspiration Precautions  Oral Care TID    Goals:  Patient will:  1. Tolerate PO trials with 0 s/s overt distress in 4/5 trials  2. Utilize compensatory swallow strategies/maneuvers (decrease bite/sip, size/rate, alt. liq/sol) with min cues in 4/5 trials     Outcome: Resolved/Met  SPEECH LANGUAGE PATHOLOGY BEDSIDE SWALLOW EVALUATION AND DISCHARGE    Patient: Flash Martinez (30 y.o. male)  Date: 7/26/2021  Primary Diagnosis: ARDS (adult respiratory distress syndrome) ([facility]) [J80]  Precautions: Aspiration, falls  PLOF: Regular    ASSESSMENT :  Clinical beside swallow eval completed per MD orders. Pt A&Ox4. Functional communication. Intelligibility >90%. Cognitive-linguistic function appears intact. OM examination revealed oral motor structures functional for mastication and deglutition. Presented with thin liquid, puree, and solid trials. Exhibited adequate bolus cohesion, manipulation and A-P transit. Further exhibited timely swallow timing/reflex and hyolaryngeal excursion upon palpation. Pt able to manipulate and clear with 0 clinical s/s aspiration and/or oropharyngeal dysphagia. Pt safe for regular solid, thin liquid diet. 0 formal ST needs for dysphagia indicated at this time. SLP educated pt on role of speech therapist in current setting with re: speech/swallow; verbalized comprehension. Results d/w RN, [place]. Thank you for this referral.   Sara Harp, SLP     PLAN :  Recommendations and Planned Interventions:  No formal ST needs ID'd for dysphagia. Eval only. Discharge Recommendations: None     SUBJECTIVE:   Patient stated My throat is a little sore from the tube, but I'm about to tear up some [place]. \"    OBJECTIVE:     Past Medical History:   Diagnosis Date    Drug abuse, IV ([address]     Heroin abuse ([facility] [address]     Psychiatric diagnosis     ADHD Psychiatric diagnosis     drug abuse. Psychiatric disorder     anxiety   No past surgical history on file. Diet prior to admission: Regular  Current Diet:  Regular    Cognitive and Communication Status:  Neurologic State: Alert  Orientation Level: Oriented X4  Cognition: Appropriate decision making  Perception: Appears intact  Perseveration: No perseveration noted  Safety/Judgement: Awareness of environment  Oral Assessment:  Oral Assessment  Labial: No impairment  Dentition: Natural  Oral Hygiene:  (Fair)  Lingual: No impairment  Velum: No impairment  Mandible: No impairment  Gag Reflex: Other (comment)  P. O. Trials:  Patient Position:  (Up on EOB)  Vocal quality prior to P.O.: No impairment  Consistency Presented: Ice chips;Mechanical soft;Puree; Solid; Thin liquid  How Presented: Self-fed/presented     Bolus Acceptance: No impairment  Bolus Formation/Control: No impairment     Propulsion: No impairment  Oral Residue: None  Initiation of Swallow: No impairment  Laryngeal Elevation: Functional  Aspiration Signs/Symptoms: None  Pharyngeal Phase Characteristics: No impairment, issues, or problems   Effective Modifications: Alternate liquids/solids  Cues for Modifications: None     Oral Phase Severity: No impairment  Pharyngeal Phase Severity : No impairment    PAIN:  Pain level pre-treatment: 0/10   Pain level post-treatment: 0/10     After evaluation:   []            Patient left in no apparent distress sitting up in chair  [x]            Patient left in no apparent distress in bed  [x]            Call bell left within reach  [x]            Nursing notified  [x]            Family present  []            Caregiver present  []            Bed alarm activated      COMMUNICATION/EDUCATION:   [x]            Aspiration precautions; swallow safety; compensatory techniques. []            Patient/family have participated as able in goal setting and plan of care.   []            Patient/family agree to work toward stated goals and plan of care. []            Patient understands intent and goals of therapy; neutral about participation. []            Patient unable to participate in goal setting/plan of care; educ ongoing with interdisciplinary staff  []         Posted safety precautions in patient's room.     Thank you for this referral.  Issa Walton, SLP  Time Calculation: 15 mins

## 2021-07-26 NOTE — ROUTINE PROCESS
Bedside and Verbal shift change report given to CHIKI Herr R.N. (oncoming nurse) by AUBREY Borjas R.N. (offgoing nurse). Report included the following information SBAR, Kardex, Intake/Output, MAR, Accordion, Recent Results, and Med Rec Status.

## 2021-07-26 NOTE — ROUTINE PROCESS
Assume care of patient from [address]. Patient received in bed awake. Patient A&Ox4, denies pain and discomfort. No distress noted. Bed locked in low position. Call bell within reach and patient verbalized understanding of use for assistance and needs. 0732- Bedside and Verbal shift change report given to Indira Caruso RN (oncoming nurse) by Dayana Germain RN (offgoing nurse). Report included the following information SBAR, Kardex, Intake/Output, MAR and Recent Results. [address] [place] Cardiac/Medical Night Shift Chart Audit    Chart Audit completed?  YES

## 2021-07-26 NOTE — PROGRESS NOTES
0730:Received verbal bedside report from off going nurse NINO Garcia Patient care received. Patient alert and oriented x 4. Patient resting in bed denies pain. Patient stable. Call light with in reach bed in lowest position.      1250:Pt back from stress test.

## 2021-07-27 ENCOUNTER — APPOINTMENT (OUTPATIENT)
Dept: NON INVASIVE DIAGNOSTICS | Age: 36
DRG: 917 | End: 2021-07-27
Attending: INTERNAL MEDICINE
Payer: COMMERCIAL

## 2021-07-27 VITALS
BODY MASS INDEX: 27.58 KG/M2 | WEIGHT: 182 LBS | OXYGEN SATURATION: 97 % | SYSTOLIC BLOOD PRESSURE: 135 MMHG | DIASTOLIC BLOOD PRESSURE: 81 MMHG | HEART RATE: 68 BPM | RESPIRATION RATE: 18 BRPM | HEIGHT: 68 IN | TEMPERATURE: 98.5 F

## 2021-07-27 LAB
B PERT DNA SPEC QL NAA+PROBE: NOT DETECTED
BACTERIA SPEC CULT: NORMAL
BACTERIA SPEC CULT: NORMAL
BORDETELLA PARAPERTUSSIS PCR, BORPAR: NOT DETECTED
C PNEUM DNA SPEC QL NAA+PROBE: NOT DETECTED
CA-I SERPL-SCNC: 1.15 MMOL/L (ref 1.12–1.32)
ECHO AV ANNULUS DIAM: 3.27 CM
ECHO AV AREA PEAK VELOCITY: 2.08 CM2
ECHO AV AREA VTI: 2.31 CM2
ECHO AV AREA/BSA PEAK VELOCITY: 1.1 CM2/M2
ECHO AV AREA/BSA VTI: 1.2 CM2/M2
ECHO AV MEAN GRADIENT: 4.22 MMHG
ECHO AV PEAK GRADIENT: 8.46 MMHG
ECHO AV PEAK VELOCITY: 145 CM/S
ECHO AV VTI: 28.61 CM
ECHO IVC PROX: 1.91 CM
ECHO LV E' LATERAL VELOCITY: 18 CM/S
ECHO LV E' SEPTAL VELOCITY: 16 CM/S
ECHO LV EDV A2C: 145.72 ML
ECHO LV EDV A4C: 133.09 ML
ECHO LV EDV BP: 140.32 ML (ref 67–155)
ECHO LV EJECTION FRACTION A2C: 64 %
ECHO LV EJECTION FRACTION A4C: 70 %
ECHO LV EJECTION FRACTION BIPLANE: 66.3 % (ref 55–100)
ECHO LV ESV A2C: 52.22 ML
ECHO LV ESV A4C: 40.46 ML
ECHO LV ESV BP: 47.34 ML (ref 22–58)
ECHO LV INTERNAL DIMENSION DIASTOLIC: 5.76 CM (ref 4.2–5.9)
ECHO LV INTERNAL DIMENSION SYSTOLIC: 3.57 CM
ECHO LV IVSD: 0.62 CM (ref 0.6–1)
ECHO LV MASS 2D: 145.7 G (ref 88–224)
ECHO LV MASS INDEX 2D: 74.3 G/M2 (ref 49–115)
ECHO LV POSTERIOR WALL DIASTOLIC: 0.77 CM (ref 0.6–1)
ECHO LVOT CARDIAC OUTPUT: 12.96 L/MIN
ECHO LVOT DIAM: 2.16 CM
ECHO LVOT PEAK GRADIENT: 2.71 MMHG
ECHO LVOT PEAK VELOCITY: 82 CM/S
ECHO LVOT SV: 66.2 ML
ECHO LVOT SV: 93.5 ML
ECHO LVOT VTI: 18 CM
ECHO MV A VELOCITY: 40 CM/S
ECHO MV E VELOCITY: 79 CM/S
ECHO MV E/A RATIO: 1.98
ECHO MV E/E' LATERAL: 4.39
ECHO MV E/E' RATIO (AVERAGED): 4.66
ECHO MV E/E' SEPTAL: 4.94
FLUAV SUBTYP SPEC NAA+PROBE: NOT DETECTED
FLUBV RNA SPEC QL NAA+PROBE: NOT DETECTED
HADV DNA SPEC QL NAA+PROBE: NOT DETECTED
HCOV 229E RNA SPEC QL NAA+PROBE: NOT DETECTED
HCOV HKU1 RNA SPEC QL NAA+PROBE: NOT DETECTED
HCOV NL63 RNA SPEC QL NAA+PROBE: NOT DETECTED
HCOV OC43 RNA SPEC QL NAA+PROBE: NOT DETECTED
HMPV RNA SPEC QL NAA+PROBE: NOT DETECTED
HPIV1 RNA SPEC QL NAA+PROBE: NOT DETECTED
HPIV2 RNA SPEC QL NAA+PROBE: NOT DETECTED
HPIV3 RNA SPEC QL NAA+PROBE: NOT DETECTED
HPIV4 RNA SPEC QL NAA+PROBE: NOT DETECTED
LVOT MG: 1.59 MMHG
M PNEUMO DNA SPEC QL NAA+PROBE: NOT DETECTED
MAGNESIUM SERPL-MCNC: 2 MG/DL (ref 1.6–2.6)
PHOSPHATE SERPL-MCNC: 3.9 MG/DL (ref 2.5–4.9)
RSV RNA SPEC QL NAA+PROBE: NOT DETECTED
RV+EV RNA SPEC QL NAA+PROBE: NOT DETECTED
SARS-COV-2 PCR, COVPCR: NOT DETECTED
SERVICE CMNT-IMP: NORMAL
SERVICE CMNT-IMP: NORMAL

## 2021-07-27 PROCEDURE — 74011250636 HC RX REV CODE- 250/636: Performed by: HOSPITALIST

## 2021-07-27 PROCEDURE — 77030004522 HC CATH ANGI DX EXPO BSC -A: Performed by: INTERNAL MEDICINE

## 2021-07-27 PROCEDURE — 99152 MOD SED SAME PHYS/QHP 5/>YRS: CPT | Performed by: INTERNAL MEDICINE

## 2021-07-27 PROCEDURE — 74011250637 HC RX REV CODE- 250/637: Performed by: HOSPITALIST

## 2021-07-27 PROCEDURE — 4A023N7 MEASUREMENT OF CARDIAC SAMPLING AND PRESSURE, LEFT HEART, PERCUTANEOUS APPROACH: ICD-10-PCS | Performed by: INTERNAL MEDICINE

## 2021-07-27 PROCEDURE — 74011250636 HC RX REV CODE- 250/636: Performed by: EMERGENCY MEDICINE

## 2021-07-27 PROCEDURE — 93458 L HRT ARTERY/VENTRICLE ANGIO: CPT | Performed by: INTERNAL MEDICINE

## 2021-07-27 PROCEDURE — 99153 MOD SED SAME PHYS/QHP EA: CPT | Performed by: INTERNAL MEDICINE

## 2021-07-27 PROCEDURE — 74011000636 HC RX REV CODE- 636: Performed by: INTERNAL MEDICINE

## 2021-07-27 PROCEDURE — 74011250637 HC RX REV CODE- 250/637: Performed by: INTERNAL MEDICINE

## 2021-07-27 PROCEDURE — 74011250636 HC RX REV CODE- 250/636: Performed by: INTERNAL MEDICINE

## 2021-07-27 PROCEDURE — 93321 DOPPLER ECHO F-UP/LMTD STD: CPT

## 2021-07-27 PROCEDURE — C1894 INTRO/SHEATH, NON-LASER: HCPCS | Performed by: INTERNAL MEDICINE

## 2021-07-27 PROCEDURE — C1769 GUIDE WIRE: HCPCS | Performed by: INTERNAL MEDICINE

## 2021-07-27 PROCEDURE — 83735 ASSAY OF MAGNESIUM: CPT

## 2021-07-27 PROCEDURE — B2111ZZ FLUOROSCOPY OF MULTIPLE CORONARY ARTERIES USING LOW OSMOLAR CONTRAST: ICD-10-PCS | Performed by: INTERNAL MEDICINE

## 2021-07-27 PROCEDURE — 77030004521 HC CATH ANGI DX COOK -B: Performed by: INTERNAL MEDICINE

## 2021-07-27 PROCEDURE — 84100 ASSAY OF PHOSPHORUS: CPT

## 2021-07-27 PROCEDURE — 77030008543 HC TBNG MON PRSS MRTM -A: Performed by: INTERNAL MEDICINE

## 2021-07-27 PROCEDURE — B2151ZZ FLUOROSCOPY OF LEFT HEART USING LOW OSMOLAR CONTRAST: ICD-10-PCS | Performed by: INTERNAL MEDICINE

## 2021-07-27 PROCEDURE — 77030013797 HC KT TRNSDUC PRSSR EDWD -A: Performed by: INTERNAL MEDICINE

## 2021-07-27 PROCEDURE — 74011000250 HC RX REV CODE- 250: Performed by: INTERNAL MEDICINE

## 2021-07-27 PROCEDURE — 77030042317 HC BND COMPR HEMSTAT -B: Performed by: INTERNAL MEDICINE

## 2021-07-27 PROCEDURE — 36415 COLL VENOUS BLD VENIPUNCTURE: CPT

## 2021-07-27 PROCEDURE — 82330 ASSAY OF CALCIUM: CPT

## 2021-07-27 PROCEDURE — 77030004566 HC CATH ANGI DX TORCON COOK -B: Performed by: INTERNAL MEDICINE

## 2021-07-27 PROCEDURE — 74011000258 HC RX REV CODE- 258: Performed by: EMERGENCY MEDICINE

## 2021-07-27 RX ORDER — FENTANYL CITRATE 50 UG/ML
INJECTION, SOLUTION INTRAMUSCULAR; INTRAVENOUS AS NEEDED
Status: DISCONTINUED | OUTPATIENT
Start: 2021-07-27 | End: 2021-07-27 | Stop reason: HOSPADM

## 2021-07-27 RX ORDER — SODIUM CHLORIDE 0.9 % (FLUSH) 0.9 %
5-40 SYRINGE (ML) INJECTION AS NEEDED
Status: DISCONTINUED | OUTPATIENT
Start: 2021-07-27 | End: 2021-07-27 | Stop reason: HOSPADM

## 2021-07-27 RX ORDER — HEPARIN SODIUM 1000 [USP'U]/ML
INJECTION, SOLUTION INTRAVENOUS; SUBCUTANEOUS AS NEEDED
Status: DISCONTINUED | OUTPATIENT
Start: 2021-07-27 | End: 2021-07-27 | Stop reason: HOSPADM

## 2021-07-27 RX ORDER — LORAZEPAM 1 MG/1
1 TABLET ORAL
Qty: 6 TABLET | Refills: 0 | Status: ON HOLD | OUTPATIENT
Start: 2021-07-27 | End: 2022-04-28

## 2021-07-27 RX ORDER — PHENYLEPHRINE HYDROCHLORIDE 10 MG/ML
INJECTION INTRAVENOUS AS NEEDED
Status: DISCONTINUED | OUTPATIENT
Start: 2021-07-27 | End: 2021-07-27 | Stop reason: HOSPADM

## 2021-07-27 RX ORDER — LIDOCAINE HYDROCHLORIDE 10 MG/ML
INJECTION INFILTRATION; PERINEURAL AS NEEDED
Status: DISCONTINUED | OUTPATIENT
Start: 2021-07-27 | End: 2021-07-27 | Stop reason: HOSPADM

## 2021-07-27 RX ORDER — HEPARIN SODIUM 200 [USP'U]/100ML
INJECTION, SOLUTION INTRAVENOUS
Status: COMPLETED | OUTPATIENT
Start: 2021-07-27 | End: 2021-07-27

## 2021-07-27 RX ORDER — MIDAZOLAM HYDROCHLORIDE 1 MG/ML
INJECTION, SOLUTION INTRAMUSCULAR; INTRAVENOUS AS NEEDED
Status: DISCONTINUED | OUTPATIENT
Start: 2021-07-27 | End: 2021-07-27 | Stop reason: HOSPADM

## 2021-07-27 RX ORDER — AMOXICILLIN AND CLAVULANATE POTASSIUM 875; 125 MG/1; MG/1
1 TABLET, FILM COATED ORAL EVERY 12 HOURS
Qty: 14 TABLET | Refills: 0 | Status: ON HOLD | OUTPATIENT
Start: 2021-07-27 | End: 2022-04-28

## 2021-07-27 RX ORDER — POTASSIUM CHLORIDE 20 MEQ/1
40 TABLET, EXTENDED RELEASE ORAL
Status: COMPLETED | OUTPATIENT
Start: 2021-07-27 | End: 2021-07-27

## 2021-07-27 RX ORDER — LEVALBUTEROL 1.25 MG/.5ML
1.25 SOLUTION, CONCENTRATE RESPIRATORY (INHALATION)
Status: DISCONTINUED | OUTPATIENT
Start: 2021-07-27 | End: 2021-07-27 | Stop reason: HOSPADM

## 2021-07-27 RX ORDER — SODIUM CHLORIDE 0.9 % (FLUSH) 0.9 %
5-40 SYRINGE (ML) INJECTION EVERY 8 HOURS
Status: DISCONTINUED | OUTPATIENT
Start: 2021-07-27 | End: 2021-07-27 | Stop reason: HOSPADM

## 2021-07-27 RX ORDER — ASPIRIN 81 MG/1
81 TABLET ORAL DAILY
Status: DISCONTINUED | OUTPATIENT
Start: 2021-07-27 | End: 2021-07-27 | Stop reason: HOSPADM

## 2021-07-27 RX ORDER — SODIUM CHLORIDE 9 MG/ML
INJECTION, SOLUTION INTRAVENOUS
Status: COMPLETED | OUTPATIENT
Start: 2021-07-27 | End: 2021-07-27

## 2021-07-27 RX ORDER — VERAPAMIL HYDROCHLORIDE 2.5 MG/ML
INJECTION, SOLUTION INTRAVENOUS AS NEEDED
Status: DISCONTINUED | OUTPATIENT
Start: 2021-07-27 | End: 2021-07-27 | Stop reason: HOSPADM

## 2021-07-27 RX ADMIN — PANTOPRAZOLE SODIUM 40 MG: 40 TABLET, DELAYED RELEASE ORAL at 06:52

## 2021-07-27 RX ADMIN — PIPERACILLIN AND TAZOBACTAM 4.5 G: 4; .5 INJECTION, POWDER, LYOPHILIZED, FOR SOLUTION INTRAVENOUS; PARENTERAL at 10:26

## 2021-07-27 RX ADMIN — PIPERACILLIN AND TAZOBACTAM 4.5 G: 4; .5 INJECTION, POWDER, LYOPHILIZED, FOR SOLUTION INTRAVENOUS; PARENTERAL at 17:37

## 2021-07-27 RX ADMIN — POTASSIUM CHLORIDE 40 MEQ: 1500 TABLET, EXTENDED RELEASE ORAL at 12:28

## 2021-07-27 RX ADMIN — PIPERACILLIN AND TAZOBACTAM 4.5 G: 4; .5 INJECTION, POWDER, LYOPHILIZED, FOR SOLUTION INTRAVENOUS; PARENTERAL at 03:42

## 2021-07-27 RX ADMIN — LORAZEPAM 2 MG: 1 TABLET ORAL at 03:43

## 2021-07-27 RX ADMIN — LORAZEPAM 1 MG: 2 INJECTION INTRAMUSCULAR at 10:26

## 2021-07-27 RX ADMIN — ASPIRIN 81 MG: 81 TABLET, COATED ORAL at 12:28

## 2021-07-27 NOTE — PROGRESS NOTES
Pt prepped and ready for procedure. Currently watching the cath video. 1140 - ECHO being done at bedside    1320 - Pt to cath lab for procedure. 1405 - Returned from cath. Report received. Right TR band intact to radial artery with no bleeding/oozing noted. CM SR. Pt denies c/o at this time. Emotional. Talking with family. 1510 - Began releasing TR Band per protocol. 1540 - TR Band released. Site without complications. Pt educated on importance of keeping the immobilizer on for 24 hours even if he goes home. Also instructed to let the nurse know immediately if he notices bleeding, swelling or changes in sensation to fingers. 1610 - Discussed need for telemetry with Dr. Farooq Dejesus. He states that the pt doesn't need tele when he goes back to his room. 1 - Transported to room 359 3N via bed with nurse, on monitor in stable condition.

## 2021-07-27 NOTE — PROGRESS NOTES
Hospitalist Progress Note    Patient: Kaylin Diaz MRN: [id number]  CSN: [id number]    YOB: 1985  Age: 30 y.o. Sex: male    DOA: 7/21/2021 LOS:  LOS: 6 days          Chief Complaint:      Assessment/Plan     Cleopatra Larose a 30 y. o. male with history of for polysubstance abuse was found unresponsive.  He was intubated in ER due to severe hypoxia.     Acute respiratory failure with hypoxia-  Intubated in the ER on 7/21/2021  Dated 7/23/2021  ARDS and aspiration pneumonia  Now stable on room air  Has been on IV Zosyn, will change to Augmentin for 7-day course    Coronary artery disease -  History of cardiomyopathy but now EF has normalized  Abnormal Lexiscan nuclear stress test  Complicating conditions of polysubstance abuse and tobacco abuse  Catheterization done today, no significant obstructive coronary artery disease found  Further cardiac testing recommended    Tachycardia, improved  EtOH withdrawal  Acute encephalopathy -resolved  Polysubstance abuse, alcohol abuse and tobacco abuse  UDS positive for amphetamines and cocaine  During this admission agreed to enroll in drug rehab program  Today asked for Ativan to be discharged home on as he said it helped him relax, agreed to give him a minimal dose    Patient's mother at bedside    Disposition : Home  Patient Active Problem List   Diagnosis Code    Polysubstance abuse ([facility] [address] F19.10    Drug overdose, multiple drugs T50.911A    Elevated LFTs R79.89    Overdose of heroin ([address] T40.1X1A    EZEQUIEL (acute kidney injury) ([address] N17.9    ATN (acute tubular necrosis) ([address] N17.0    Elevated troponin R77.8    Lactic acidosis E87.2    Encephalopathy G93.40    Acute respiratory failure with hypoxia and hypercapnia (HCC) J96.01, J96.02    Hypokalemia E87.6    ARDS (adult respiratory distress syndrome) ([address] J80    Aspiration pneumonia (HCC) J69.0    Alcohol abuse F10.10    Hepatitis-C B19.20       Subjective:    Patient ready to go home, is feeling so much better than when he was first admitted. Review of systems:    Constitutional: denies fevers, chills, myalgias  Respiratory: denies SOB, cough  Cardiovascular: denies chest pain, palpitations  Gastrointestinal: denies nausea, vomiting, diarrhea      Vital signs/Intake and Output:  Visit Vitals  /83   Pulse 71   Temp 98.3 °F (36.8 °C)   Resp 16   Ht 5' 8\" (1.727 m)   Wt 82.8 kg (182 lb 8 oz)   SpO2 98%   BMI 27.75 kg/m²     Current Shift:  No intake/output data recorded. Last three shifts:  07/25 1901 - 07/27 0700  In: 3053.8 [I.V.:3053.8]  Out: -     Exam:    General: Well developed, alert, NAD, OX3  Head/Neck: NCAT, supple, No masses, No lymphadenopathy  CVS:Regular rate and rhythm, no M/R/G, S1/S2 heard, no thrill  Lungs:Clear to auscultation bilaterally, no wheezes, rhonchi, or rales  Abdomen: Soft, Nontender, No distention, Normal Bowel sounds, No hepatomegaly  Extremities: No C/C/E, pulses palpable 2+  Skin:normal texture and turgor, no rashes, no lesions  Neuro:grossly normal , follows commands  Psych:appropriate                Labs: Results:       Chemistry Recent Labs     07/26/21  0525 07/25/21  0345   * 109*   * 145   K 3.4* 3.4*   * 113*   CO2 26 27   BUN 8 9   CREA 0.84 0.77   CA 8.2* 8.3*   AGAP 6 5   BUCR 10* 12   AP 47 56   TP 5.2* 5.4*   ALB 2.6* 2.7*   GLOB 2.6 2.7   AGRAT 1.0 1.0      CBC w/Diff Recent Labs     07/26/21  0525 07/25/21  0345   WBC 5.2 5.6   RBC 3.75* 3.86*   HGB 11.1* 11.6*   HCT 33.2* 34.1*    155   GRANS 62 73   LYMPH 24 17*   EOS 3 1      Cardiac Enzymes No results for input(s): CPK, CKND1, BUD in the last 72 hours. No lab exists for component: CKRMB, TROIP   Coagulation No results for input(s): PTP, INR, APTT, INREXT in the last 72 hours.     Lipid Panel No results found for: CHOL, CHOLPOCT, CHOLX, CHLST, CHOLV, 812479, HDL, HDLP, LDL, LDLC, DLDLP, 612643, VLDLC, VLDL, TGLX, TRIGL, TRIGP, TGLPOCT, CHHD, CHHDX   BNP No results for input(s): BNPP in the last 72 hours.    Liver Enzymes Recent Labs     07/26/21  0525   TP 5.2*   ALB 2.6*   AP 47      Thyroid Studies Lab Results   Component Value Date/Time    TSH 1.49 03/11/2016 02:55 AM        Procedures/imaging: see electronic medical records for all procedures/Xrays and details which were not copied into this note but were reviewed prior to creation of Adrian Lares MD

## 2021-07-27 NOTE — PROGRESS NOTES
Cardiology Progress Note        Patient: Sarabjit Kramer        Sex: male          DOA: 7/21/2021  YOB: 1985      Age:  30 y.o.        LOS:  LOS: 6 days   Assessment/Plan     Principal Problem:    ARDS (adult respiratory distress syndrome) ([address] (7/21/2021)    Active Problems:    Polysubstance abuse ([address] (6/15/2013)      Drug overdose, multiple drugs (6/15/2013)      EZEQUIEL (acute kidney injury) ([address] (3/10/2016)      Encephalopathy (3/10/2016)      Acute respiratory failure with hypoxia and hypercapnia ([address] (3/13/2016)      Aspiration pneumonia ([facility] (7/21/2021)      Alcohol abuse (7/21/2021)      Hepatitis-C (7/21/2021)        Plan:    Cardiomyopathy  EF normalized  Polysubstance abuse  Abnormal Lexiscan nuclear stress test  Smoking     planned. Left heart catheterization possible PCI   Risk, benefits and alternatives were discussed in detail with the patient and mother both agree to proceed. Rest of the recommendation after cardiac catheterization. Subjective:    cc:  Cardiomyopathy        REVIEW OF SYSTEMS:     General: No fevers or chills. Cardiovascular: No chest pain or pressure. No palpitations. No ankle swelling  Pulmonary: No SOB, orthopnea, PND  Gastrointestinal: No nausea, vomiting or diarrhea      Objective:      Visit Vitals  BP (!) 140/85   Pulse (!) 58   Temp 98.4 °F (36.9 °C)   Resp 16   Ht 5' 8\" (1.727 m)   Wt 82.6 kg (182 lb)   SpO2 98%   BMI 27.67 kg/m²     Body mass index is 27.67 kg/m². Physical Exam:  General Appearance: Comfortable, not using accessory muscles of respiration. NECK: No JVD, no thyroidomeglay. LUNGS: Clear bilaterally. HEART: S1+S2 audible,    ABD: Non-tender, BS Audible    EXT: No edema, and no cysnosis. VASCULAR EXAM: Pulses are intact. PSYCHIATRIC EXAM: Mood is appropriate.     Medication:  Current Facility-Administered Medications   Medication Dose Route Frequency    levalbuterol (Heidy Burleson) nebulizer soln 1.25 mg/0.5 ml  1.25 mg Nebulization QID RT    perflutren lipid microspheres (DEFINITY) in NS bolus IV  1 mL IntraVENous RAD ONCE    aspirin delayed-release tablet 81 mg  81 mg Oral DAILY    pantoprazole (PROTONIX) tablet 40 mg  40 mg Oral ACB    0.45% sodium chloride infusion  75 mL/hr IntraVENous CONTINUOUS    hydrOXYzine HCL (ATARAX) tablet 25 mg  25 mg Oral TID PRN    piperacillin-tazobactam (ZOSYN) 4.5 g in 0.9% sodium chloride (MBP/ADV) 100 mL MBP  4.5 g IntraVENous Q6H    loperamide (IMODIUM) capsule 2 mg  2 mg Oral Q6H PRN    melatonin (rapid dissolve) tablet 5 mg  5 mg Oral QHS    fentaNYL citrate (PF) injection 50 mcg  50 mcg IntraVENous Q4H PRN    enoxaparin (LOVENOX) injection 40 mg  40 mg SubCUTAneous Q24H    sodium chloride (NS) flush 5-10 mL  5-10 mL IntraVENous PRN    glucose chewable tablet 16 g  4 Tablet Oral PRN    glucagon (GLUCAGEN) injection 1 mg  1 mg IntraMUSCular PRN    dextrose (D50W) injection syrg 12.5-25 g  25-50 mL IntraVENous PRN    LORazepam (ATIVAN) injection 4 mg  4 mg IntraVENous Q4H PRN    sodium chloride (NS) flush 5-40 mL  5-40 mL IntraVENous Q8H    sodium chloride (NS) flush 5-40 mL  5-40 mL IntraVENous PRN    acetaminophen (TYLENOL) tablet 650 mg  650 mg Oral Q6H PRN    Or    acetaminophen (TYLENOL) suppository 650 mg  650 mg Rectal Q6H PRN    polyethylene glycol (MIRALAX) packet 17 g  17 g Oral DAILY PRN    ondansetron (ZOFRAN ODT) tablet 4 mg  4 mg Oral Q8H PRN    Or    ondansetron (ZOFRAN) injection 4 mg  4 mg IntraVENous Q6H PRN    sodium chloride (NS) flush 5-40 mL  5-40 mL IntraVENous PRN    LORazepam (ATIVAN) tablet 1 mg  1 mg Oral Q1H PRN    Or    LORazepam (ATIVAN) injection 1 mg  1 mg IntraVENous Q1H PRN    LORazepam (ATIVAN) tablet 2 mg  2 mg Oral Q1H PRN    Or    LORazepam (ATIVAN) injection 2 mg  2 mg IntraVENous Q1H PRN    LORazepam (ATIVAN) injection 3 mg  3 mg IntraVENous Q15MIN PRN               Lab/Data Reviewed:  Procedures/imaging: see electronic medical records for all procedures/Xrays   and details which were not copied into this note but were reviewed prior to creation of Plan       All lab results for the last 24 hours reviewed. Recent Labs     07/26/21  0525 07/25/21  0345   WBC 5.2 5.6   HGB 11.1* 11.6*   HCT 33.2* 34.1*    155     Recent Labs     07/26/21  0525 07/25/21  0345   * 145   K 3.4* 3.4*   * 113*   CO2 26 27   * 109*   BUN 8 9   CREA 0.84 0.77   CA 8.2* 8.3*       RADIOLOGY:  CT Results  (Last 48 hours)    None        CXR Results  (Last 48 hours)    None            Cardiology Procedures:   Results for orders placed or performed during the hospital encounter of 07/21/21   EKG, 12 LEAD, INITIAL   Result Value Ref Range    Ventricular Rate 130 BPM    Atrial Rate 130 BPM    P-R Interval 134 ms    QRS Duration 80 ms    Q-T Interval 318 ms    QTC Calculation (Bezet) 467 ms    Calculated P Axis 77 degrees    Calculated R Axis 90 degrees    Calculated T Axis 60 degrees    Diagnosis       Sinus tachycardia with fusion complexes  Rightward axis  Borderline ECG  When compared with ECG of 28-DEC-2017 14:54,  fusion complexes are now present  Vent.  rate has increased BY  57 BPM  Non-specific change in ST segment in Inferior leads  Confirmed by Gregory Clarke MD. ([id number]) on 7/21/2021 10:20:03 PM        Echo Results  (Last 48 hours)    None       Cardiolite (Tc-99m Sestamibi) stress test    Signed By: Belem Titus MD     July 27, 2021

## 2021-07-27 NOTE — PROGRESS NOTES
0700:Received verbal bedside report from off going nurse CHIKI Herr R.N. Patient care received. Patient alert and oriented x 4. Patient resting in bed denies pain. Patient stable. Call light with in reach bed in lowest position. 1046:Informed  that patient did not have aspirin ordered and that his potassium was 3.4 this morning.  gave telephone orders for 81 mg of aspirin delayed release to start now and continue daily. He also gave orders for one time dose  40 MEQ's of oral potassium. Will notify care unit nurse.     1700:Patient back on floor.

## 2021-07-27 NOTE — PROGRESS NOTES
Cardiology Progress Note        Patient: Nilam Aguilar        Sex: male          DOA: 7/21/2021  YOB: 1985      Age:  30 y.o.        LOS:  LOS: 6 days   Assessment/Plan   Cardiac catheterization done. No significant obstructive coronary artery disease. Patient have minimal luminal irregularities. EF has normalized on the echocardiogram. Discussed with patient and mother. Continue with aggressive risk factor management. No further cardiac testing is recommended. Patient can be discharged from cardiac standpoint. Continue to follow with PMD for risk factor management and lifestyle changes.     Signed By: Ra Estrada MD     July 27, 2021

## 2021-07-27 NOTE — ROUTINE PROCESS
Cardiac Cath Lab:  Pre Procedure Chart Check     Patients chart was accessed and reviewed for possible and/or scheduled procedure. Creatinine Clearance:  Serum creatinine: 0.84 mg/dL 07/26/21 0525  Estimated creatinine clearance: 130 mL/min    Total Contrast  Load:  3 x estimated clearance amount=  390ml    75% of Contrast Load:  0.75 x Total Contrast Load=    292.5ml    Recent Labs     07/26/21  0525   WBC 5.2   RBC 3.75*   HCT 33.2*   HGB 11.1*      *   K 3.4*   BUN 8   CREA 0.84   GFRAA >60   GFRNA >60   CA 8.2*       BMI: Body mass index is 27.75 kg/m². ALLERGIES: No Known Allergies    Lines:        Peripheral IV 07/21/21 Right;Proximal Cephalic (Active)   Site Assessment Clean, dry, & intact 07/27/21 0738   Phlebitis Assessment 0 07/27/21 0738   Infiltration Assessment 0 07/27/21 0738   Dressing Status Clean, dry, & intact 07/27/21 0738   Dressing Type Tape;Transparent 07/27/21 0738   Hub Color/Line Status Capped 07/27/21 0738   Action Taken Open ports on tubing capped 07/27/21 0738   Alcohol Cap Used Yes 07/27/21 0738       Peripheral IV 07/21/21 Right;Proximal Basilic (Active)   Site Assessment Clean, dry, & intact 07/27/21 0738   Phlebitis Assessment 0 07/27/21 0738   Infiltration Assessment 0 07/27/21 0738   Dressing Status Clean, dry, & intact 07/27/21 0738   Dressing Type Tape;Transparent 07/27/21 0738   Hub Color/Line Status Capped 07/27/21 0738   Action Taken Open ports on tubing capped 07/27/21 0738   Alcohol Cap Used Yes 07/27/21 0738          History:    Past Medical History:   Diagnosis Date    Drug abuse, IV ([address]     Heroin abuse ([address]     Psychiatric diagnosis     ADHD    Psychiatric diagnosis     drug abuse.  Psychiatric disorder     anxiety     No past surgical history on file.   Patient Active Problem List   Diagnosis Code    Polysubstance abuse ([address] F19.10    Drug overdose, multiple drugs T50.911A    Elevated LFTs R79.89    Overdose of heroin ([address] T40.1X1A    EZEQUIEL (acute kidney injury) ([facility] N17.9    ATN (acute tubular necrosis) (HCC) N17.0    Elevated troponin R77.8    Lactic acidosis E87.2    Encephalopathy G93.40    Acute respiratory failure with hypoxia and hypercapnia (HCC) J96.01, J96.02    Hypokalemia E87.6    ARDS (adult respiratory distress syndrome) (HCC) J80    Aspiration pneumonia (HCC) J69.0    Alcohol abuse F10.10    Hepatitis-C B19.20

## 2021-07-27 NOTE — DISCHARGE INSTRUCTIONS
Cardiac Catheterization/Angiography Discharge Instructions    *Check the puncture site frequently for swelling or bleeding. If you see any bleeding, lie down and apply pressure over the area with a clean town or washcloth. Notify your doctor for any redness, swelling, drainage or oozing from the puncture site. Notify your doctor for any fever or chills. *If the leg or arm with the puncture becomes cold, numb or painful, call Dr Huma Goins at  [phone]    *Activity should be limited for the next 48 hours. Climb stairs as little as possible and avoid any stooping, bending or strenuous activity for 48 hours. No heavy lifting (anything over 10 pounds) for three days. *Do not drive for 48 hours. *You may resume your usual diet. Drink more fluids than usual.    *Have a responsible person drive you home and stay with you for at least 24 hours after your heart catheterization/angiography. *You may remove the bandage from your Right and Arm in 24 hours. You may shower in 24 hours. No tub baths, hot tubs or swimming for one week. Do not place any lotions, creams, powders, ointments over the puncture site for one week. You may place a clean band-aid over the puncture site each day for 5 days. Change this daily. Patient Education        Alcohol Detoxification and Withdrawal: Care Instructions  Your Care Instructions     If you drink alcohol regularly and then suddenly stop, you may go through some physical and emotional problems while the alcohol clears out of your system. Clearing the alcohol from your body is called detoxification, or detox. Physical and emotional problems that may happen during detox are called withdrawal.  Symptoms of withdrawal can be scary and dangerous. Mild symptoms include nausea and vomiting, sweating, shakiness, and intense worry.  Severe symptoms include being confused and irritable, feeling things on your body that are not there, seeing or hearing things that are not there, and trembling. You may even have seizures. If your symptoms become severe you must see a doctor. People who drink large amounts of alcohol should not try to detox at home. A person can die of severe alcohol withdrawal.  Symptoms of alcohol withdrawal may begin from 4 to 12 hours after you stop drinking. But they may not start for several days after the last drink. They can last a few days. It is hard to stop drinking. But when you have cleared the alcohol from your system, you will be able to start the next part of your life, free from the burden of being dependent. Follow-up care is a key part of your treatment and safety. Be sure to make and go to all appointments, and call your doctor if you are having problems. It's also a good idea to know your test results and keep a list of the medicines you take. How can you care for yourself at home? · Before you stop drinking, talk to your doctor about how you plan to stop. Be sure to be completely honest with him or her about how much you have been drinking. Your doctor will figure out whether you need to detox in a supervised medical center. · Take your medicines exactly as prescribed. Call your doctor if you think you are having a problem with your medicine. · Make sure someone you trust is with you the whole time. Have friends and family members take turns staying with you until you are done with detox. · Put a list of emergency numbers near the phone. This should include your doctor, the police, the nearest hospital and emergency room, and neighbors who can help if needed. · Make sure all alcohol is removed from the house before you start. This includes beverages as well as medicines, rubbing alcohol, and certain flavorings like vanilla extract. · Keep \"drinking buddies\" away during the time you are going through detox. · Make your surroundings calm.  Soft lights, soft music, and a comfortable place to sit or lie down can help make the process easier. · Drink lots of fluids and eat snacks such as fruit, cheese and crackers, and pretzels. Foods high in carbohydrate may help reduce the craving for alcohol. · Understand that detox is going to be hard. · Keep in mind that the people watching over you during detox are there to help. Explain to them before you start that you may not act like yourself until detox is finished. · Consider joining a support group such as Alcoholics Anonymous. Sharing your experiences with other people who face similar challenges may help you feel less overwhelmed. · Keep the numbers for these national suicide hotlines: 2-197-197-TALK (4-828.728.3388) and 1-262-WYSBPFX (7-465.208.6300). If you or someone you know talks about suicide or feeling hopeless, get help right away. When should you call for help? Call 911 anytime you think you may need emergency care. For example, call if:    · You feel you cannot stop from hurting yourself or someone else.     · You vomit many times and cannot stop.     · You vomit blood or what looks like coffee grounds.     · You have trouble breathing or are breathing very fast.     · Your heart beats more than 120 times a minute and will not slow down.     · You have chest pain.     · You have a seizure.     · You see or feel things that are not there (hallucinate). If you are caring for someone who is going through detox, call if:    · The person passes out (loses consciousness).     · The person sees or feels things that are not there and sees or hears the same things many times.     · The person is very agitated and will not calm down.     · The person becomes violent or threatens to be violent.     · The person has a seizure. Call your doctor now or seek immediate medical care if:    · You have a high fever.     · You have severe belly pain.     · You are very shaky. Watch closely for changes in your health, and be sure to contact your doctor if:    · You do not get better as expected. Where can you learn more? Go to http://www.RotaBan.com/  Enter D051 in the search box to learn more about \"Alcohol Detoxification and Withdrawal: Care Instructions. \"  Current as of: June 29, 2020               Content Version: 12.8  © 2006-2021 [employer]. Care instructions adapted under license by [employer] (which disclaims liability or warranty for this information). If you have questions about a medical condition or this instruction, always ask your healthcare professional. [place] [address] any warranty or liability for your use of this information. Patient Education        Aspiration Pneumonia: Care Instructions  Your Care Instructions     Aspiration pneumonia is an inflammation of the lungs. It may occur after you breathe in large amounts of foreign material, such as food, liquid, vomit, or mucus. Aspiration may happen because of a health problem that makes it hard to swallow. These problems include stroke or seizure. Pneumonia makes it hard to breathe. Follow-up care is a key part of your treatment and safety. Be sure to make and go to all appointments, and call your doctor if you are having problems. It's also a good idea to know your test results and keep a list of the medicines you take. How can you care for yourself at home? To help with swallowing   · You may need to do exercises to train your muscles to work together to help you swallow. You may also need to learn how to position your body or how to put food in your mouth to be able to swallow better. · You may need to change the foods you eat. Your doctor may tell you to eat certain foods and liquids to make swallowing easier. · You may need to change how you prepare foods. For example, you may need to add thickeners to some liquids, or puree certain foods in a . To help with pneumonia   · Take your antibiotics as directed.  Do not stop taking them just because you feel better. You need to take the full course of antibiotics. · Take your medicines exactly as prescribed. For example, your doctor may have given you medicine that makes breathing easier. Call your doctor if you think you are having a problem with your medicine. · Get plenty of rest and sleep. You may feel weak and tired for a while, but your energy level will improve with time. · Take care of your cough so you can rest. A cough that brings up mucus from your lungs is common with pneumonia. It is one way your body gets rid of the infection. But if coughing keeps you from resting or causes severe fatigue and chest-wall pain, talk to your doctor. He or she may suggest that you take a medicine to reduce the cough. · Use a humidifier to increase the moisture in the air. Dry air makes coughing worse. Follow the instructions for cleaning the machine. · Do not smoke, and avoid others' smoke. Smoke will make your cough last longer. If you need help quitting, talk to your doctor about stop-smoking programs and medicines. These can increase your chances of quitting for good. · Take an over-the-counter pain medicine, such as acetaminophen (Tylenol), ibuprofen (Advil, Motrin), or naproxen (Aleve) to help reduce fever and reduce chest pain caused by coughing. Read and follow all instructions on the label. · Do not take two or more pain medicines at the same time unless the doctor told you to. Many pain medicines have acetaminophen, which is Tylenol. Too much acetaminophen (Tylenol) can be harmful. When should you call for help? Call 911 anytime you think you may need emergency care. For example, call if:    · You have severe trouble breathing. Call your doctor now or seek immediate medical care if:    · You have a new or higher fever.     · You have new or worse trouble breathing.     · You cough up blood.     · You are dizzy or lightheaded, or you feel like you may faint.    Watch closely for changes in your health, and be sure to contact your doctor if:    · You do not get better as expected.     · You are coughing more deeply or more often. Where can you learn more? Go to http://www.CareOne.com/  Enter D757 in the search box to learn more about \"Aspiration Pneumonia: Care Instructions. \"  Current as of: October 26, 2020               Content Version: 12.8  © 2006-2021 [employer]. Care instructions adapted under license by [employer] (which disclaims liability or warranty for this information). If you have questions about a medical condition or this instruction, always ask your healthcare professional. [place] [address] any warranty or liability for your use of this information. Patient Education        Use of Multiple Drugs: Care Instructions  Your Care Instructions     You have had treatment to help your body get rid of a combination of any of these drugs:  · Prescription medicines  · Over-the-counter medicines  · Alcohol  · Illegal drugs  Taking some drugs together may cause a bad reaction. They can have unexpected or stronger effects on your body and mind. For example, benzodiazepines (such as alprazolam and lorazepam) and alcohol both depress the nervous system. Taken together, each one is stronger than when it is taken by itself. You are getting better, but it takes time for the drugs to leave your body. It may take up to 2 weeks for your mind to clear and your mood to improve. Depending on the drugs you took, the doctor might have:  · Watched your symptoms or done tests to find out what drugs were in your body. · Treated you to control your breathing, blood pressure, and heart rate. · Tried to remove the drugs from your body by pumping your stomach or giving you a substance by mouth that absorbs chemicals. · Given you a substance that neutralizes chemicals (antidote). · Given you oxygen to help you breathe.   · Given you fluids. The doctor also watched you carefully to make sure you were recovering safely. Follow-up care is a key part of your treatment and safety. Be sure to make and go to all appointments, and call your doctor if you are having problems. It's also a good idea to know your test results and keep a list of the medicines you take. How can you care for yourself at home? · Adopt healthy habits to ease withdrawal symptoms. When you use substances like alcohol and some drugs regularly, your body gets used to them. This is called physical dependence. If you are physically dependent on substances, you may have withdrawal symptoms when you stop taking them. These symptoms may include trembling, feeling restless, and sweating. To help get past these:  ? Get plenty of rest.  ? Drink lots of fluids. ? Stay active. ? Eat a healthy diet. · Drink fluids to soothe a sore throat. If you had a tube in your throat to help you breathe, you may have a sore throat or hoarseness that can last a few days. Drinking fluids may help. · If you use opioids, ask your doctor or pharmacist about having a naloxone rescue kit on hand. · Get help to stop using drugs. Talk to your doctor about substance use treatment programs. When should you call for help? Call 911 anytime you think you may need emergency care. For example, call if:    · You feel you cannot stop from hurting yourself or someone else. Call your doctor now or seek immediate medical care if:    · You have new or worse symptoms of withdrawal, such as trembling, feeling restless, and sweating, that you can't manage at home. Watch closely for changes in your health, and be sure to contact your doctor if:    · You do not get better as expected.     · You need help finding the right place to get help with drug or alcohol problems. Where can you learn more?   Go to http://www.gray.com/  Enter B109 in the search box to learn more about \"Use of Multiple Drugs: Care Instructions. \"  Current as of: June 29, 2020               Content Version: 12.8  © 1327-9303 [employer]wise, [facility]. Care instructions adapted under license by [employer] (which disclaims liability or warranty for this information). If you have questions about a medical condition or this instruction, always ask your healthcare professional. [place] [address] any warranty or liability for your use of this information.

## 2021-07-27 NOTE — ROUTINE PROCESS
TRANSFER - OUT REPORT:    Verbal report given to Taylor BUTLER(name) on [employer]  being transferred to room 359 on 3N(unit) for routine progression of care       Report consisted of patients Situation, Background, Assessment and   Recommendations(SBAR). Information from the following report(s) SBAR, Kardex, Procedure Summary, Intake/Output, MAR and Cardiac Rhythm NSR was reviewed with the receiving nurse. Lines:   Peripheral IV 07/21/21 Right;Proximal Cephalic (Active)   Site Assessment Clean, dry, & intact 07/27/21 1106   Phlebitis Assessment 0 07/27/21 1106   Infiltration Assessment 0 07/27/21 1106   Dressing Status Clean, dry, & intact 07/27/21 1106   Dressing Type Transparent;Tape 07/27/21 1106   Hub Color/Line Status Green 07/27/21 1106   Action Taken Open ports on tubing capped 07/27/21 0738   Alcohol Cap Used Yes 07/27/21 1106       Peripheral IV 07/21/21 Right;Proximal Basilic (Active)   Site Assessment Clean, dry, & intact 07/27/21 1106   Phlebitis Assessment 0 07/27/21 1106   Infiltration Assessment 0 07/27/21 1106   Dressing Status Clean, dry, & intact 07/27/21 1106   Dressing Type Transparent;Tape 07/27/21 1106   Hub Color/Line Status Green 07/27/21 1106   Action Taken Open ports on tubing capped 07/27/21 0738   Alcohol Cap Used Yes 07/27/21 1106        Opportunity for questions and clarification was provided.       Patient transported with:   Monitor  Registered Nurse

## 2021-07-27 NOTE — ROUTINE PROCESS
Dual AVS reviewed with MIKAYLA Crawley. All medications reviewed individually with patient. Opportunities for questions and concerns provided. Patient discharged via (mode of transport ie. Car, ambulance or air transport) Car  Patient's arm band appropriately discarded.

## 2021-07-27 NOTE — DISCHARGE SUMMARY
Discharge Summary    Patient: Wilbert Ariza MRN: [id number]  CSN: [id number]    YOB: 1985  Age: 30 y.o.   Sex: male    DOA: 7/21/2021 LOS:  LOS: 6 days   Discharge Date:      Primary Care Provider:  Francisco Abbott MD    Admission Diagnoses: ARDS (adult respiratory distress syndrome) ([place] [address] [J80]    Discharge Diagnoses:    Problem List as of 7/27/2021 Date Reviewed: 6/15/2013        Codes Class Noted - Resolved    * (Principal) ARDS (adult respiratory distress syndrome) ([place] [address] ICD-10-CM: J80  ICD-9-CM: 518.52  7/21/2021 - Present        Aspiration pneumonia ([place] [address].) ICD-10-CM: J69.0  ICD-9-CM: 507.0  7/21/2021 - Present        Alcohol abuse ICD-10-CM: F10.10  ICD-9-CM: 305.00  7/21/2021 - Present        Hepatitis-C ICD-10-CM: B19.20  ICD-9-CM: 070.70  7/21/2021 - Present        Hypokalemia ICD-10-CM: E87.6  ICD-9-CM: 276.8  3/16/2016 - Present        Acute respiratory failure with hypoxia and hypercapnia ([place] [address] ICD-10-CM: J96.01, J96.02  ICD-9-CM: 518.81  3/13/2016 - Present        Overdose of heroin ([place] [address] ICD-10-CM: T40.1X1A  ICD-9-CM: 965.01, E980.0  3/10/2016 - Present        EZEQUIEL (acute kidney injury) ([place] [address] ICD-10-CM: N17.9  ICD-9-CM: 584.9  3/10/2016 - Present        ATN (acute tubular necrosis) ([place] [address] ICD-10-CM: N17.0  ICD-9-CM: 584.5  3/10/2016 - Present        Elevated troponin ICD-10-CM: R77.8  ICD-9-CM: 790.6  3/10/2016 - Present        Lactic acidosis ICD-10-CM: E87.2  ICD-9-CM: 276.2  3/10/2016 - Present        Encephalopathy ICD-10-CM: G93.40  ICD-9-CM: 348.30  3/10/2016 - Present        Polysubstance abuse ([facility] [address] ICD-10-CM: F19.10  ICD-9-CM: 305.90  6/15/2013 - Present        Drug overdose, multiple drugs ICD-10-CM: T50.911A  ICD-9-CM: 977.9, E980.5  6/15/2013 - Present        Elevated LFTs ICD-10-CM: R79.89  ICD-9-CM: 790.6  6/15/2013 - Present        RESOLVED: Hyperkalemia ICD-10-CM: E87.5  ICD-9-CM: 276.7  3/10/2016 - 3/16/2016              Discharge Medications:     Current Discharge Medication List      START taking these medications    Details   amoxicillin-clavulanate (Augmentin) 875-125 mg per tablet Take 1 Tablet by mouth every twelve (12) hours. Qty: 14 Tablet, Refills: 0  Start date: 7/27/2021      LORazepam (Ativan) 1 mg tablet Take 1 Tablet by mouth every twelve (12) hours as needed for Anxiety. Max Daily Amount: 2 mg. Qty: 6 Tablet, Refills: 0  Start date: 7/27/2021    Associated Diagnoses: Polysubstance abuse ([facility] [address]         CONTINUE these medications which have NOT CHANGED    Details   amphetamine-dextroamphetamine XR (Adderall XR) 20 mg XR capsule Take 20 mg by mouth three (3) times daily. Discharge Condition: improved    Procedures : [facility]  Femoral Artery Line Procedure Note with US guidance    Cardiac catherization     Consults: Cardiology, Dr. Martin Wasserman  Intensivist/pulmonary, Dr. Kareen Park  /81   Pulse 68   Temp 98.5 °F (36.9 °C)   Resp 18   Ht 5' 8\" (1.727 m)   Wt 82.6 kg (182 lb)   SpO2 97%   BMI 27.67 kg/m²     General: Awake, cooperative, no acute distress    HEENT: NC, Atraumatic. PERRLA, EOMI. Anicteric sclerae. Lungs:  CTA Bilaterally. No Wheezing/Rhonchi/Rales. Heart:  Regular  rhythm,  No murmur, No Rubs, No Gallops  Abdomen: Soft, Non distended, Non tender. +Bowel sounds,   Extremities: No c/c/e  Psych:   Not anxious or agitated. Neurologic:  No acute neurological deficits. Admission HPI : Gilbert Dc is a 30 y.o. male with history of for polysubstance abuse was sent to ER due to unresponsiveness. He was found unresponsive per sister on pouch. EMS was called, he was found hypoxia, total 6 mg Narcan was administrated. He became a little bit respond. He was found hypoxia per EMS, nonrebreathing mask was put on. He become agitated in ER, high flow oxygen was put, but still hypoxia. He was intubated in ER with  nimbex administrated. Chest x-ray indicated bilateral infiltration. troponin x1 within normal limit. Creatinine 1.8. White count 13.5. CT head pending. Sepsis protocol was  started in ER, lactic acid was 9. Broad coverage IV antibiotics was administrated. Intensivist was consulted. Hospital Course : Hailey Bergeron a 30 y. o. male with history of for polysubstance abuse was found unresponsive.  He was intubated in ER due to severe hypoxia.     Acute respiratory failure with hypoxia-  Intubated in the ER on 7/21/2021  Dated 7/23/2021  ARDS and aspiration pneumonia  Now stable on room air  Has been on IV Zosyn, will change to Augmentin for 7-day course     Coronary artery disease -  History of cardiomyopathy but now EF has normalized  Abnormal Lexiscan nuclear stress test  Complicating conditions of polysubstance abuse and tobacco abuse  Catheterization done today, no significant obstructive coronary artery disease found  Further cardiac testing recommended     Tachycardia, improved  EtOH withdrawal  Acute encephalopathy -resolved  Polysubstance abuse, alcohol abuse and tobacco abuse  UDS positive for amphetamines and cocaine  During this admission agreed to enroll in drug rehab program  Today asked for Ativan to be discharged home on as he said it helped him relax, agreed to give him a minimal dose     Activity: as tolerated    Diet: cardiac     Follow-up: PCP in one week    Disposition: home    Minutes spent on discharge: 45 minutes      Labs: Results:       Chemistry Recent Labs     07/26/21  0525 07/25/21  0345   * 109*   * 145   K 3.4* 3.4*   * 113*   CO2 26 27   BUN 8 9   CREA 0.84 0.77   CA 8.2* 8.3*   AGAP 6 5   BUCR 10* 12   AP 47 56   TP 5.2* 5.4*   ALB 2.6* 2.7*   GLOB 2.6 2.7   AGRAT 1.0 1.0      CBC w/Diff Recent Labs     07/26/21  0525 07/25/21  0345   WBC 5.2 5.6   RBC 3.75* 3.86*   HGB 11.1* 11.6*   HCT 33.2* 34.1*    155   GRANS 62 73   LYMPH 24 17*   EOS 3 1      Cardiac Enzymes No results for input(s): CPK, CKND1, BUD in the last 72 hours. No lab exists for component: CKRMB, TROIP   Coagulation No results for input(s): PTP, INR, APTT, INREXT in the last 72 hours. Lipid Panel No results found for: CHOL, CHOLPOCT, CHOLX, CHLST, CHOLV, 574575, HDL, HDLP, LDL, LDLC, DLDLP, 880574, VLDLC, VLDL, TGLX, TRIGL, TRIGP, TGLPOCT, CHHD, CHHDX   BNP No results for input(s): BNPP in the last 72 hours. Liver Enzymes Recent Labs     07/26/21  0525   TP 5.2*   ALB 2.6*   AP 47      Thyroid Studies Lab Results   Component Value Date/Time    TSH 1.49 03/11/2016 02:55 AM            Significant Diagnostic Studies: XR ABD (KUB)    Result Date: 7/21/2021  EXAM: SUPINE ABDOMINAL RADIOGRAPH CLINICAL INDICATION/HISTORY: OG verification -Additional: None COMPARISON: None TECHNIQUE: Frontal views of the abdomen. _______________ FINDINGS: BOWEL GAS PATTERN: No evidence of obstruction. No visible free air, given limitations of supine view. Enteric tube within the stomach. Large amount stool throughout the colon. BONES: Unremarkable. OTHER: None. _______________     Enteric tube within the stomach. CT HEAD WO CONT    Result Date: 7/21/2021  EXAM: CT head INDICATION: Altered mental status. COMPARISON: None. TECHNIQUE: Axial CT imaging of the head was performed without intravenous contrast. Standard multiplanar coronal and sagittal reformatted images were obtained and are included in interpretation. One or more dose reduction techniques were used on this CT: automated exposure control, adjustment of the mAs and/or kVp according to patient size, and iterative reconstruction techniques. The specific techniques used on this CT exam have been documented in the patient's electronic medical record.   Digital Imaging and Communications in Medicine (DICOM) format image data are available to nonaffiliated external healthcare facilities or entities on a secure, media free, reciprocally searchable basis with patient authorization for at least a 12-month period after this study. _______________ FINDINGS: BRAIN AND POSTERIOR FOSSA: Brain EXTRA-AXIAL SPACES AND MENINGES: There are no abnormal extra-axial fluid collections. CALVARIUM: Intact. SINUSES: Right maxillary sinus mucosal thickening. OTHER: Fluid secretions within the posterior nasopharynx. _______________     No acute intracranial abnormality. Right maxillary sinus mucosal thickening. Fluid secretions within the posterior nasopharynx. XR CHEST PORT    Result Date: 7/24/2021  EXAM: CHEST RADIOGRAPH CLINICAL INDICATION/HISTORY: Pneumonia   > Additional: None COMPARISON: 7/23/2021 TECHNIQUE: Portable frontal view of the chest _______________ FINDINGS: SUPPORT DEVICES: Endotracheal tube and NG/OG tube have been removed. HEART AND MEDIASTINUM: Heart size is normal. LUNGS AND PLEURAL SPACES: Persistent patchy bilateral pulmonary opacities which are similar to the prior exam. No new focal consolidation, effusion, or pneumothorax. BONES AND SOFT TISSUES: Unremarkable. _______________     Status post extubation with persistent patchy bilateral pulmonary opacities which are similar to the prior exam. No new focal consolidation. XR CHEST PORT    Result Date: 7/23/2021  EXAM: XR CHEST PORT CLINICAL INDICATION/HISTORY: intubated -Additional: None COMPARISON: July 22, 2021 TECHNIQUE: Portable frontal view of the chest _______________ FINDINGS: SUPPORT DEVICES: Endotracheal tube and visualized nasogastric tube both project in stable position. HEART AND MEDIASTINUM: Stable, normal cardiac size and mediastinal contours LUNGS AND PLEURAL SPACES: Patchy bilateral airspace disease with perihilar predominance again noted. Appearance is similar allowing for slightly diminished lung volumes on this morning's exam. No pneumothorax or pleural effusion. BONY THORAX AND SOFT TISSUES: Unremarkable. _______________     1. Support devices in stable/appropriate position as visualized.  2. Patchy bilateral perihilar predominant airspace disease, unchanged. XR CHEST PORT    Result Date: 7/22/2021  EXAM: XR CHEST PORT CLINICAL INDICATION/HISTORY: intubated -Additional: None COMPARISON: 1 day prior TECHNIQUE: Portable frontal view of the chest _______________ FINDINGS: SUPPORT DEVICES: Endotracheal tube in the midthoracic trachea. Morteza Olden HEART AND MEDIASTINUM: Cardiomediastinal silhouette within normal limits. LUNGS AND PLEURAL SPACES: Improving bilateral pulmonary infiltrates with faint residual bilateral opacities. No large effusion or pneumothorax. _______________     Improving bilateral pulmonary infiltrates with faint residual bilateral opacities. XR CHEST PORT    Result Date: 7/21/2021  EXAM: XR CHEST PORT CLINICAL INDICATION/HISTORY: meets SIRS criteria -Additional: None COMPARISON: 12/20/2017 TECHNIQUE: Portable frontal view of the chest _______________ FINDINGS: SUPPORT DEVICES: Endotracheal tube in the midthoracic trachea. Morteza Olden HEART AND MEDIASTINUM: Cardiomediastinal silhouette within normal limits. LUNGS AND PLEURAL SPACES: Extensive bilateral parenchymal infiltrates. No large effusion or pneumothorax. _______________     Extensive bilateral parenchymal infiltrates. ECHO ADULT COMPLETE    Result Date: 7/21/2021  · Echo study was technically difficulty and limited due to patient's condition. Image quality for this study was suboptimal. Contrast used: DEFINITY. · LV: Estimated LVEF is 15 - 20%. Normal cavity size and wall thickness. Severely reduced systolic function. Unable to assess diastolic function due to elevated heart rate. · RV: Moderately dilated right ventricle. Reduced systolic function. Assessment of RV function: TAPSE= 9mm. · TV: Normal valve structure and no stenosis. Tricuspid regurgitation is inadequate for estimation of right ventricular systolic pressure. · IVC: Mechanically ventilated; cannot use inferior caval vein diameter to estimate central venous pressure.       ECHO ADULT FOLLOW-UP OR LIMITED    Result Date: 7/27/2021  · LV: Estimated LVEF is 55 - 60%. Normal cavity size, wall thickness, systolic function (ejection fraction normal) and diastolic function. E/E' ratio is 4.66. · TV: Normal valve structure and no stenosis. Tricuspid regurgitation is inadequate for estimation of right ventricular systolic pressure. CARDIAC PROCEDURE    Result Date: 7/27/2021  Patent coronary arteries with mild luminal irregularities. Mid to distal LAD is small caliber vessel. LVEDP 4 mmHg. Discussed with patient and mother. Continue aggressive lifestyle changes and risk factor management. NUCLEAR CARDIAC STRESS TEST    Result Date: 7/26/2021  · Sinus rhythm with nonspecific T-wave changes. · Stress test: Negative stress test. Exercise stress test: EKG stress test results correlate with an intermediate risk of inducible myocardial ischemia. · SPECT: Left ventricular function post-stress was normal. Calculated ejection fraction is 61%. There is no evidence of transient ischemic dilation (TID). The TID ratio is 1. 13. · SPECT: Myocardial perfusion imaging defect 1: caused by soft tissue. · SPECT: Left ventricular perfusion is abnormal. Myocardial perfusion imaging supports an intermediate risk stress test.          No results found for this or any previous visit.         CC: Usama Montiel MD

## 2021-07-27 NOTE — PROGRESS NOTES
Problem: Falls - Risk of  Goal: *Absence of Falls  Description: Document Jatin Sites Fall Risk and appropriate interventions in the flowsheet. Outcome: Progressing Towards Goal  Note: Fall Risk Interventions:       Mentation Interventions: Adequate sleep, hydration, pain control, Door open when patient unattended, More frequent rounding    Medication Interventions: Patient to call before getting OOB, Teach patient to arise slowly    Elimination Interventions: Call light in reach, Patient to call for help with toileting needs    History of Falls Interventions: Room close to nurse's station         Problem: Patient Education: Go to Patient Education Activity  Goal: Patient/Family Education  Outcome: Progressing Towards Goal     Problem: Pressure Injury - Risk of  Goal: *Prevention of pressure injury  Description: Document Kamron Scale and appropriate interventions in the flowsheet.   Outcome: Progressing Towards Goal  Note: Pressure Injury Interventions:  Sensory Interventions: Assess changes in LOC, Minimize linen layers, Pressure redistribution bed/mattress (bed type)    Moisture Interventions: Absorbent underpads, Minimize layers    Activity Interventions: Pressure redistribution bed/mattress(bed type), PT/OT evaluation    Mobility Interventions: Pressure redistribution bed/mattress (bed type), PT/OT evaluation    Nutrition Interventions: Document food/fluid/supplement intake    Friction and Shear Interventions: Apply protective barrier, creams and emollients, Minimize layers                Problem: Patient Education: Go to Patient Education Activity  Goal: Patient/Family Education  Outcome: Progressing Towards Goal     Problem: Patient Education: Go to Patient Education Activity  Goal: Patient/Family Education  Outcome: Progressing Towards Goal

## 2021-07-27 NOTE — PROGRESS NOTES
Bedside and Verbal shift change report given to Lesli Ramirez RN (oncoming nurse) by Anne-Marie Perez RN (offgoing nurse). Report included the following information SBAR, Kardex, ED Summary, Intake/Output, MAR, Recent Results, Med Rec Status and Cardiac Rhythm NSR.     5383 Patient was very anxious this evening. He stated that it was due to the news the doctor gave him. His CIWA score was rising the whole night. I checked on him often to make sure he was ok and checked his CIWA score often. Patient was given the appropriate medication based on his score and it was documented. I also rechecked his score an hour after I gave the medication. Patient is resting comfortable. Bedside and Verbal shift change report given to [facility], RN (oncoming nurse) by Lesli Ramirez RN (offgoing nurse). Report included the following information SBAR, Kardex, ED Summary, Intake/Output, MAR, Recent Results, Med Rec Status and Cardiac Rhythm NSR.

## 2021-07-27 NOTE — PROGRESS NOTES
DC Plan:  Rehab facility for drug rehab    Patient has been in the cath lab for most of the day; care manager was contacted by the [facility] and asked to fax patient info to [phone]; this has been completed along with the cath report; will follow up with [facility] in the a.m. regarding possible admission. Care manager also met with patient's mother [facility] [phone] - care manager contacted [facility] to verify that they had received fax and also informed them that patient will be discharging home this evening.

## 2021-07-27 NOTE — PROGRESS NOTES
Problem: Falls - Risk of  Goal: *Absence of Falls  Description: Document Lucy Locket Fall Risk and appropriate interventions in the flowsheet. Outcome: Progressing Towards Goal  Note: Fall Risk Interventions:       Mentation Interventions: Adequate sleep, hydration, pain control, Reorient patient, Door open when patient unattended    Medication Interventions: Assess postural VS orthostatic hypotension, Patient to call before getting OOB, Teach patient to arise slowly    Elimination Interventions: Call light in reach, Patient to call for help with toileting needs    History of Falls Interventions: Bed/chair exit alarm, Door open when patient unattended         Problem: Patient Education: Go to Patient Education Activity  Goal: Patient/Family Education  Outcome: Progressing Towards Goal     Problem: Pressure Injury - Risk of  Goal: *Prevention of pressure injury  Description: Document Kamron Scale and appropriate interventions in the flowsheet.   Outcome: Progressing Towards Goal  Note: Pressure Injury Interventions:  Sensory Interventions: Assess changes in LOC, Keep linens dry and wrinkle-free, Minimize linen layers, Monitor skin under medical devices    Moisture Interventions: Absorbent underpads, Apply protective barrier, creams and emollients, Minimize layers, Moisture barrier    Activity Interventions: Increase time out of bed, Pressure redistribution bed/mattress(bed type), PT/OT evaluation    Mobility Interventions: HOB 30 degrees or less    Nutrition Interventions: Document food/fluid/supplement intake, Offer support with meals,snacks and hydration    Friction and Shear Interventions: Apply protective barrier, creams and emollients, HOB 30 degrees or less                Problem: Patient Education: Go to Patient Education Activity  Goal: Patient/Family Education  Outcome: Progressing Towards Goal     Problem: Patient Education: Go to Patient Education Activity  Goal: Patient/Family Education  Outcome: Progressing Towards Goal

## 2021-07-28 LAB — CRD SYSTOLIC BP: 124

## 2021-07-28 NOTE — PROGRESS NOTES
7/28/21 1041 - [address] Johnnie Scott called with additional questions; we will fax last 72 hrs labs, vitals, MAR and AVS to [phone].

## 2022-01-25 NOTE — PROGRESS NOTES
Bedside and Verbal shift change report given to Pilo Chase RN (oncoming nurse) by Angelica Cabrales RN (offgoing nurse). Report included the following information SBAR, Kardex, ED Summary, Intake/Output, MAR, Recent Results, Med Rec Status and Cardiac Rhythm NSR.     2018  Shift assessment complete  Pt resting quietly with family at bediside - eyes open and chest rising and falling evenly   Pt c/o generalized pain 7/10 - prn tylenol given   Pt requested a sleep aid - paged hospitalist and received order for melatonin     [facility] [place] Cardiac/Medical Night Shift Chart Audit    Chart Audit completed? YES      Bedside and Verbal shift change report given to Radha Bond RN (oncoming nurse) by Pilo Chase RN (offgoing nurse). Report included the following information SBAR, Kardex, ED Summary, Intake/Output, MAR, Recent Results, Med Rec Status and Cardiac Rhythm NSR. Trilobed Flap Text: The defect edges were debeveled with a #15 scalpel blade.  Given the location of the defect and the proximity to free margins a trilobed flap was deemed most appropriate.  Using a sterile surgical marker, an appropriate trilobed flap drawn around the defect.    The area thus outlined was incised deep to adipose tissue with a #15 scalpel blade.  The skin margins were undermined to an appropriate distance in all directions utilizing iris scissors.

## 2022-03-18 PROBLEM — J80 ARDS (ADULT RESPIRATORY DISTRESS SYNDROME) (HCC): Status: ACTIVE | Noted: 2021-07-21

## 2022-03-19 PROBLEM — B19.20 HEPATITIS-C: Status: ACTIVE | Noted: 2021-07-21

## 2022-03-19 PROBLEM — J69.0 ASPIRATION PNEUMONIA (HCC): Status: ACTIVE | Noted: 2021-07-21

## 2022-03-20 PROBLEM — F10.10 ALCOHOL ABUSE: Status: ACTIVE | Noted: 2021-07-21

## 2022-04-26 ENCOUNTER — HOSPITAL ENCOUNTER (INPATIENT)
Age: 37
LOS: 4 days | Discharge: LEFT BEFORE DISCHARGE | DRG: 603 | End: 2022-04-30
Attending: EMERGENCY MEDICINE | Admitting: FAMILY MEDICINE
Payer: COMMERCIAL

## 2022-04-26 ENCOUNTER — APPOINTMENT (OUTPATIENT)
Dept: CT IMAGING | Age: 37
DRG: 603 | End: 2022-04-26
Attending: EMERGENCY MEDICINE
Payer: COMMERCIAL

## 2022-04-26 ENCOUNTER — APPOINTMENT (OUTPATIENT)
Dept: GENERAL RADIOLOGY | Age: 37
DRG: 603 | End: 2022-04-26
Attending: EMERGENCY MEDICINE
Payer: COMMERCIAL

## 2022-04-26 DIAGNOSIS — L03.113 CELLULITIS OF RIGHT UPPER ARM: Primary | ICD-10-CM

## 2022-04-26 PROBLEM — L03.90 CELLULITIS: Status: ACTIVE | Noted: 2022-04-26

## 2022-04-26 LAB
ALBUMIN SERPL-MCNC: 4.1 G/DL (ref 3.4–5)
ALBUMIN/GLOB SERPL: 1.2 {RATIO} (ref 0.8–1.7)
ALP SERPL-CCNC: 86 U/L (ref 45–117)
ALT SERPL-CCNC: 23 U/L (ref 16–61)
AMPHET UR QL SCN: POSITIVE
ANION GAP SERPL CALC-SCNC: 5 MMOL/L (ref 3–18)
APPEARANCE UR: CLEAR
APTT PPP: 38.3 SEC (ref 23–36.4)
AST SERPL-CCNC: 19 U/L (ref 10–38)
BACTERIA URNS QL MICRO: ABNORMAL /HPF
BARBITURATES UR QL SCN: NEGATIVE
BASOPHILS # BLD: 0 K/UL (ref 0–0.1)
BASOPHILS NFR BLD: 0 % (ref 0–2)
BENZODIAZ UR QL: NEGATIVE
BILIRUB SERPL-MCNC: 0.6 MG/DL (ref 0.2–1)
BILIRUB UR QL: NEGATIVE
BUN SERPL-MCNC: 18 MG/DL (ref 7–18)
BUN/CREAT SERPL: 23 (ref 12–20)
CALCIUM SERPL-MCNC: 8.7 MG/DL (ref 8.5–10.1)
CANNABINOIDS UR QL SCN: NEGATIVE
CHLORIDE SERPL-SCNC: 104 MMOL/L (ref 100–111)
CO2 SERPL-SCNC: 27 MMOL/L (ref 21–32)
COCAINE UR QL SCN: POSITIVE
COLOR UR: ABNORMAL
CREAT SERPL-MCNC: 0.8 MG/DL (ref 0.6–1.3)
DIFFERENTIAL METHOD BLD: ABNORMAL
EOSINOPHIL # BLD: 0.3 K/UL (ref 0–0.4)
EOSINOPHIL NFR BLD: 3 % (ref 0–5)
EPITH CASTS URNS QL MICRO: ABNORMAL /LPF (ref 0–5)
ERYTHROCYTE [DISTWIDTH] IN BLOOD BY AUTOMATED COUNT: 12.5 % (ref 11.6–14.5)
ETHANOL SERPL-MCNC: <3 MG/DL (ref 0–3)
GLOBULIN SER CALC-MCNC: 3.5 G/DL (ref 2–4)
GLUCOSE SERPL-MCNC: 80 MG/DL (ref 74–99)
GLUCOSE UR STRIP.AUTO-MCNC: NEGATIVE MG/DL
HCT VFR BLD AUTO: 37.6 % (ref 36–48)
HDSCOM,HDSCOM: ABNORMAL
HGB BLD-MCNC: 12.3 G/DL (ref 13–16)
HGB UR QL STRIP: NEGATIVE
HYALINE CASTS URNS QL MICRO: ABNORMAL /LPF (ref 0–2)
IMM GRANULOCYTES # BLD AUTO: 0 K/UL (ref 0–0.04)
IMM GRANULOCYTES NFR BLD AUTO: 0 % (ref 0–0.5)
INR PPP: 1.2 (ref 0.8–1.2)
KETONES UR QL STRIP.AUTO: ABNORMAL MG/DL
LACTATE BLD-SCNC: 1.02 MMOL/L (ref 0.4–2)
LEUKOCYTE ESTERASE UR QL STRIP.AUTO: NEGATIVE
LYMPHOCYTES # BLD: 1.3 K/UL (ref 0.9–3.6)
LYMPHOCYTES NFR BLD: 16 % (ref 21–52)
MCH RBC QN AUTO: 28.6 PG (ref 24–34)
MCHC RBC AUTO-ENTMCNC: 32.7 G/DL (ref 31–37)
MCV RBC AUTO: 87.4 FL (ref 78–100)
METHADONE UR QL: POSITIVE
MONOCYTES # BLD: 0.7 K/UL (ref 0.05–1.2)
MONOCYTES NFR BLD: 9 % (ref 3–10)
MUCOUS THREADS URNS QL MICRO: ABNORMAL /LPF
NEUTS SEG # BLD: 5.7 K/UL (ref 1.8–8)
NEUTS SEG NFR BLD: 72 % (ref 40–73)
NITRITE UR QL STRIP.AUTO: NEGATIVE
NRBC # BLD: 0 K/UL (ref 0–0.01)
NRBC BLD-RTO: 0 PER 100 WBC
OPIATES UR QL: POSITIVE
PCP UR QL: NEGATIVE
PH UR STRIP: 5 [PH] (ref 5–8)
PLATELET # BLD AUTO: 185 K/UL (ref 135–420)
PMV BLD AUTO: 10.3 FL (ref 9.2–11.8)
POTASSIUM SERPL-SCNC: 3.7 MMOL/L (ref 3.5–5.5)
PROT SERPL-MCNC: 7.6 G/DL (ref 6.4–8.2)
PROT UR STRIP-MCNC: 30 MG/DL
PROTHROMBIN TIME: 14.5 SEC (ref 11.5–15.2)
RBC # BLD AUTO: 4.3 M/UL (ref 4.35–5.65)
RBC #/AREA URNS HPF: ABNORMAL /HPF (ref 0–5)
SODIUM SERPL-SCNC: 136 MMOL/L (ref 136–145)
SP GR UR REFRACTOMETRY: >1.03 (ref 1–1.03)
UROBILINOGEN UR QL STRIP.AUTO: 1 EU/DL (ref 0.2–1)
WBC # BLD AUTO: 8 K/UL (ref 4.6–13.2)
WBC URNS QL MICRO: ABNORMAL /HPF (ref 0–5)

## 2022-04-26 PROCEDURE — 81001 URINALYSIS AUTO W/SCOPE: CPT

## 2022-04-26 PROCEDURE — 73590 X-RAY EXAM OF LOWER LEG: CPT

## 2022-04-26 PROCEDURE — 74011000636 HC RX REV CODE- 636: Performed by: EMERGENCY MEDICINE

## 2022-04-26 PROCEDURE — 80053 COMPREHEN METABOLIC PANEL: CPT

## 2022-04-26 PROCEDURE — 96366 THER/PROPH/DIAG IV INF ADDON: CPT

## 2022-04-26 PROCEDURE — 85610 PROTHROMBIN TIME: CPT

## 2022-04-26 PROCEDURE — 74011000258 HC RX REV CODE- 258: Performed by: EMERGENCY MEDICINE

## 2022-04-26 PROCEDURE — 99285 EMERGENCY DEPT VISIT HI MDM: CPT

## 2022-04-26 PROCEDURE — 96365 THER/PROPH/DIAG IV INF INIT: CPT

## 2022-04-26 PROCEDURE — 83605 ASSAY OF LACTIC ACID: CPT

## 2022-04-26 PROCEDURE — 80307 DRUG TEST PRSMV CHEM ANLYZR: CPT

## 2022-04-26 PROCEDURE — 73080 X-RAY EXAM OF ELBOW: CPT

## 2022-04-26 PROCEDURE — 86900 BLOOD TYPING SEROLOGIC ABO: CPT

## 2022-04-26 PROCEDURE — 96367 TX/PROPH/DG ADDL SEQ IV INF: CPT

## 2022-04-26 PROCEDURE — 74011000250 HC RX REV CODE- 250: Performed by: FAMILY MEDICINE

## 2022-04-26 PROCEDURE — 71045 X-RAY EXAM CHEST 1 VIEW: CPT

## 2022-04-26 PROCEDURE — 87040 BLOOD CULTURE FOR BACTERIA: CPT

## 2022-04-26 PROCEDURE — 74011250636 HC RX REV CODE- 250/636: Performed by: EMERGENCY MEDICINE

## 2022-04-26 PROCEDURE — 85730 THROMBOPLASTIN TIME PARTIAL: CPT

## 2022-04-26 PROCEDURE — 85025 COMPLETE CBC W/AUTO DIFF WBC: CPT

## 2022-04-26 PROCEDURE — 65270000029 HC RM PRIVATE

## 2022-04-26 PROCEDURE — 73201 CT UPPER EXTREMITY W/DYE: CPT

## 2022-04-26 PROCEDURE — 82077 ASSAY SPEC XCP UR&BREATH IA: CPT

## 2022-04-26 RX ORDER — VANCOMYCIN 2 GRAM/500 ML IN 0.9 % SODIUM CHLORIDE INTRAVENOUS
2000 ONCE
Status: COMPLETED | OUTPATIENT
Start: 2022-04-26 | End: 2022-04-27

## 2022-04-26 RX ORDER — CLINDAMYCIN PHOSPHATE 600 MG/50ML
600 INJECTION, SOLUTION INTRAVENOUS EVERY 8 HOURS
Status: DISCONTINUED | OUTPATIENT
Start: 2022-04-26 | End: 2022-04-26

## 2022-04-26 RX ORDER — ONDANSETRON 4 MG/1
4 TABLET, ORALLY DISINTEGRATING ORAL
Status: DISCONTINUED | OUTPATIENT
Start: 2022-04-26 | End: 2022-04-30 | Stop reason: HOSPADM

## 2022-04-26 RX ORDER — FAMOTIDINE 20 MG/1
20 TABLET, FILM COATED ORAL 2 TIMES DAILY
Status: DISCONTINUED | OUTPATIENT
Start: 2022-04-27 | End: 2022-04-30 | Stop reason: HOSPADM

## 2022-04-26 RX ORDER — ACETAMINOPHEN 325 MG/1
650 TABLET ORAL
Status: DISCONTINUED | OUTPATIENT
Start: 2022-04-26 | End: 2022-04-26

## 2022-04-26 RX ORDER — HYDROMORPHONE HYDROCHLORIDE 1 MG/ML
1 INJECTION, SOLUTION INTRAMUSCULAR; INTRAVENOUS; SUBCUTANEOUS
Status: DISCONTINUED | OUTPATIENT
Start: 2022-04-26 | End: 2022-04-27

## 2022-04-26 RX ORDER — SODIUM CHLORIDE 0.9 % (FLUSH) 0.9 %
5-10 SYRINGE (ML) INJECTION AS NEEDED
Status: DISCONTINUED | OUTPATIENT
Start: 2022-04-26 | End: 2022-04-30 | Stop reason: HOSPADM

## 2022-04-26 RX ORDER — OXYCODONE HYDROCHLORIDE 5 MG/1
10 TABLET ORAL
Status: DISCONTINUED | OUTPATIENT
Start: 2022-04-26 | End: 2022-04-29

## 2022-04-26 RX ORDER — ENOXAPARIN SODIUM 100 MG/ML
40 INJECTION SUBCUTANEOUS DAILY
Status: DISCONTINUED | OUTPATIENT
Start: 2022-04-27 | End: 2022-04-30 | Stop reason: HOSPADM

## 2022-04-26 RX ORDER — SODIUM CHLORIDE 0.9 % (FLUSH) 0.9 %
5-40 SYRINGE (ML) INJECTION AS NEEDED
Status: DISCONTINUED | OUTPATIENT
Start: 2022-04-26 | End: 2022-04-30 | Stop reason: HOSPADM

## 2022-04-26 RX ORDER — ACETAMINOPHEN 650 MG/1
650 SUPPOSITORY RECTAL
Status: DISCONTINUED | OUTPATIENT
Start: 2022-04-26 | End: 2022-04-26

## 2022-04-26 RX ORDER — VANCOMYCIN HYDROCHLORIDE
1250 EVERY 12 HOURS
Status: DISCONTINUED | OUTPATIENT
Start: 2022-04-27 | End: 2022-04-28

## 2022-04-26 RX ORDER — ACETAMINOPHEN 500 MG
1000 TABLET ORAL
Status: DISCONTINUED | OUTPATIENT
Start: 2022-04-26 | End: 2022-04-30 | Stop reason: HOSPADM

## 2022-04-26 RX ORDER — ONDANSETRON 2 MG/ML
4 INJECTION INTRAMUSCULAR; INTRAVENOUS
Status: DISCONTINUED | OUTPATIENT
Start: 2022-04-26 | End: 2022-04-30 | Stop reason: HOSPADM

## 2022-04-26 RX ORDER — IBUPROFEN 200 MG
1 TABLET ORAL ONCE
Status: COMPLETED | OUTPATIENT
Start: 2022-04-26 | End: 2022-04-28

## 2022-04-26 RX ORDER — SODIUM CHLORIDE 0.9 % (FLUSH) 0.9 %
5-40 SYRINGE (ML) INJECTION EVERY 8 HOURS
Status: DISCONTINUED | OUTPATIENT
Start: 2022-04-26 | End: 2022-04-30 | Stop reason: HOSPADM

## 2022-04-26 RX ORDER — POLYETHYLENE GLYCOL 3350 17 G/17G
17 POWDER, FOR SOLUTION ORAL DAILY PRN
Status: DISCONTINUED | OUTPATIENT
Start: 2022-04-26 | End: 2022-04-30 | Stop reason: HOSPADM

## 2022-04-26 RX ORDER — SODIUM CHLORIDE, SODIUM LACTATE, POTASSIUM CHLORIDE, CALCIUM CHLORIDE 600; 310; 30; 20 MG/100ML; MG/100ML; MG/100ML; MG/100ML
100 INJECTION, SOLUTION INTRAVENOUS CONTINUOUS
Status: DISCONTINUED | OUTPATIENT
Start: 2022-04-26 | End: 2022-04-29

## 2022-04-26 RX ORDER — MORPHINE SULFATE 4 MG/ML
8 INJECTION INTRAVENOUS
Status: COMPLETED | OUTPATIENT
Start: 2022-04-26 | End: 2022-04-27

## 2022-04-26 RX ORDER — IBUPROFEN 200 MG
1 TABLET ORAL DAILY
Status: DISCONTINUED | OUTPATIENT
Start: 2022-04-27 | End: 2022-04-26

## 2022-04-26 RX ADMIN — VANCOMYCIN HYDROCHLORIDE 2000 MG: 10 INJECTION, POWDER, LYOPHILIZED, FOR SOLUTION INTRAVENOUS at 21:04

## 2022-04-26 RX ADMIN — SODIUM CHLORIDE, PRESERVATIVE FREE 10 ML: 5 INJECTION INTRAVENOUS at 23:29

## 2022-04-26 RX ADMIN — CLINDAMYCIN PHOSPHATE 600 MG: 600 INJECTION, SOLUTION INTRAVENOUS at 20:07

## 2022-04-26 RX ADMIN — SODIUM CHLORIDE 1000 ML: 9 INJECTION, SOLUTION INTRAVENOUS at 19:15

## 2022-04-26 RX ADMIN — PIPERACILLIN AND TAZOBACTAM 4.5 G: 4; .5 INJECTION, POWDER, FOR SOLUTION INTRAVENOUS at 20:32

## 2022-04-26 RX ADMIN — IOPAMIDOL 100 ML: 612 INJECTION, SOLUTION INTRAVENOUS at 21:26

## 2022-04-26 NOTE — ED TRIAGE NOTES
Patient states \" I tried to give myself an IV early this morning because I was withdrawing. \"  C/o right arm pain and swelling.

## 2022-04-26 NOTE — ED PROVIDER NOTES
EMERGENCY DEPARTMENT HISTORY AND PHYSICAL EXAM    Date: 4/26/2022  Patient Name: Lacy Hook    History of Presenting Illness     Chief Complaint   Patient presents with    Arm Pain         History Provided By: Patient    6:55 PM  Lacy Hook is a 29 y.o. male with PMHX of polysubstance abuse who presents to the emergency department C/O pain in right arm. Patient reports he injects both heroin and cocaine. He tried to start an IV on himself to administer IV fluids he had at home last night and reports after this his right arm \"blew up. \"  He denies fever, chills, CP, SOB. Pain is worse with palpation. No relieving factors identified.      PCP: Monserrat Fabian MD    Current Facility-Administered Medications   Medication Dose Route Frequency Provider Last Rate Last Admin    sodium chloride (NS) flush 5-10 mL  5-10 mL IntraVENous PRN Edson Gerard MD        Vancomycin - Pharmacy to dose   1 Each Other Rx Dosing/Monitoring Edson Gerard MD Rosalene Locke [START ON 4/27/2022] vancomycin (VANCOCIN) 1250 mg in  ml infusion  1,250 mg IntraVENous Q12H Edson Gerard MD        morphine injection 8 mg  8 mg IntraVENous NOW Edson Gerard MD        nicotine (NICODERM CQ) 21 mg/24 hr patch 1 Patch  1 Patch TransDERmal ONCE Edson Gerard MD        sodium chloride (NS) flush 5-40 mL  5-40 mL IntraVENous Q8H Yady Cam MD        sodium chloride (NS) flush 5-40 mL  5-40 mL IntraVENous PRN Yady Cam MD        polyethylene glycol (MIRALAX) packet 17 g  17 g Oral DAILY PRN Yady Cam MD        ondansetron (ZOFRAN ODT) tablet 4 mg  4 mg Oral Q8H PRN Yady Cam MD        Or    ondansetron [facility]) injection 4 mg  4 mg IntraVENous Q6H PRN Yady Cam MD        [START ON 4/27/2022] enoxaparin (LOVENOX) injection 40 mg  40 mg SubCUTAneous DAILY Yady Cam MD        [START ON 4/27/2022] famotidine (PEPCID) tablet 20 mg  20 mg Oral BID Horald Schilder, MD        lactated Ringers infusion  100 mL/hr IntraVENous CONTINUOUS Horald Schilder, MD        [START ON 4/27/2022] piperacillin-tazobactam (ZOSYN) 3.375 g in 0.9% sodium chloride (MBP/ADV) 100 mL MBP  3.375 g IntraVENous Q6H Horald Schilder, MD        oxyCODONE IR (ROXICODONE) tablet 10 mg  10 mg Oral Q6H PRN Horald Schilder, MD        HYDROmorphone (DILAUDID) injection 1 mg  1 mg IntraVENous QID PRN Horald Schilder, MD        acetaminophen (TYLENOL) tablet 1,000 mg  1,000 mg Oral Q8H PRN Horald Schilder, MD         Current Outpatient Medications   Medication Sig Dispense Refill    amoxicillin-clavulanate (Augmentin) 875-125 mg per tablet Take 1 Tablet by mouth every twelve (12) hours. 14 Tablet 0    LORazepam (Ativan) 1 mg tablet Take 1 Tablet by mouth every twelve (12) hours as needed for Anxiety. Max Daily Amount: 2 mg. 6 Tablet 0    amphetamine-dextroamphetamine XR (Adderall XR) 20 mg XR capsule Take 20 mg by mouth three (3) times daily. Past History       Past Medical History:  Past Medical History:   Diagnosis Date    [facility] abuse, [facility] [address] [facility]     Heroin abuse ([facility] [address] [facility]     Liver disease     Hepatitis C - treated    Psychiatric diagnosis     ADHD    Psychiatric diagnosis     drug abuse.  Psychiatric disorder     anxiety    Seizures ([facility] [address]     related to substance withdrawal    Sleep apnea     no cpap - not formally diagnosed       Past Surgical History:  History reviewed. No pertinent surgical history.     Family History:  Family History   Problem Relation Age of Onset    No Known Problems Mother     Heart Disease Father        Social History:  Social History     Tobacco Use    Smoking status: Current Every Day Smoker     Packs/day: 1.00    Smokeless tobacco: Never Used   Vaping Use    Vaping Use: Former   Substance Use Topics    Alcohol use: Yes     Comment: daily    Drug use: Yes     Types: Heroin, Prescription, Cocaine Allergies:  No Known Allergies      Review of Systems   Review of Systems   Constitutional: Negative for fever. Respiratory: Negative for shortness of breath. Cardiovascular: Negative for chest pain. Gastrointestinal: Negative for abdominal pain. Musculoskeletal: Positive for myalgias. Skin: Positive for wound. All other systems reviewed and are negative. Physical Exam     Vitals:    04/26/22 1836 04/26/22 1847 04/26/22 2100   BP:  128/63    Pulse:  (!) 117    Resp:  20    Temp: (P) 99.3 °F (37.4 °C) 97.7 °F (36.5 °C)    SpO2:  97% 100%   Weight: (P) 79.4 kg (175 lb) 79.4 kg (175 lb)    Height: (P) 5' 8\" (1.727 m) 5' 8\" (1.727 m)      Physical Exam    Nursing notes and vital signs reviewed    Constitutional: Non toxic appearing, moderate distress  Head: Normocephalic, Atraumatic  Eyes: EOMI  Neck: Supple  Cardiovascular: Tachycardic and regular rhythm, no murmurs, rubs, or gallops  Chest: Normal work of breathing and chest excursion bilaterally  Lungs: Clear to ausculation bilaterally  Abdomen: Soft, non tender, non distended  Back: No evidence of trauma or deformity  Extremities: Track marks on bilateral arms and legs. Bruising and tenderness on upper, posterior right calf where he said he injected yesterday. Extensive edema and erythema at right antecubital fossa extending superiorly and inferiorly. No crepitus or fluctuance. Distal neurovascular intact.   Skin: Warm and dry, normal cap refill  Neuro: Alert and appropriate  Psychiatric: Normal mood and affect      Diagnostic Study Results     Labs -     Recent Results (from the past 12 hour(s))   POC LACTIC ACID    Collection Time: 04/26/22  7:09 PM   Result Value Ref Range    Lactic Acid (POC) 1.02 0.40 - 2.00 mmol/L   URINALYSIS W/ RFLX MICROSCOPIC    Collection Time: 04/26/22  7:15 PM   Result Value Ref Range    Color DARK YELLOW      Appearance CLEAR      Specific gravity >1.030 (H) 1.005 - 1.030    pH (UA) 5.0 5.0 - 8.0 Protein 30 (A) NEG mg/dL    Glucose Negative NEG mg/dL    Ketone TRACE (A) NEG mg/dL    Bilirubin Negative NEG      Blood Negative NEG      Urobilinogen 1.0 0.2 - 1.0 EU/dL    Nitrites Negative NEG      Leukocyte Esterase Negative NEG     METABOLIC PANEL, COMPREHENSIVE    Collection Time: 04/26/22  7:15 PM   Result Value Ref Range    Sodium 136 136 - 145 mmol/L    Potassium 3.7 3.5 - 5.5 mmol/L    Chloride 104 100 - 111 mmol/L    CO2 27 21 - 32 mmol/L    Anion gap 5 3.0 - 18 mmol/L    Glucose 80 74 - 99 mg/dL    BUN 18 7.0 - 18 MG/DL    Creatinine 0.80 0.6 - 1.3 MG/DL    BUN/Creatinine ratio 23 (H) 12 - 20      GFR est AA >60 >60 ml/min/1.73m2    GFR est non-AA >60 >60 ml/min/1.73m2    Calcium 8.7 8.5 - 10.1 MG/DL    Bilirubin, total 0.6 0.2 - 1.0 MG/DL    ALT (SGPT) 23 16 - 61 U/L    AST (SGOT) 19 10 - 38 U/L    Alk. phosphatase 86 45 - 117 U/L    Protein, total 7.6 6.4 - 8.2 g/dL    Albumin 4.1 3.4 - 5.0 g/dL    Globulin 3.5 2.0 - 4.0 g/dL    A-G Ratio 1.2 0.8 - 1.7     CBC WITH AUTOMATED DIFF    Collection Time: 04/26/22  7:15 PM   Result Value Ref Range    WBC 8.0 4.6 - 13.2 K/uL    RBC 4.30 (L) 4.35 - 5.65 M/uL    HGB 12.3 (L) 13.0 - 16.0 g/dL    HCT 37.6 36.0 - 48.0 %    MCV 87.4 78.0 - 100.0 FL    MCH 28.6 24.0 - 34.0 PG    MCHC 32.7 31.0 - 37.0 g/dL    RDW 12.5 11.6 - 14.5 %    PLATELET 760 044 - 964 K/uL    MPV 10.3 9.2 - 11.8 FL    NRBC 0.0 0  WBC    ABSOLUTE NRBC 0.00 0.00 - 0.01 K/uL    NEUTROPHILS 72 40 - 73 %    LYMPHOCYTES 16 (L) 21 - 52 %    MONOCYTES 9 3 - 10 %    EOSINOPHILS 3 0 - 5 %    BASOPHILS 0 0 - 2 %    IMMATURE GRANULOCYTES 0 0.0 - 0.5 %    ABS. NEUTROPHILS 5.7 1.8 - 8.0 K/UL    ABS. LYMPHOCYTES 1.3 0.9 - 3.6 K/UL    ABS. MONOCYTES 0.7 0.05 - 1.2 K/UL    ABS. EOSINOPHILS 0.3 0.0 - 0.4 K/UL    ABS. BASOPHILS 0.0 0.0 - 0.1 K/UL    ABS. IMM.  GRANS. 0.0 0.00 - 0.04 K/UL    DF AUTOMATED     PROTHROMBIN TIME + INR    Collection Time: 04/26/22  7:15 PM   Result Value Ref Range Prothrombin time 14.5 11.5 - 15.2 sec    INR 1.2 0.8 - 1.2     PTT    Collection Time: 04/26/22  7:15 PM   Result Value Ref Range    aPTT 38.3 (H) 23.0 - 36.4 SEC   ETHYL ALCOHOL    Collection Time: 04/26/22  7:15 PM   Result Value Ref Range    ALCOHOL(ETHYL),SERUM <3 0 - 3 MG/DL   DRUG SCREEN, URINE    Collection Time: 04/26/22  7:15 PM   Result Value Ref Range    BENZODIAZEPINES Negative NEG      BARBITURATES Negative NEG      THC (TH-CANNABINOL) Negative NEG      OPIATES Positive (A) NEG      PCP(PHENCYCLIDINE) Negative NEG      COCAINE Positive (A) NEG      AMPHETAMINES Positive (A) NEG      METHADONE Positive (A) NEG      HDSCOM (NOTE)    URINE MICROSCOPIC ONLY    Collection Time: 04/26/22  7:15 PM   Result Value Ref Range    WBC 0 to 3 0 - 5 /hpf    RBC 0 to 3 0 - 5 /hpf    Epithelial cells FEW 0 - 5 /lpf    Bacteria 1+ (A) NEG /hpf    Mucus 1+ (A) NEG /lpf    Hyaline cast 0 to 3 0 - 2 /lpf       Radiologic Studies -   CT HUMERUS RT W CONT   Final Result   Marked edematous, heterogeneous appearance of the biceps musculature   with adjacent deep and subcutaneous edema , with superficial edema extending   into the forearm as well. These findings are relatively nonspecific. Did patient   have trauma or other injury to explain these findings? Infection felt to be less   likely but not entirely excluded. No subcutaneous gas to suggest necrotizing   fasciitis. No drainable fluid collection or discrete hematoma. XR CHEST PORT    (Results Pending)   XR ELBOW RT MIN 3 V    (Results Pending)   XR TIB/FIB RT    (Results Pending)     CT Results  (Last 48 hours)               04/26/22 2149  CT HUMERUS RT W CONT Final result    Impression:  Marked edematous, heterogeneous appearance of the biceps musculature   with adjacent deep and subcutaneous edema , with superficial edema extending   into the forearm as well. These findings are relatively nonspecific.  Did patient   have trauma or other injury to explain these findings? Infection felt to be less   likely but not entirely excluded. No subcutaneous gas to suggest necrotizing   fasciitis. No drainable fluid collection or discrete hematoma. Narrative:  EXAMINATION:  CT right humerus with contrast       COMPARISON: None       HISTORY: Right arm swelling       TECHNIQUE: Contrast-enhanced CT of the right humerus . Multiplanar constructions   provided. One or more dose reduction techniques were used on this CT: automated   exposure control, adjustment of the mAs and/or kVp according to patient size,   and iterative reconstruction techniques. The specific techniques used on this   CT exam have been documented in the patient's electronic medical record. Digital Imaging [employer] Communications in Medicine (DICOM) format image data are   available to nonaffiliated external healthcare facilities or entities on a   secure, media free, reciprocally searchable basis with patient authorization for   at least a 12-month period after this study. Edd Sinha FINDINGS: Diffuse soft tissue superficial subcutaneous edema/cellulitis   involving the forearm and anterior arm. Heterogeneous, edematous appearance of   the bicep musculature. No drainable fluid collection. No subcutaneous gas   identified. Visualized vascular structures enhance normally with contrast.   Visualized osseous structures intact without acute abnormality. .               CXR Results  (Last 48 hours)    None          Medications given in the ED-  Medications   sodium chloride (NS) flush 5-10 mL (has no administration in time range)   Vancomycin - Pharmacy to dose  (has no administration in time range)   vancomycin (VANCOCIN) 1250 mg in  ml infusion (has no administration in time range)   morphine injection 8 mg (has no administration in time range)   nicotine (NICODERM CQ) 21 mg/24 hr patch 1 Patch (has no administration in time range)   sodium chloride (NS) flush 5-40 mL (has no administration in time range)   sodium chloride (NS) flush 5-40 mL (has no administration in time range)   polyethylene glycol (MIRALAX) packet 17 g (has no administration in time range)   ondansetron (ZOFRAN ODT) tablet 4 mg (has no administration in time range)     Or   ondansetron (ZOFRAN) injection 4 mg (has no administration in time range)   enoxaparin (LOVENOX) injection 40 mg (has no administration in time range)   famotidine (PEPCID) tablet 20 mg (has no administration in time range)   lactated Ringers infusion (has no administration in time range)   piperacillin-tazobactam (ZOSYN) 3.375 g in 0.9% sodium chloride (MBP/ADV) 100 mL MBP (has no administration in time range)   oxyCODONE IR (ROXICODONE) tablet 10 mg (has no administration in time range)   HYDROmorphone (DILAUDID) injection 1 mg (has no administration in time range)   acetaminophen (TYLENOL) tablet 1,000 mg (has no administration in time range)   vancomycin (VANCOCIN) 2000 mg in  ml infusion (2,000 mg IntraVENous New Bag 4/26/22 2104)   sodium chloride 0.9 % bolus infusion 1,000 mL (0 mL IntraVENous IV Completed 4/26/22 2030)   iopamidoL (ISOVUE 300) 61 % contrast injection 100 mL (100 mL IntraVENous Given 4/26/22 2126)         Medical Decision Making   I am the first provider for this patient. I reviewed the vital signs, available nursing notes, past medical history, past surgical history, family history and social history. Vital Signs-Reviewed the patient's vital signs. Pulse Oximetry Analysis - 97% on room air, not hypoxic     Records Reviewed: Nursing Notes and Old Medical Records    Provider Notes (Medical Decision Making): Darcie Jay is a 29 y.o. male presenting for arm pain in the setting of polysubstance IV drug abuse. Patient has area of clear infection on the right arm as well as multiple areas of skin popping that also appear infected. Blood culture sent and IV antibiotics initiated.   CT obtained of right upper extremity to ensure no evidence of neck fascia or deep space infection and this was negative. Suspect cellulitis and will continue to treat as such. Discussed with hospitalist for further in-hospital evaluation and management. Patient understands and agrees with this plan. Procedures:  Procedures    ED Course:   11:25 PM  Updated patient on all results and plan. All questions answered. CONSULT NOTE:   11:25 PM  Dr. Kendall Bal spoke with Dr. Barbara George   Specialty: Hospitalist  Discussed pt's hx, disposition, and available diagnostic and imaging results over the telephone. Reviewed care plans. Accepts to medical.       Diagnosis and Disposition     Critical Care Time: None    Core Measures:  For Hospitalized Patients:    1. Hospitalization Decision Time:  The decision to hospitalize the patient was made by Dr. Kendall Bal at 6:55 PM on 4/26/2022    2. Aspirin: Aspirin was not given because the patient did not present with a stroke at the time of their Emergency Department evaluation    11:25 PM  Patient is being admitted to the hospital by Dr. Barbara George. The results of their tests and reasons for their admission have been discussed with them and/or available family. They convey agreement and understanding for the need to be admitted and for their admission diagnosis. CONDITIONS ON ADMISSION:  Sepsis is not present at the time of admission. Deep Vein Thrombosis is not present at the time of admission. Thrombosis is not present at the time of admission. Urinary Tract Infection is not present at the time of admission. Pneumonia is not present at the time of admission. MRSA maybe present at the time of admission. Wound infection is present at the time of admission. Pressure Ulcer is not present at the time of admission. CLINICAL IMPRESSION:    1. Cellulitis of right upper arm      _______________________________      Please note that this dictation was completed with tic, the [employer] voice recognition software. Quite often unanticipated grammatical, syntax, homophones, and other interpretive errors are inadvertently transcribed by the computer software. Please disregard these errors. Please excuse any errors that have escaped final proofreading.

## 2022-04-26 NOTE — Clinical Note
Status[de-identified] INPATIENT [101]   Type of Bed: Medical [8]   Cardiac Monitoring Required?: No   Inpatient Hospitalization Certified Necessary for the Following Reasons: 3.  Patient receiving treatment that can only be provided in an inpatient setting (further clarification in H&P documentation)   Admitting Diagnosis: Cellulitis [202191]   Admitting Physician: Yissel Funk [[id number]]   Attending Physician: Yissel Funk [[id number]]   Estimated Length of Stay: 2 Midnights   Discharge Plan[de-identified] Home with Office Follow-up

## 2022-04-27 ENCOUNTER — HOSPITAL ENCOUNTER (INPATIENT)
Dept: VASCULAR SURGERY | Age: 37
Discharge: HOME | DRG: 603 | End: 2022-04-27
Attending: FAMILY MEDICINE
Payer: COMMERCIAL

## 2022-04-27 PROBLEM — F17.200 SMOKER: Status: ACTIVE | Noted: 2022-04-27

## 2022-04-27 PROBLEM — L03.113 RIGHT ARM CELLULITIS: Status: ACTIVE | Noted: 2022-04-27

## 2022-04-27 LAB
ABO + RH BLD: NORMAL
ALBUMIN SERPL-MCNC: 3.1 G/DL (ref 3.4–5)
ALBUMIN/GLOB SERPL: 1.1 {RATIO} (ref 0.8–1.7)
ALP SERPL-CCNC: 70 U/L (ref 45–117)
ALT SERPL-CCNC: 23 U/L (ref 16–61)
ANION GAP SERPL CALC-SCNC: 3 MMOL/L (ref 3–18)
AST SERPL-CCNC: 24 U/L (ref 10–38)
ATRIAL RATE: 84 BPM
BILIRUB SERPL-MCNC: 0.9 MG/DL (ref 0.2–1)
BLOOD GROUP ANTIBODIES SERPL: NORMAL
BUN SERPL-MCNC: 9 MG/DL (ref 7–18)
BUN/CREAT SERPL: 13 (ref 12–20)
CALCIUM SERPL-MCNC: 8.3 MG/DL (ref 8.5–10.1)
CALCULATED P AXIS, ECG09: 29 DEGREES
CALCULATED R AXIS, ECG10: 72 DEGREES
CALCULATED T AXIS, ECG11: 42 DEGREES
CHLORIDE SERPL-SCNC: 105 MMOL/L (ref 100–111)
CO2 SERPL-SCNC: 28 MMOL/L (ref 21–32)
CREAT SERPL-MCNC: 0.69 MG/DL (ref 0.6–1.3)
DIAGNOSIS, 93000: NORMAL
ERYTHROCYTE [DISTWIDTH] IN BLOOD BY AUTOMATED COUNT: 12.6 % (ref 11.6–14.5)
GLOBULIN SER CALC-MCNC: 2.8 G/DL (ref 2–4)
GLUCOSE SERPL-MCNC: 89 MG/DL (ref 74–99)
HCT VFR BLD AUTO: 33.9 % (ref 36–48)
HGB BLD-MCNC: 11.3 G/DL (ref 13–16)
MCH RBC QN AUTO: 28.5 PG (ref 24–34)
MCHC RBC AUTO-ENTMCNC: 33.3 G/DL (ref 31–37)
MCV RBC AUTO: 85.4 FL (ref 78–100)
NRBC # BLD: 0 K/UL (ref 0–0.01)
NRBC BLD-RTO: 0 PER 100 WBC
P-R INTERVAL, ECG05: 140 MS
PLATELET # BLD AUTO: 138 K/UL (ref 135–420)
PMV BLD AUTO: 10 FL (ref 9.2–11.8)
POTASSIUM SERPL-SCNC: 3.8 MMOL/L (ref 3.5–5.5)
PROT SERPL-MCNC: 5.9 G/DL (ref 6.4–8.2)
Q-T INTERVAL, ECG07: 392 MS
QRS DURATION, ECG06: 84 MS
QTC CALCULATION (BEZET), ECG08: 463 MS
RBC # BLD AUTO: 3.97 M/UL (ref 4.35–5.65)
SODIUM SERPL-SCNC: 136 MMOL/L (ref 136–145)
SPECIMEN EXP DATE BLD: NORMAL
VENTRICULAR RATE, ECG03: 84 BPM
WBC # BLD AUTO: 6.2 K/UL (ref 4.6–13.2)

## 2022-04-27 PROCEDURE — 74011250637 HC RX REV CODE- 250/637: Performed by: FAMILY MEDICINE

## 2022-04-27 PROCEDURE — 65270000029 HC RM PRIVATE

## 2022-04-27 PROCEDURE — 74011000250 HC RX REV CODE- 250: Performed by: FAMILY MEDICINE

## 2022-04-27 PROCEDURE — 85027 COMPLETE CBC AUTOMATED: CPT

## 2022-04-27 PROCEDURE — 74011250637 HC RX REV CODE- 250/637: Performed by: EMERGENCY MEDICINE

## 2022-04-27 PROCEDURE — 93971 EXTREMITY STUDY: CPT

## 2022-04-27 PROCEDURE — 74011250636 HC RX REV CODE- 250/636: Performed by: EMERGENCY MEDICINE

## 2022-04-27 PROCEDURE — 74011250637 HC RX REV CODE- 250/637: Performed by: HOSPITALIST

## 2022-04-27 PROCEDURE — 36415 COLL VENOUS BLD VENIPUNCTURE: CPT

## 2022-04-27 PROCEDURE — 74011250636 HC RX REV CODE- 250/636: Performed by: FAMILY MEDICINE

## 2022-04-27 PROCEDURE — 74011000258 HC RX REV CODE- 258: Performed by: FAMILY MEDICINE

## 2022-04-27 PROCEDURE — 80053 COMPREHEN METABOLIC PANEL: CPT

## 2022-04-27 PROCEDURE — 93005 ELECTROCARDIOGRAM TRACING: CPT

## 2022-04-27 RX ORDER — CLONIDINE HYDROCHLORIDE 0.1 MG/1
0.1 TABLET ORAL
Status: DISCONTINUED | OUTPATIENT
Start: 2022-04-27 | End: 2022-04-30 | Stop reason: HOSPADM

## 2022-04-27 RX ORDER — HYDROXYZINE 25 MG/1
25 TABLET, FILM COATED ORAL
Status: DISCONTINUED | OUTPATIENT
Start: 2022-04-27 | End: 2022-04-30 | Stop reason: HOSPADM

## 2022-04-27 RX ORDER — IBUPROFEN 200 MG
1 TABLET ORAL ONCE
Status: DISCONTINUED | OUTPATIENT
Start: 2022-04-27 | End: 2022-04-27 | Stop reason: SDUPTHER

## 2022-04-27 RX ORDER — HYDROMORPHONE HYDROCHLORIDE 2 MG/ML
1 INJECTION, SOLUTION INTRAMUSCULAR; INTRAVENOUS; SUBCUTANEOUS
Status: DISCONTINUED | OUTPATIENT
Start: 2022-04-27 | End: 2022-04-29

## 2022-04-27 RX ADMIN — SODIUM CHLORIDE, PRESERVATIVE FREE 10 ML: 5 INJECTION INTRAVENOUS at 07:47

## 2022-04-27 RX ADMIN — OXYCODONE 10 MG: 5 TABLET ORAL at 04:42

## 2022-04-27 RX ADMIN — HYDROMORPHONE HYDROCHLORIDE 1 MG: 2 INJECTION, SOLUTION INTRAMUSCULAR; INTRAVENOUS; SUBCUTANEOUS at 10:29

## 2022-04-27 RX ADMIN — FAMOTIDINE 20 MG: 20 TABLET, FILM COATED ORAL at 21:27

## 2022-04-27 RX ADMIN — MORPHINE SULFATE 8 MG: 4 INJECTION INTRAVENOUS at 01:48

## 2022-04-27 RX ADMIN — OXYCODONE 10 MG: 5 TABLET ORAL at 17:02

## 2022-04-27 RX ADMIN — HYDROXYZINE HYDROCHLORIDE 25 MG: 25 TABLET, FILM COATED ORAL at 14:06

## 2022-04-27 RX ADMIN — PIPERACILLIN AND TAZOBACTAM 3.38 G: 3; .375 INJECTION, POWDER, LYOPHILIZED, FOR SOLUTION INTRAVENOUS at 01:49

## 2022-04-27 RX ADMIN — PIPERACILLIN AND TAZOBACTAM 3.38 G: 3; .375 INJECTION, POWDER, LYOPHILIZED, FOR SOLUTION INTRAVENOUS at 14:06

## 2022-04-27 RX ADMIN — PIPERACILLIN AND TAZOBACTAM 3.38 G: 3; .375 INJECTION, POWDER, LYOPHILIZED, FOR SOLUTION INTRAVENOUS at 07:47

## 2022-04-27 RX ADMIN — PIPERACILLIN AND TAZOBACTAM 3.38 G: 3; .375 INJECTION, POWDER, LYOPHILIZED, FOR SOLUTION INTRAVENOUS at 20:37

## 2022-04-27 RX ADMIN — SODIUM CHLORIDE, SODIUM LACTATE, POTASSIUM CHLORIDE, AND CALCIUM CHLORIDE 100 ML/HR: 600; 310; 30; 20 INJECTION, SOLUTION INTRAVENOUS at 16:19

## 2022-04-27 RX ADMIN — SODIUM CHLORIDE, PRESERVATIVE FREE 10 ML: 5 INJECTION INTRAVENOUS at 14:06

## 2022-04-27 RX ADMIN — HYDROXYZINE HYDROCHLORIDE 25 MG: 25 TABLET, FILM COATED ORAL at 21:27

## 2022-04-27 RX ADMIN — OXYCODONE 10 MG: 5 TABLET ORAL at 23:32

## 2022-04-27 RX ADMIN — ONDANSETRON 4 MG: 2 INJECTION INTRAMUSCULAR; INTRAVENOUS at 12:13

## 2022-04-27 RX ADMIN — ENOXAPARIN SODIUM 40 MG: 100 INJECTION SUBCUTANEOUS at 10:27

## 2022-04-27 RX ADMIN — CLONIDINE HYDROCHLORIDE 0.1 MG: 0.1 TABLET ORAL at 14:06

## 2022-04-27 RX ADMIN — VANCOMYCIN HYDROCHLORIDE 1250 MG: 100 INJECTION, POWDER, LYOPHILIZED, FOR SOLUTION INTRAVENOUS at 10:27

## 2022-04-27 RX ADMIN — HYDROMORPHONE HYDROCHLORIDE 1 MG: 2 INJECTION, SOLUTION INTRAMUSCULAR; INTRAVENOUS; SUBCUTANEOUS at 04:56

## 2022-04-27 RX ADMIN — FAMOTIDINE 20 MG: 20 TABLET, FILM COATED ORAL at 10:27

## 2022-04-27 RX ADMIN — HYDROMORPHONE HYDROCHLORIDE 1 MG: 2 INJECTION, SOLUTION INTRAMUSCULAR; INTRAVENOUS; SUBCUTANEOUS at 14:10

## 2022-04-27 RX ADMIN — HYDROMORPHONE HYDROCHLORIDE 1 MG: 2 INJECTION, SOLUTION INTRAMUSCULAR; INTRAVENOUS; SUBCUTANEOUS at 21:27

## 2022-04-27 RX ADMIN — Medication 1 TABLET: at 21:27

## 2022-04-27 RX ADMIN — OXYCODONE 10 MG: 5 TABLET ORAL at 10:27

## 2022-04-27 RX ADMIN — SODIUM CHLORIDE, SODIUM LACTATE, POTASSIUM CHLORIDE, AND CALCIUM CHLORIDE 100 ML/HR: 600; 310; 30; 20 INJECTION, SOLUTION INTRAVENOUS at 00:26

## 2022-04-27 RX ADMIN — VANCOMYCIN HYDROCHLORIDE 1250 MG: 100 INJECTION, POWDER, LYOPHILIZED, FOR SOLUTION INTRAVENOUS at 21:27

## 2022-04-27 NOTE — PROGRESS NOTES
Hospitalist Progress Note    Patient: Wilfredo Burr MRN: [id number]  CSN: [id number]    YOB: 1985  Age: 29 y.o. Sex: male    DOA: 4/26/2022 LOS:  LOS: 1 day                Assessment/Plan     Patient Active Problem List   Diagnosis Code    Polysubstance abuse ([facility] [address] F19.10    Lactic acidosis E87.2    Alcohol abuse F10.10    Hepatitis-C B19.20    Right arm cellulitis L03. 80    Smoker F17.200        Chief complaint :  Right arm swelling  81-year-old male who is a smoker with polysubstance abuse is admitted for right arm cellulitis where he usually injects heroin. RUE swelling/cellulitis -  Continue with antibiotics  RUE duplex negative for DVT  Keep arm elevated. Polysubstance abuse -  Heroin  On opoid withdrawal protocol    DVT prophylaxis with lovenox      Disposition : TBD    Review of systems  General: No fevers or chills. Cardiovascular: No chest pain or pressure. No palpitations. Pulmonary: No shortness of breath. Gastrointestinal: No nausea, vomiting. Physical Exam:  General: Awake, cooperative, no acute distress    HEENT: NC, Atraumatic. PERRLA, anicteric sclerae. Lungs: CTA Bilaterally. No Wheezing/Rhonchi/Rales. Heart:  S1 S2,  No murmur, No Rubs, No Gallops  Abdomen: Soft, Non distended, Non tender.  +Bowel sounds,   Extremities: No c/c/e  Psych:   Not anxious or agitated. Neurologic:  No acute neurological deficit.            Vital signs/Intake and Output:  Visit Vitals  /70   Pulse 70   Temp 98.9 °F (37.2 °C)   Resp 16   Ht 5' 8\" (1.727 m)   Wt 79.4 kg (175 lb 0.7 oz)   SpO2 100%   BMI 26.62 kg/m²     Current Shift:  04/27 0701 - 04/27 1900  In: 120 [P.O.:120]  Out: -   Last three shifts:  04/25 1901 - 04/27 0700  In: 2130 [P.O.:480; I.V.:1650]  Out: 0             Labs: Results:       Chemistry Recent Labs     04/27/22  0539 04/26/22  1915   GLU 89 80    136   K 3.8 3.7    104   CO2 28 27   BUN 9 18   CREA 0.69 0.80   CA 8.3* 8.7 AGAP 3 5   BUCR 13 23*   AP 70 86   TP 5.9* 7.6   ALB 3.1* 4.1   GLOB 2.8 3.5   AGRAT 1.1 1.2      CBC w/Diff Recent Labs     04/27/22  0539 04/26/22 1915   WBC 6.2 8.0   RBC 3.97* 4.30*   HGB 11.3* 12.3*   HCT 33.9* 37.6    185   GRANS  --  72   LYMPH  --  16*   EOS  --  3      Cardiac Enzymes No results for input(s): CPK, CKND1, BUD in the last 72 hours. No lab exists for component: CKRMB, TROIP   Coagulation Recent Labs     04/26/22 1915   PTP 14.5   INR 1.2   APTT 38.3*       Lipid Panel No results found for: CHOL, CHOLPOCT, CHOLX, CHLST, CHOLV, 304412, HDL, HDLP, LDL, LDLC, DLDLP, 442071, VLDLC, VLDL, TGLX, TRIGL, TRIGP, TGLPOCT, CHHD, CHHDX   BNP No results for input(s): BNPP in the last 72 hours.    Liver Enzymes Recent Labs     04/27/22  0539   TP 5.9*   ALB 3.1*   AP 70      Thyroid Studies Lab Results   Component Value Date/Time    TSH 1.49 03/11/2016 02:55 AM        Procedures/imaging: see electronic medical records for all procedures/Xrays and details which were not copied into this note but were reviewed prior to creation of Plan

## 2022-04-27 NOTE — H&P
History & Physical    Patient: Shoshana Archer MRN: [id number]  CSN: [id number]    YOB: 1985  Age: 29 y.o. Sex: male      DOA: 4/26/2022  Primary Care Provider:  Usama Montiel MD      Assessment/Plan     Patient Active Problem List   Diagnosis Code    Polysubstance abuse [facility]) F19.10    Drug overdose, multiple drugs T50.911A    Elevated LFTs R79.89    Overdose of heroin ([facility] [address] T40.1X1A    EZEQUIEL (acute kidney injury) ([facility] [address] N17.9    ATN (acute tubular necrosis) ([facility]) N17.0    Elevated troponin R77.8    Lactic acidosis E87.2    Encephalopathy G93.40    Acute respiratory failure with hypoxia and hypercapnia ([facility]) J96.01, J96.02    Hypokalemia E87.6    ARDS (adult respiratory distress syndrome) ([facility]) J80    Aspiration pneumonia ([facility] [facility] J69.0    Alcohol abuse F10.10    Hepatitis-C B19.20    Right arm cellulitis L03. 80    Smoker F16.5     73-year-old male who is a smoker with polysubstance abuse is admitted for right arm cellulitis where he usually injects heroin.     Right arm cellulitis  - IV antibiotics with Zosyn and vancomycin  - Duplex ultrasound to rule out DVT  -Pain control will likely be an issue due to high pain tolerance-Dilaudid ordered as needed for now    Smoker  -Smoking cessation recommended  -Nicotine patch ordered    Polysubstance abuse-drug screen is positive for opiates, cocaine, methadone, and amphetamines (uses Adderall)  -He was followed in methadone and Suboxone clinics in the past  -He is not interested in rehab at this time  -Cessation of drug use is recommended due to the risk of infectious endocarditis and bloodstream infections related to IV drug use  -He is at high risk of drug withdrawal as he uses heroin daily    Full code    Prophylaxis: Pepcid p.o., Lovenox SQ    Estimated length of stay : 3-4 nights    Soo Parker MD [place]  ----------------------------------------------------------------------------------------------------------------------------------------------------------------------------------------------------------------  CC: Right arm swelling       HPI:     Silver Up is a 29 y.o. male smoker with polysubstance abuse presents to the ED for right arm swelling since yesterday at his usual injection site. He is right-hand dominant and works as a . He injects cocaine and heroin regularly. He denies alcohol use. He went to the [facility] earlier this year for drug rehab but is not interested in going back at this time. He has had fever, chills, and body aches. He denies any chest pain, shortness of breath, or vomiting. Past Medical History:   Diagnosis Date    Drug abuse, IV ([facility] [address] [facility]     Heroin abuse ([facility] [address]     Liver disease     Hepatitis C - treated    Psychiatric diagnosis     ADHD    Psychiatric diagnosis     drug abuse.  Psychiatric disorder     anxiety    Seizures ([facility] [address]     related to substance withdrawal    Sleep apnea     no cpap - not formally diagnosed       History reviewed. No pertinent surgical history. Family History   Problem Relation Age of Onset    No Known Problems Mother     Heart Disease Father        Social History     Socioeconomic History    Marital status: SINGLE   Tobacco Use    Smoking status: Current Every Day Smoker     Packs/day: 1.00    Smokeless tobacco: Never Used   Vaping Use    Vaping Use: Former   Substance and Sexual Activity    Alcohol use: Yes     Comment: daily    Drug use: Yes     Types: Heroin, Prescription, Cocaine   Social History Narrative    ** Merged History Encounter **            Prior to Admission medications    Medication Sig Start Date End Date Taking? Authorizing Provider   amoxicillin-clavulanate (Augmentin) 875-125 mg per tablet Take 1 Tablet by mouth every twelve (12) hours.  7/27/21   Leonor, Spencer Vail MD   LORazepam (Ativan) 1 mg tablet Take 1 Tablet by mouth every twelve (12) hours as needed for Anxiety. Max Daily Amount: 2 mg. 21   Spencer Galvez MD   amphetamine-dextroamphetamine XR (Adderall XR) 20 mg XR capsule Take 20 mg by mouth three (3) times daily. Other, MD Celia       No Known Allergies    Review of Systems  Gen: +fever, chills, malaise. Heent: No headache, rhinorrhea, sore throat. Heart: No chest pain, palpitations, PUCKETT. Resp: No cough, hemoptysis, wheezing and shortness of breath. GI: No nausea, vomiting, diarrhea, constipation, melena or hematochezia. : No urinary obstruction, dysuria or hematuria. Derm: No rash, new skin lesion or pruritis. Musc/skeletal: no bone or joint complaints. Vasc: R arm swelling. Endo: No heat/cold intolerance, no polyuria,polydipsia or polyphagia. Neuro: No unilateral weakness, numbness, tingling. No seizures. Heme: No easy bruising or bleeding. Physical Exam:     Physical Exam:  Visit Vitals  /84 (BP 1 Location: Left upper arm, BP Patient Position: At rest)   Pulse 93   Temp 98.4 °F (36.9 °C)   Resp 16   Ht 5' 8\" (1.727 m)   Wt 79.4 kg (175 lb)   SpO2 100%   BMI 26.61 kg/m²      O2 Device: None (Room air)    Temp (24hrs), Av.5 °F (36.9 °C), Min:97.7 °F (36.5 °C), Max:99.3 °F (37.4 °C)    1901 -  0700  In: 1650 [I.V.:1650]  Out: -    No intake/output data recorded. General:  Awake, eyes closed, no distress. Head:  Normocephalic, without obvious abnormality, atraumatic. Eyes:  Conjunctivae/corneas clear, sclera anicteric. Neck: Supple, symmetrical, trachea midline. Lungs:   Clear to auscultation bilaterally. Heart:  Regular rate and rhythm, S1, S2 normal, no murmur, click, rub or gallop. Abdomen: Soft, non-tender. Bowel sounds normal. No masses,  No organomegaly. Extremities: Right forearm with marked edema most pronounced at the [facility] [place].  He has an indurated area extending from the AC into the biceps region. No purulent drainage. Warm but no erythema. Capillary refill normal.   Pulses: 2+ and symmetric all extremities. Skin: Skin color pink, turgor normal. No rashes or lesions   Neurologic: No focal motor or sensory deficit. Labs Reviewed: All lab results for the last 24 hours reviewed. Recent Results (from the past 24 hour(s))   POC LACTIC ACID    Collection Time: 04/26/22  7:09 PM   Result Value Ref Range    Lactic Acid (POC) 1.02 0.40 - 2.00 mmol/L   URINALYSIS W/ RFLX MICROSCOPIC    Collection Time: 04/26/22  7:15 PM   Result Value Ref Range    Color DARK YELLOW      Appearance CLEAR      Specific gravity >1.030 (H) 1.005 - 1.030    pH (UA) 5.0 5.0 - 8.0      Protein 30 (A) NEG mg/dL    Glucose Negative NEG mg/dL    Ketone TRACE (A) NEG mg/dL    Bilirubin Negative NEG      Blood Negative NEG      Urobilinogen 1.0 0.2 - 1.0 EU/dL    Nitrites Negative NEG      Leukocyte Esterase Negative NEG     METABOLIC PANEL, COMPREHENSIVE    Collection Time: 04/26/22  7:15 PM   Result Value Ref Range    Sodium 136 136 - 145 mmol/L    Potassium 3.7 3.5 - 5.5 mmol/L    Chloride 104 100 - 111 mmol/L    CO2 27 21 - 32 mmol/L    Anion gap 5 3.0 - 18 mmol/L    Glucose 80 74 - 99 mg/dL    BUN 18 7.0 - 18 MG/DL    Creatinine 0.80 0.6 - 1.3 MG/DL    BUN/Creatinine ratio 23 (H) 12 - 20      GFR est AA >60 >60 ml/min/1.73m2    GFR est non-AA >60 >60 ml/min/1.73m2    Calcium 8.7 8.5 - 10.1 MG/DL    Bilirubin, total 0.6 0.2 - 1.0 MG/DL    ALT (SGPT) 23 16 - 61 U/L    AST (SGOT) 19 10 - 38 U/L    Alk.  phosphatase 86 45 - 117 U/L    Protein, total 7.6 6.4 - 8.2 g/dL    Albumin 4.1 3.4 - 5.0 g/dL    Globulin 3.5 2.0 - 4.0 g/dL    A-G Ratio 1.2 0.8 - 1.7     CBC WITH AUTOMATED DIFF    Collection Time: 04/26/22  7:15 PM   Result Value Ref Range    WBC 8.0 4.6 - 13.2 K/uL    RBC 4.30 (L) 4.35 - 5.65 M/uL    HGB 12.3 (L) 13.0 - 16.0 g/dL    HCT 37.6 36.0 - 48.0 %    MCV 87.4 78.0 - 100.0 FL    MCH 28.6 24.0 - 34.0 PG    MCHC 32.7 31.0 - 37.0 g/dL    RDW 12.5 11.6 - 14.5 %    PLATELET 904 658 - 921 K/uL    MPV 10.3 9.2 - 11.8 FL    NRBC 0.0 0  WBC    ABSOLUTE NRBC 0.00 0.00 - 0.01 K/uL    NEUTROPHILS 72 40 - 73 %    LYMPHOCYTES 16 (L) 21 - 52 %    MONOCYTES 9 3 - 10 %    EOSINOPHILS 3 0 - 5 %    BASOPHILS 0 0 - 2 %    IMMATURE GRANULOCYTES 0 0.0 - 0.5 %    ABS. NEUTROPHILS 5.7 1.8 - 8.0 K/UL    ABS. LYMPHOCYTES 1.3 0.9 - 3.6 K/UL    ABS. MONOCYTES 0.7 0.05 - 1.2 K/UL    ABS. EOSINOPHILS 0.3 0.0 - 0.4 K/UL    ABS. BASOPHILS 0.0 0.0 - 0.1 K/UL    ABS. IMM.  GRANS. 0.0 0.00 - 0.04 K/UL    DF AUTOMATED     PROTHROMBIN TIME + INR    Collection Time: 04/26/22  7:15 PM   Result Value Ref Range    Prothrombin time 14.5 11.5 - 15.2 sec    INR 1.2 0.8 - 1.2     PTT    Collection Time: 04/26/22  7:15 PM   Result Value Ref Range    aPTT 38.3 (H) 23.0 - 36.4 SEC   TYPE & SCREEN    Collection Time: 04/26/22  7:15 PM   Result Value Ref Range    Crossmatch Expiration 04/29/2022,2359     ABO/Rh(D) A POSITIVE     Antibody screen NEG    ETHYL ALCOHOL    Collection Time: 04/26/22  7:15 PM   Result Value Ref Range    ALCOHOL(ETHYL),SERUM <3 0 - 3 MG/DL   DRUG SCREEN, URINE    Collection Time: 04/26/22  7:15 PM   Result Value Ref Range    BENZODIAZEPINES Negative NEG      BARBITURATES Negative NEG      THC (TH-CANNABINOL) Negative NEG      OPIATES Positive (A) NEG      PCP(PHENCYCLIDINE) Negative NEG      COCAINE Positive (A) NEG      AMPHETAMINES Positive (A) NEG      METHADONE Positive (A) NEG      HDSCOM (NOTE)    URINE MICROSCOPIC ONLY    Collection Time: 04/26/22  7:15 PM   Result Value Ref Range    WBC 0 to 3 0 - 5 /hpf    RBC 0 to 3 0 - 5 /hpf    Epithelial cells FEW 0 - 5 /lpf    Bacteria 1+ (A) NEG /hpf    Mucus 1+ (A) NEG /lpf    Hyaline cast 0 to 3 0 - 2 /lpf       Results  CT HUMERUS RT W CONT (Accession [id number]) (Order [id number])    Allergies       No Known Allergies     Exam Information    Status Exam Begun  Exam Ended Final [99] 4/26/2022 21:08 4/26/2022  9:49 PM [id number]  9:49 PM     Result Information    Status: Final result (Exam End: 4/26/2022 21:49) Provider Status: Open       CT HUMERUS RT W CONT: Patient Communication     Released  Not seen     Study Result    Narrative & Impression   EXAMINATION:  CT right humerus with contrast     COMPARISON: None     HISTORY: Right arm swelling     TECHNIQUE: Contrast-enhanced CT of the right humerus . Multiplanar constructions  provided. One or more dose reduction techniques were used on this CT: automated  exposure control, adjustment of the mAs and/or kVp according to patient size,  and iterative reconstruction techniques. The specific techniques used on this  CT exam have been documented in the patient's electronic medical record. Digital Imaging and Communications in Medicine (DICOM) format image data are  available to nonaffiliated external healthcare facilities or entities on a  secure, media free, reciprocally searchable basis with patient authorization for  at least a 12-month period after this study. .     FINDINGS: Diffuse soft tissue superficial subcutaneous edema/cellulitis  involving the forearm and anterior arm. Heterogeneous, edematous appearance of  the bicep musculature. No drainable fluid collection. No subcutaneous gas  identified. Visualized vascular structures enhance normally with contrast.  Visualized osseous structures intact without acute abnormality. .     IMPRESSION  Marked edematous, heterogeneous appearance of the biceps musculature  with adjacent deep and subcutaneous edema , with superficial edema extending  into the forearm as well. These findings are relatively nonspecific. Did patient  have trauma or other injury to explain these findings? Infection felt to be less  likely but not entirely excluded. No subcutaneous gas to suggest necrotizing  fasciitis. No drainable fluid collection or discrete hematoma.

## 2022-04-27 NOTE — PROGRESS NOTES
D/C plan: Home friends to transport. Discussed the pt w/ Dr. Altaf Chambers. Abx to be determined pending cx. Chart Review assessment completed: Per notes in the chart; the pt is independent at baseline. The pt has a hx of substance abuse. The pt will d/c on abx. If the pt requires IV abx at d/c, it will have to be once daily dose and the pt would have to go to an outpt infusion center and a new IV will have to be started daily due to substance abuse hx. Reason for Admission:  Cellulitis of R arm. Hx of polysubstance abuse and attempted to start an IV prior to admission. RUR Score:          10%           Plan for utilizing home health:      No    PCP: First and Last name:  Terri Mujica MD     Name of Practice:    Are you a current patient: Yes/No:    Approximate date of last visit:    Can you participate in a virtual visit with your PCP:                     Current Advanced Directive/Advance Care Plan: Full Code      Healthcare Decision Maker:   Click here to complete [address] including selection of the Healthcare Decision Maker Relationship (ie \"Primary\")             Primary Decision Maker: Sheyla Gabriel - [phone]                  Transition of Care Plan:                  Home w/ f/u at [facility] if IV abx are needed at d/c. Care Management Interventions  PCP Verified by CM: Yes (Per Chart Review: Balta Sevilla )  Mode of Transport at Discharge: Other (see comment) (Family to transport. )  Transition of Care Consult (CM Consult):  Other (Home w/ outpt infusion center if discharging on IV abx as pt will have to have a new IV started daily if he d/c's w/ IV abx needs. )  Discharge Durable Medical Equipment: No  Physical Therapy Consult: No  Occupational Therapy Consult: No  Support Systems: Spouse/Significant Other,Other Family Member(s),Friend/Neighbor  Confirm Follow Up Transport: Self  The Plan for Transition of Care is Related to the Following Treatment Goals : Home  Discharge Location  Patient Expects to be Discharged to[de-identified] Home

## 2022-04-27 NOTE — PROGRESS NOTES
[address] [facility] Pharmacokinetic Monitoring Service - Vancomycin Day 2    Ayesha Aguirre is a 29 y.o. male starting on vancomycin therapy for skin and soft tissue infection. Pharmacy consulted by Dr. Alba Mills for monitoring and adjustment. Target Concentration: Goal AUC/-600 mg*hr/L    Additional Antimicrobials: Zosyn / Clindamycin     Pertinent Laboratory Values: Wt Readings from Last 1 Encounters:   04/27/22 79.4 kg (175 lb 0.7 oz)     Temp Readings from Last 1 Encounters:   04/27/22 98.9 °F (37.2 °C)     No components found for: PROCAL  Estimated Creatinine Clearance: 124.7 mL/min (based on SCr of 0.8 mg/dL).   Recent Labs     04/27/22  0539 04/26/22  1915   WBC 6.2 8.0     Plan:  Dosing recommendations based on Bayesian software  Start vancomycin 2000 mg IV x 1 loading dose then Vancomycin 1250 mg IV q12h   Anticipated AUC of 464 mg/l,h and trough concentration of 13.1 mg/l at steady state  Random level scheduled for tomorrow 4/28 @ 0400  Renal labs as indicated   Pharmacy will continue to monitor patient and adjust therapy as indicated    Cristal Weems, [address],    4/27/2022

## 2022-04-27 NOTE — ED NOTES
3rd attempt to call report, RN still unavailable. Pt to be transported to the inpatient room at this time according to ED protocol per Charge RN.

## 2022-04-27 NOTE — PROGRESS NOTES
visited with the family of Kimber Au, who is a 29 y. o.,male. The  provided the following Interventions:  Initiated a relationship of care and support. Provided devotional matrial and greeting card. Offered prayer and assurance of continued prayers on patients behalf. Plan:  Chaplains will continue to follow and will provide pastoral care on an as needed/requested basis.  recommends bedside caregivers page  on duty if patient shows signs of acute spiritual or emotional distress. Rev.  Meng Cazares,Certified [employer] Advance Care   Coordinator [place]  [phone]

## 2022-04-27 NOTE — PROGRESS NOTES
[address] [facility] Pharmacokinetic Monitoring Service - Vancomycin     Neftaly Stokes is a 29 y.o. male starting on vancomycin therapy for skin and soft tissue infection. Pharmacy consulted by Dr. Evan Cruz for monitoring and adjustment. Target Concentration: Goal AUC/-600 mg*hr/L    Additional Antimicrobials: Zosyn / Clindamycin     Pertinent Laboratory Values: Wt Readings from Last 1 Encounters:   04/26/22 79.4 kg (175 lb)     Temp Readings from Last 1 Encounters:   04/26/22 97.7 °F (36.5 °C)     No components found for: PROCAL  Estimated Creatinine Clearance: 124.7 mL/min (based on SCr of 0.8 mg/dL).   Recent Labs     04/26/22  1915   WBC 8.0     Plan:  Dosing recommendations based on Bayesian software  Start vancomycin 2000 mg IV x 1 loading dose then Vancomycin 1250 mg IV q12h   Anticipated AUC of 478 mg/l,h and trough concentration of 13.9 mg/l at steady state  Renal labs as indicated   Pharmacy will continue to monitor patient and adjust therapy as indicated    OMAIRA Momin [place], [facility]   4/26/2022 8:17 PM

## 2022-04-28 LAB
ALBUMIN SERPL-MCNC: 2.9 G/DL (ref 3.4–5)
ALBUMIN/GLOB SERPL: 0.9 {RATIO} (ref 0.8–1.7)
ALP SERPL-CCNC: 73 U/L (ref 45–117)
ALT SERPL-CCNC: 20 U/L (ref 16–61)
ANION GAP SERPL CALC-SCNC: 7 MMOL/L (ref 3–18)
AST SERPL-CCNC: 16 U/L (ref 10–38)
BILIRUB SERPL-MCNC: 0.8 MG/DL (ref 0.2–1)
BUN SERPL-MCNC: 6 MG/DL (ref 7–18)
BUN/CREAT SERPL: 10 (ref 12–20)
CALCIUM SERPL-MCNC: 8.4 MG/DL (ref 8.5–10.1)
CHLORIDE SERPL-SCNC: 105 MMOL/L (ref 100–111)
CO2 SERPL-SCNC: 27 MMOL/L (ref 21–32)
CREAT SERPL-MCNC: 0.58 MG/DL (ref 0.6–1.3)
ERYTHROCYTE [DISTWIDTH] IN BLOOD BY AUTOMATED COUNT: 12.4 % (ref 11.6–14.5)
GLOBULIN SER CALC-MCNC: 3.1 G/DL (ref 2–4)
GLUCOSE SERPL-MCNC: 105 MG/DL (ref 74–99)
HCT VFR BLD AUTO: 35.8 % (ref 36–48)
HGB BLD-MCNC: 12 G/DL (ref 13–16)
MCH RBC QN AUTO: 28.8 PG (ref 24–34)
MCHC RBC AUTO-ENTMCNC: 33.5 G/DL (ref 31–37)
MCV RBC AUTO: 86.1 FL (ref 78–100)
NRBC # BLD: 0 K/UL (ref 0–0.01)
NRBC BLD-RTO: 0 PER 100 WBC
PLATELET # BLD AUTO: 177 K/UL (ref 135–420)
PMV BLD AUTO: 10.2 FL (ref 9.2–11.8)
POTASSIUM SERPL-SCNC: 3.9 MMOL/L (ref 3.5–5.5)
PROT SERPL-MCNC: 6 G/DL (ref 6.4–8.2)
RBC # BLD AUTO: 4.16 M/UL (ref 4.35–5.65)
SODIUM SERPL-SCNC: 139 MMOL/L (ref 136–145)
VANCOMYCIN SERPL-MCNC: 11.5 UG/ML (ref 5–40)
WBC # BLD AUTO: 6.8 K/UL (ref 4.6–13.2)

## 2022-04-28 PROCEDURE — 65270000029 HC RM PRIVATE

## 2022-04-28 PROCEDURE — 74011250637 HC RX REV CODE- 250/637: Performed by: FAMILY MEDICINE

## 2022-04-28 PROCEDURE — 74011250636 HC RX REV CODE- 250/636: Performed by: EMERGENCY MEDICINE

## 2022-04-28 PROCEDURE — 74011000258 HC RX REV CODE- 258: Performed by: FAMILY MEDICINE

## 2022-04-28 PROCEDURE — 74011250636 HC RX REV CODE- 250/636: Performed by: FAMILY MEDICINE

## 2022-04-28 PROCEDURE — 80053 COMPREHEN METABOLIC PANEL: CPT

## 2022-04-28 PROCEDURE — 74011250637 HC RX REV CODE- 250/637: Performed by: HOSPITALIST

## 2022-04-28 PROCEDURE — 85027 COMPLETE CBC AUTOMATED: CPT

## 2022-04-28 PROCEDURE — 80202 ASSAY OF VANCOMYCIN: CPT

## 2022-04-28 PROCEDURE — 36415 COLL VENOUS BLD VENIPUNCTURE: CPT

## 2022-04-28 RX ORDER — VANCOMYCIN/0.9 % SOD CHLORIDE 1.5G/250ML
1500 PLASTIC BAG, INJECTION (ML) INTRAVENOUS EVERY 12 HOURS
Status: DISCONTINUED | OUTPATIENT
Start: 2022-04-28 | End: 2022-04-30 | Stop reason: HOSPADM

## 2022-04-28 RX ORDER — IBUPROFEN 200 MG
1 TABLET ORAL DAILY
Status: DISCONTINUED | OUTPATIENT
Start: 2022-04-28 | End: 2022-04-28

## 2022-04-28 RX ORDER — IBUPROFEN 200 MG
1 TABLET ORAL DAILY
Status: DISCONTINUED | OUTPATIENT
Start: 2022-04-29 | End: 2022-04-30 | Stop reason: HOSPADM

## 2022-04-28 RX ADMIN — HYDROXYZINE HYDROCHLORIDE 25 MG: 25 TABLET, FILM COATED ORAL at 13:13

## 2022-04-28 RX ADMIN — HYDROMORPHONE HYDROCHLORIDE 1 MG: 2 INJECTION, SOLUTION INTRAMUSCULAR; INTRAVENOUS; SUBCUTANEOUS at 04:36

## 2022-04-28 RX ADMIN — ACETAMINOPHEN 1000 MG: 500 TABLET ORAL at 21:03

## 2022-04-28 RX ADMIN — VANCOMYCIN HYDROCHLORIDE 1500 MG: 100 INJECTION, POWDER, LYOPHILIZED, FOR SOLUTION INTRAVENOUS at 10:38

## 2022-04-28 RX ADMIN — HYDROXYZINE HYDROCHLORIDE 25 MG: 25 TABLET, FILM COATED ORAL at 19:43

## 2022-04-28 RX ADMIN — PIPERACILLIN AND TAZOBACTAM 3.38 G: 3; .375 INJECTION, POWDER, LYOPHILIZED, FOR SOLUTION INTRAVENOUS at 07:47

## 2022-04-28 RX ADMIN — OXYCODONE 10 MG: 5 TABLET ORAL at 17:03

## 2022-04-28 RX ADMIN — HYDROMORPHONE HYDROCHLORIDE 1 MG: 2 INJECTION, SOLUTION INTRAMUSCULAR; INTRAVENOUS; SUBCUTANEOUS at 19:43

## 2022-04-28 RX ADMIN — PIPERACILLIN AND TAZOBACTAM 3.38 G: 3; .375 INJECTION, POWDER, LYOPHILIZED, FOR SOLUTION INTRAVENOUS at 13:13

## 2022-04-28 RX ADMIN — Medication 1 TABLET: at 09:15

## 2022-04-28 RX ADMIN — ENOXAPARIN SODIUM 40 MG: 100 INJECTION SUBCUTANEOUS at 09:15

## 2022-04-28 RX ADMIN — PIPERACILLIN AND TAZOBACTAM 3.38 G: 3; .375 INJECTION, POWDER, LYOPHILIZED, FOR SOLUTION INTRAVENOUS at 01:37

## 2022-04-28 RX ADMIN — PIPERACILLIN AND TAZOBACTAM 3.38 G: 3; .375 INJECTION, POWDER, LYOPHILIZED, FOR SOLUTION INTRAVENOUS at 19:43

## 2022-04-28 RX ADMIN — FAMOTIDINE 20 MG: 20 TABLET, FILM COATED ORAL at 20:59

## 2022-04-28 RX ADMIN — HYDROMORPHONE HYDROCHLORIDE 1 MG: 2 INJECTION, SOLUTION INTRAMUSCULAR; INTRAVENOUS; SUBCUTANEOUS at 09:15

## 2022-04-28 RX ADMIN — OXYCODONE 10 MG: 5 TABLET ORAL at 09:15

## 2022-04-28 RX ADMIN — SODIUM CHLORIDE, SODIUM LACTATE, POTASSIUM CHLORIDE, AND CALCIUM CHLORIDE 100 ML/HR: 600; 310; 30; 20 INJECTION, SOLUTION INTRAVENOUS at 17:37

## 2022-04-28 RX ADMIN — HYDROMORPHONE HYDROCHLORIDE 1 MG: 2 INJECTION, SOLUTION INTRAMUSCULAR; INTRAVENOUS; SUBCUTANEOUS at 13:13

## 2022-04-28 RX ADMIN — FAMOTIDINE 20 MG: 20 TABLET, FILM COATED ORAL at 09:15

## 2022-04-28 RX ADMIN — SODIUM CHLORIDE, SODIUM LACTATE, POTASSIUM CHLORIDE, AND CALCIUM CHLORIDE 100 ML/HR: 600; 310; 30; 20 INJECTION, SOLUTION INTRAVENOUS at 04:36

## 2022-04-28 RX ADMIN — Medication 1 TABLET: at 20:59

## 2022-04-28 RX ADMIN — VANCOMYCIN HYDROCHLORIDE 1500 MG: 100 INJECTION, POWDER, LYOPHILIZED, FOR SOLUTION INTRAVENOUS at 21:00

## 2022-04-28 RX ADMIN — CLONIDINE HYDROCHLORIDE 0.1 MG: 0.1 TABLET ORAL at 19:43

## 2022-04-28 NOTE — PROGRESS NOTES
Hospitalist Progress Note    Patient: Rizwan Sin MRN: [id number]  CSN: [id number]    YOB: 1985  Age: 29 y.o. Sex: male    DOA: 4/26/2022 LOS:  LOS: 2 days          Assessment/Plan     Patient Active Problem List   Diagnosis Code    Polysubstance abuse ([facility] [address] F19.10    Alcohol abuse F10.10    Hepatitis-C B19.20    Right arm cellulitis L03. 80    Smoker F17.200        Chief complaint :  Right arm swelling    81-year-old male who is a smoker with polysubstance abuse is admitted for right arm cellulitis where he usually injects heroin. RUE swelling/cellulitis -  Continue with antibiotics  RUE duplex negative for DVT  Keep arm elevated  Pt c/o pain out of proportion with clinical assessment     Polysubstance abuse -  Heroin  On opoid withdrawal protocol    DVT prophylaxis with lovenox      Disposition : TBD     S: Pt c/o significant pain in his right arm. At [address] Reyes said that his pain was just as bad as when he came in initially, but later admitted that the reddness was indeed better but the pain is very severe    Review of systems  General: No fevers or chills. Cardiovascular: No chest pain or pressure. No palpitations. Pulmonary: No shortness of breath. Gastrointestinal: No nausea, vomiting. Physical Exam:  General: Awake, cooperative, no acute distress    HEENT: NC, Atraumatic. PERRLA, anicteric sclerae. Lungs: CTA Bilaterally. No Wheezing/Rhonchi/Rales. Heart:  S1 S2,  No murmur, No Rubs, No Gallops  Abdomen: Soft, Non distended, Non tender.  +Bowel sounds,   Extremities: No c/c/e  Psych:   Not anxious or agitated. Neurologic:  No acute neurological deficit. Vital signs/Intake and Output:  Visit Vitals  /65   Pulse 70   Temp 98.4 °F (36.9 °C)   Resp 18   Ht 5' 8\" (1.727 m)   Wt 79.4 kg (175 lb 0.7 oz)   SpO2 100%   BMI 26.62 kg/m²     Current Shift:  No intake/output data recorded.   Last three shifts:  04/26 1901 - 04/28 0700  In: 5756.7 [P.O.:1080; I.V.:4676.7]  Out: 0             Labs: Results:       Chemistry Recent Labs     04/28/22 0400 04/27/22  0539 04/26/22 1915   * 89 80    136 136   K 3.9 3.8 3.7    105 104   CO2 27 28 27   BUN 6* 9 18   CREA 0.58* 0.69 0.80   CA 8.4* 8.3* 8.7   AGAP 7 3 5   BUCR 10* 13 23*   AP 73 70 86   TP 6.0* 5.9* 7.6   ALB 2.9* 3.1* 4.1   GLOB 3.1 2.8 3.5   AGRAT 0.9 1.1 1.2      CBC w/Diff Recent Labs     04/28/22 0400 04/27/22 0539 04/26/22 1915   WBC 6.8 6.2 8.0   RBC 4.16* 3.97* 4.30*   HGB 12.0* 11.3* 12.3*   HCT 35.8* 33.9* 37.6    138 185   GRANS  --   --  72   LYMPH  --   --  16*   EOS  --   --  3      Cardiac Enzymes No results for input(s): CPK, CKND1, BUD in the last 72 hours. No lab exists for component: CKRMB, TROIP   Coagulation Recent Labs     04/26/22 1915   PTP 14.5   INR 1.2   APTT 38.3*       Lipid Panel No results found for: CHOL, CHOLPOCT, CHOLX, CHLST, CHOLV, 845305, HDL, HDLP, LDL, LDLC, DLDLP, 614122, VLDLC, VLDL, TGLX, TRIGL, TRIGP, TGLPOCT, CHHD, CHHDX   BNP No results for input(s): BNPP in the last 72 hours.    Liver Enzymes Recent Labs     04/28/22 0400   TP 6.0*   ALB 2.9*   AP 73      Thyroid Studies Lab Results   Component Value Date/Time    TSH 1.49 03/11/2016 02:55 AM        Procedures/imaging: see electronic medical records for all procedures/Xrays and details which were not copied into this note but were reviewed prior to creation of Plan

## 2022-04-28 NOTE — PROGRESS NOTES
Nutrition Note    Received in basket due to MST. However, BPA malnutrition screening states no recent weight lost and no eating poorly due to decreased appetite with a score of 0. [facility] Weight hx per chart review:   182lb 7/21/2021 (-3.8% x 9 month)  182lb 6/25/2021 (-3.8% x 10 month)  183lb 6/16/2021 (-4.3% x 10 month)  177lb 1/7/2021 (-1.1% x 15 month)  No significant weight lost noted. Please consult if needed.      Electronically signed by Thi Neves RD on 4/28/2022 at 9:08 AM

## 2022-04-28 NOTE — PROGRESS NOTES
Bedside and Verbal shift change report given to KIMBER Flores RN (oncoming nurse) by AMBROSIO De RN (offgoing nurse). Report included the following information SBAR, Kardex, Intake/Output, MAR and Recent Results.

## 2022-04-28 NOTE — PROGRESS NOTES
[address] [facility] Pharmacokinetic Monitoring Service - Vancomycin    Consulting Provider: Michaela Hall   Indication: SSTI  Target Concentration: Goal trough of 10-15 mg/L and AUC/BIB <500 mg*hr/L  Day of Therapy: 3  Additional Antimicrobials: zosyn    Pertinent Laboratory Values: Wt Readings from Last 1 Encounters:   04/27/22 79.4 kg (175 lb 0.7 oz)     Temp Readings from Last 1 Encounters:   04/28/22 98.4 °F (36.9 °C)     No components found for: PROCAL  Estimated Creatinine Clearance: 142.5 mL/min (A) (by C-G formula based on SCr of 0.58 mg/dL (L)).   Recent Labs     04/28/22  0400 04/27/22  0539   WBC 6.8 6.2     Assessment:  Date/Time Current Dose Concentration Timing of Concentration (h) AUC   4/28/22 0400 Vancomycin 1250 mg IV q12h 11.5 mcg/ml 6 hr since last dose 348 mg*h/L   Note: Serum concentrations collected for AUC dosing may appear elevated if collected in close proximity to the dose administered, this is not necessarily an indication of toxicity    Plan:  Current dosing regimen is sub-therapeutic  Increase dose to vancomycin 1500 mg IV q12h @1000 4/28/22  Pharmacy will continue to monitor patient and adjust therapy as indicated    Thank you for the consult,  MELE Bateman  4/28/2022 9:04 AM

## 2022-04-28 NOTE — PROGRESS NOTES
Problem: Falls - Risk of  Goal: *Absence of Falls  Description: Document Hans Deepthi Fall Risk and appropriate interventions in the flowsheet.   Outcome: Progressing Towards Goal  Note: Fall Risk Interventions:            Medication Interventions: Evaluate medications/consider consulting pharmacy,Patient to call before getting OOB,Teach patient to arise slowly                   Problem: Patient Education: Go to Patient Education Activity  Goal: Patient/Family Education  Outcome: Progressing Towards Goal

## 2022-04-29 LAB
ALBUMIN SERPL-MCNC: 2.6 G/DL (ref 3.4–5)
ALBUMIN/GLOB SERPL: 0.8 {RATIO} (ref 0.8–1.7)
ALP SERPL-CCNC: 63 U/L (ref 45–117)
ALT SERPL-CCNC: 16 U/L (ref 16–61)
ANION GAP SERPL CALC-SCNC: 4 MMOL/L (ref 3–18)
AST SERPL-CCNC: 8 U/L (ref 10–38)
BILIRUB SERPL-MCNC: 0.3 MG/DL (ref 0.2–1)
BUN SERPL-MCNC: 8 MG/DL (ref 7–18)
BUN/CREAT SERPL: 11 (ref 12–20)
CALCIUM SERPL-MCNC: 8.3 MG/DL (ref 8.5–10.1)
CHLORIDE SERPL-SCNC: 112 MMOL/L (ref 100–111)
CO2 SERPL-SCNC: 27 MMOL/L (ref 21–32)
CREAT SERPL-MCNC: 0.75 MG/DL (ref 0.6–1.3)
ERYTHROCYTE [DISTWIDTH] IN BLOOD BY AUTOMATED COUNT: 12.6 % (ref 11.6–14.5)
GLOBULIN SER CALC-MCNC: 3.4 G/DL (ref 2–4)
GLUCOSE SERPL-MCNC: 118 MG/DL (ref 74–99)
HCT VFR BLD AUTO: 33.4 % (ref 36–48)
HGB BLD-MCNC: 11.2 G/DL (ref 13–16)
MCH RBC QN AUTO: 28.7 PG (ref 24–34)
MCHC RBC AUTO-ENTMCNC: 33.5 G/DL (ref 31–37)
MCV RBC AUTO: 85.6 FL (ref 78–100)
NRBC # BLD: 0 K/UL (ref 0–0.01)
NRBC BLD-RTO: 0 PER 100 WBC
PLATELET # BLD AUTO: 188 K/UL (ref 135–420)
PMV BLD AUTO: 10.5 FL (ref 9.2–11.8)
POTASSIUM SERPL-SCNC: 3.2 MMOL/L (ref 3.5–5.5)
PROT SERPL-MCNC: 6 G/DL (ref 6.4–8.2)
RBC # BLD AUTO: 3.9 M/UL (ref 4.35–5.65)
SODIUM SERPL-SCNC: 143 MMOL/L (ref 136–145)
WBC # BLD AUTO: 5.8 K/UL (ref 4.6–13.2)

## 2022-04-29 PROCEDURE — 65270000029 HC RM PRIVATE

## 2022-04-29 PROCEDURE — 36415 COLL VENOUS BLD VENIPUNCTURE: CPT

## 2022-04-29 PROCEDURE — 74011250636 HC RX REV CODE- 250/636: Performed by: EMERGENCY MEDICINE

## 2022-04-29 PROCEDURE — 74011000250 HC RX REV CODE- 250: Performed by: FAMILY MEDICINE

## 2022-04-29 PROCEDURE — 74011000258 HC RX REV CODE- 258: Performed by: FAMILY MEDICINE

## 2022-04-29 PROCEDURE — 74011250637 HC RX REV CODE- 250/637: Performed by: HOSPITALIST

## 2022-04-29 PROCEDURE — 80053 COMPREHEN METABOLIC PANEL: CPT

## 2022-04-29 PROCEDURE — 74011250637 HC RX REV CODE- 250/637: Performed by: FAMILY MEDICINE

## 2022-04-29 PROCEDURE — 74011250636 HC RX REV CODE- 250/636: Performed by: FAMILY MEDICINE

## 2022-04-29 PROCEDURE — 85027 COMPLETE CBC AUTOMATED: CPT

## 2022-04-29 RX ORDER — OXYCODONE HYDROCHLORIDE 5 MG/1
5 TABLET ORAL
Status: DISCONTINUED | OUTPATIENT
Start: 2022-04-29 | End: 2022-04-30 | Stop reason: HOSPADM

## 2022-04-29 RX ORDER — KETOROLAC TROMETHAMINE 15 MG/ML
15 INJECTION, SOLUTION INTRAMUSCULAR; INTRAVENOUS
Status: DISCONTINUED | OUTPATIENT
Start: 2022-04-29 | End: 2022-04-30 | Stop reason: HOSPADM

## 2022-04-29 RX ORDER — POTASSIUM CHLORIDE 20 MEQ/1
40 TABLET, EXTENDED RELEASE ORAL
Status: ACTIVE | OUTPATIENT
Start: 2022-04-29 | End: 2022-04-29

## 2022-04-29 RX ADMIN — FAMOTIDINE 20 MG: 20 TABLET, FILM COATED ORAL at 20:13

## 2022-04-29 RX ADMIN — Medication 1 TABLET: at 20:13

## 2022-04-29 RX ADMIN — OXYCODONE 5 MG: 5 TABLET ORAL at 12:01

## 2022-04-29 RX ADMIN — OXYCODONE 10 MG: 5 TABLET ORAL at 00:00

## 2022-04-29 RX ADMIN — SODIUM CHLORIDE, PRESERVATIVE FREE 10 ML: 5 INJECTION INTRAVENOUS at 21:04

## 2022-04-29 RX ADMIN — VANCOMYCIN HYDROCHLORIDE 1500 MG: 100 INJECTION, POWDER, LYOPHILIZED, FOR SOLUTION INTRAVENOUS at 10:35

## 2022-04-29 RX ADMIN — OXYCODONE 10 MG: 5 TABLET ORAL at 05:49

## 2022-04-29 RX ADMIN — PIPERACILLIN AND TAZOBACTAM 3.38 G: 3; .375 INJECTION, POWDER, LYOPHILIZED, FOR SOLUTION INTRAVENOUS at 20:13

## 2022-04-29 RX ADMIN — VANCOMYCIN HYDROCHLORIDE 1500 MG: 100 INJECTION, POWDER, LYOPHILIZED, FOR SOLUTION INTRAVENOUS at 21:04

## 2022-04-29 RX ADMIN — SODIUM CHLORIDE, SODIUM LACTATE, POTASSIUM CHLORIDE, AND CALCIUM CHLORIDE 100 ML/HR: 600; 310; 30; 20 INJECTION, SOLUTION INTRAVENOUS at 05:49

## 2022-04-29 RX ADMIN — PIPERACILLIN AND TAZOBACTAM 3.38 G: 3; .375 INJECTION, POWDER, LYOPHILIZED, FOR SOLUTION INTRAVENOUS at 01:47

## 2022-04-29 RX ADMIN — HYDROMORPHONE HYDROCHLORIDE 1 MG: 2 INJECTION, SOLUTION INTRAMUSCULAR; INTRAVENOUS; SUBCUTANEOUS at 01:47

## 2022-04-29 NOTE — ROUTINE PROCESS
Bedside and Verbal shift change report given to Abrahan Hill RN  (oncoming nurse) by Nahun Elder RN  (offgoing nurse). Report given with SBAR, Kardex, Intake/Output and Recent Results.

## 2022-04-29 NOTE — PROGRESS NOTES
0730: report given to this nurse from [address] [facility]    7630: verbal order given to this nurse to move pt to medical unit  Shirlene Freire RN    0900: pt refuses majority of assessment and medication at this time, refuses medication at this time  Shirlene Freire RN

## 2022-04-29 NOTE — PROGRESS NOTES
[id number] Shift Summary: Pt rested well overnight with c/o pain to his left arm. Medicated per MAR. No new clinical concerns noted.      Nightshift Chart Audit Completed

## 2022-04-29 NOTE — PROGRESS NOTES
D/C Plan: Discharge home with physician follow up when medically appropriate    CM notes patient remains on 3N for treatment of cellulitis. CM anticipates patient will discharge over the weekend with oral ABX. Care Management Interventions  PCP Verified by CM: Yes (Per Chart Review: Antonio Narayanan. )  Mode of Transport at Discharge: Other (see comment) (Family to transport. )  Transition of Care Consult (CM Consult):  Other (Home w/ outpt infusion center if discharging on IV abx as pt will have to have a new IV started daily if he d/c's w/ IV abx needs. )  Discharge Durable Medical Equipment: No  Physical Therapy Consult: No  Occupational Therapy Consult: No  Support Systems: Spouse/Significant Other  Confirm Follow Up Transport: Self  The Plan for Transition of Care is Related to the Following Treatment Goals : Home  Discharge Location  Patient Expects to be Discharged to[de-identified] Home

## 2022-04-29 NOTE — PROGRESS NOTES
Problem: Falls - Risk of  Goal: *Absence of Falls  Description: Document Marcella Grider Fall Risk and appropriate interventions in the flowsheet.   Outcome: Progressing Towards Goal  Note: Fall Risk Interventions:            Medication Interventions: Evaluate medications/consider consulting pharmacy,Patient to call before getting OOB,Teach patient to arise slowly                   Problem: Patient Education: Go to Patient Education Activity  Goal: Patient/Family Education  Outcome: Progressing Towards Goal     Problem: Opiate Withdrawal  Goal: *STG: Participates in treatment plan  Outcome: Progressing Towards Goal  Goal: *STG: Remains safe in hospital  Outcome: Progressing Towards Goal  Goal: *STG: Seeks staff when symptoms of withdrawal increase  Outcome: Progressing Towards Goal  Goal: *STG: Complies with medication therapy  Outcome: Progressing Towards Goal  Goal: Interventions  Outcome: Progressing Towards Goal     Problem: General Infection Care Plan (Adult and Pediatric)  Goal: Improvement in signs and symptoms of infection  Outcome: Progressing Towards Goal  Goal: *Optimize nutritional status  Outcome: Progressing Towards Goal     Problem: Patient Education: Go to Patient Education Activity  Goal: Patient/Family Education  Outcome: Progressing Towards Goal     Problem: Pain  Goal: *Control of Pain  Outcome: Progressing Towards Goal     Problem: Patient Education: Go to Patient Education Activity  Goal: Patient/Family Education  Outcome: Progressing Towards Goal

## 2022-04-29 NOTE — PROGRESS NOTES
Hospitalist Progress Note    Patient: Wilfredo Burr MRN: [id number]  CSN: [id number]    YOB: 1985  Age: 29 y.o. Sex: male    DOA: 4/26/2022 LOS:  LOS: 3 days          Chief Complaint:    Cellulitis of arm      Assessment/Plan     Right arm swelling     78-year-old male who is a smoker with polysubstance abuse is admitted for right arm cellulitis where he usually injects heroin.     RUE swelling/cellulitis -improved  Continue with antibiotics  RUE duplex negative for DVT  Keep arm elevated, ice regularly     Polysubstance abuse -  Heroin  W/drawal protocol    Hypokalemia-PO potassium     DVT prophylaxis with lovenox    Home 24-48 hrs    toradol before oxycodone    medsurg bed      Disposition :  Patient Active Problem List   Diagnosis Code    Polysubstance abuse ([facility] [address] F19.10    Alcohol abuse F10.10    Hepatitis-C B19.20    Right arm cellulitis L03. 113    Smoker F17.200       Subjective:    He complains of right arm swelling and pain still, but does admit it is better overall  He is very sleepy, states he is \"just coming off meds\"      Review of systems:    Constitutional: denies fevers, chills  Respiratory: denies SOB, cough  Cardiovascular: denies chest pain, palpitations  Gastrointestinal: denies nausea, vomiting, diarrhea  No tremors      Vital signs/Intake and Output:  Visit Vitals  /78 (BP 1 Location: Left upper arm, BP Patient Position: At rest)   Pulse 69   Temp 97.5 °F (36.4 °C)   Resp 20   Ht 5' 8\" (1.727 m)   Wt 79.4 kg (175 lb 0.7 oz)   SpO2 100%   BMI 26.62 kg/m²     Current Shift:  No intake/output data recorded.   Last three shifts:  04/27 1901 - 04/29 0700  In: 3506.7 [P.O.:480; I.V.:3026.7]  Out: -     Exam:    General: Well developed, alert, NAD, OX3  CVS:Regular rate and rhythm, no M/R/G, S1/S2 heard, no thrill  Lungs:Clear to auscultation bilaterally, no wheezes, rhonchi, or rales  Extremities: swelling minimal and no redness right upper arm, minimal induration at right [facility] fossa  Neuro:grossly normal , follows commands  Psych:appropriate                Labs: Results:       Chemistry Recent Labs     04/29/22 0302 04/28/22 0400 04/27/22 0539   * 105* 89    139 136   K 3.2* 3.9 3.8   * 105 105   CO2 27 27 28   BUN 8 6* 9   CREA 0.75 0.58* 0.69   CA 8.3* 8.4* 8.3*   AGAP 4 7 3   BUCR 11* 10* 13   AP 63 73 70   TP 6.0* 6.0* 5.9*   ALB 2.6* 2.9* 3.1*   GLOB 3.4 3.1 2.8   AGRAT 0.8 0.9 1.1      CBC w/Diff Recent Labs     04/29/22 0302 04/28/22 0400 04/27/22 0539 04/26/22 1915 04/26/22 1915   WBC 5.8 6.8 6.2   < > 8.0   RBC 3.90* 4.16* 3.97*   < > 4.30*   HGB 11.2* 12.0* 11.3*   < > 12.3*   HCT 33.4* 35.8* 33.9*   < > 37.6    177 138   < > 185   GRANS  --   --   --   --  72   LYMPH  --   --   --   --  16*   EOS  --   --   --   --  3    < > = values in this interval not displayed. Cardiac Enzymes No results for input(s): CPK, CKND1, BUD in the last 72 hours. No lab exists for component: CKRMB, TROIP   Coagulation Recent Labs     04/26/22 1915   PTP 14.5   INR 1.2   APTT 38.3*       Lipid Panel No results found for: CHOL, CHOLPOCT, CHOLX, CHLST, CHOLV, 161158, HDL, HDLP, LDL, LDLC, DLDLP, 687333, VLDLC, VLDL, TGLX, TRIGL, TRIGP, TGLPOCT, CHHD, CHHDX   BNP No results for input(s): BNPP in the last 72 hours.    Liver Enzymes Recent Labs     04/29/22 0302   TP 6.0*   ALB 2.6*   AP 63      Thyroid Studies Lab Results   Component Value Date/Time    TSH 1.49 03/11/2016 02:55 AM        Procedures/imaging: see electronic medical records for all procedures/Xrays and details which were not copied into this note but were reviewed prior to creation of Tyson Cohen MD

## 2022-04-30 ENCOUNTER — HOSPITAL ENCOUNTER (EMERGENCY)
Age: 37
Discharge: HOME | End: 2022-04-30
Attending: EMERGENCY MEDICINE
Payer: COMMERCIAL

## 2022-04-30 VITALS
RESPIRATION RATE: 18 BRPM | DIASTOLIC BLOOD PRESSURE: 74 MMHG | SYSTOLIC BLOOD PRESSURE: 117 MMHG | OXYGEN SATURATION: 100 % | BODY MASS INDEX: 26.53 KG/M2 | WEIGHT: 175.04 LBS | HEIGHT: 68 IN | HEART RATE: 72 BPM | TEMPERATURE: 98 F

## 2022-04-30 VITALS
WEIGHT: 175 LBS | HEART RATE: 80 BPM | RESPIRATION RATE: 18 BRPM | HEIGHT: 68 IN | OXYGEN SATURATION: 99 % | SYSTOLIC BLOOD PRESSURE: 148 MMHG | TEMPERATURE: 98.1 F | DIASTOLIC BLOOD PRESSURE: 98 MMHG | BODY MASS INDEX: 26.52 KG/M2

## 2022-04-30 DIAGNOSIS — L03.113 CELLULITIS OF RIGHT UPPER EXTREMITY: Primary | ICD-10-CM

## 2022-04-30 PROCEDURE — 99283 EMERGENCY DEPT VISIT LOW MDM: CPT

## 2022-04-30 PROCEDURE — 74011000258 HC RX REV CODE- 258: Performed by: FAMILY MEDICINE

## 2022-04-30 PROCEDURE — 74011250636 HC RX REV CODE- 250/636: Performed by: FAMILY MEDICINE

## 2022-04-30 RX ORDER — AMOXICILLIN AND CLAVULANATE POTASSIUM 875; 125 MG/1; MG/1
1 TABLET, FILM COATED ORAL 2 TIMES DAILY
Qty: 20 TABLET | Refills: 0 | Status: SHIPPED | OUTPATIENT
Start: 2022-04-30 | End: 2022-05-10

## 2022-04-30 RX ORDER — SULFAMETHOXAZOLE AND TRIMETHOPRIM 800; 160 MG/1; MG/1
1 TABLET ORAL 2 TIMES DAILY
Qty: 20 TABLET | Refills: 0 | Status: SHIPPED | OUTPATIENT
Start: 2022-04-30 | End: 2022-05-10

## 2022-04-30 RX ADMIN — PIPERACILLIN AND TAZOBACTAM 3.38 G: 3; .375 INJECTION, POWDER, LYOPHILIZED, FOR SOLUTION INTRAVENOUS at 01:01

## 2022-04-30 NOTE — ED PROVIDER NOTES
69-year-old male past medical history of hepatitis ADHD anxiety seizures and heroin abuse presents to the emergency department with right arm pain. Patient has been admitted here for 4 days he had cellulitis which has improved. He is on IV antibiotics. Movement made it worse better with rest.  Patient told nurse that he signed out AMA because he wanted to go outside and use heroin because the Percocet was not working. Patient is here today because he wants to be admitted to the hospital.  Patient has no other complaints. Past Medical History:   Diagnosis Date    Drug abuse, IV ([address]     Heroin abuse ([address]     Liver disease     Hepatitis C - treated    Psychiatric diagnosis     ADHD    Psychiatric diagnosis     drug abuse.  Psychiatric disorder     anxiety    Seizures ([facility] [address]     related to substance withdrawal    Sleep apnea     no cpap - not formally diagnosed       No past surgical history on file.       Family History:   Problem Relation Age of Onset    No Known Problems Mother     Heart Disease Father        Social History     Socioeconomic History    Marital status: SINGLE     Spouse name: Not on file    Number of children: Not on file    Years of education: Not on file    Highest education level: Not on file   Occupational History    Not on file   Tobacco Use    Smoking status: Current Every Day Smoker     Packs/day: 1.00    Smokeless tobacco: Never Used   Vaping Use    Vaping Use: Former   Substance and Sexual Activity    Alcohol use: Yes     Comment: daily    Drug use: Yes     Types: Heroin, Prescription, Cocaine    Sexual activity: Not on file   Other Topics Concern    Not on file   Social History Narrative    ** Merged History Encounter **          Social Determinants of Health     Financial Resource Strain:     Difficulty of Paying Living Expenses: Not on file   Food Insecurity:     Worried About [address] in the Last Year: Not on file    [address] in the Last Year: Not on file   Transportation Needs:     Lack of Transportation (Medical): Not on file    Lack of Transportation (Non-Medical): Not on file   Physical Activity:     Days of Exercise per Week: Not on file    Minutes of Exercise per Session: Not on file   Stress:     Feeling of Stress : Not on file   Social Connections:     Frequency of Communication with Friends and Family: Not on file    Frequency of Social Gatherings with Friends and Family: Not on file    Attends Hindu Services: Not on file    Active Member of [address] [place] [address] [employer] or Organizations: Not on file    Attends Club or Organization Meetings: Not on file    Marital Status: Not on file   Intimate Partner Violence:     Fear of Current or Ex-Partner: Not on file    Emotionally Abused: Not on file    Physically Abused: Not on file    Sexually Abused: Not on file   Housing Stability:     Unable to Pay for Housing in the Last Year: Not on file    Number of [place] in the Last Year: Not on file    Unstable Housing in the Last Year: Not on file         ALLERGIES: Patient has no known allergies. Review of Systems   Constitutional: Negative. Respiratory: Negative. Gastrointestinal: Negative. Musculoskeletal: Negative. Hematological: Negative. Psychiatric/Behavioral: Negative. All other systems reviewed and are negative. Vitals:    04/30/22 0304 04/30/22 0348   BP: (!) 148/98    Pulse: 80    Resp: 18    Temp: 98.1 °F (36.7 °C)    SpO2: 99%    Weight:  79.4 kg (175 lb)   Height:  5' 8\" (1.727 m)            Physical Exam  Vitals and nursing note reviewed. Constitutional:       General: He is not in acute distress. Appearance: Normal appearance. He is not ill-appearing, toxic-appearing or diaphoretic. HENT:      Head: Normocephalic and atraumatic. Nose: Nose normal.   Eyes:      Extraocular Movements: Extraocular movements intact. Cardiovascular:      Rate and Rhythm: Normal rate and regular rhythm. Pulmonary:      Effort: Pulmonary effort is normal. No respiratory distress. Breath sounds: Normal breath sounds. No stridor. No wheezing, rhonchi or rales. Chest:      Chest wall: No tenderness. Abdominal:      General: There is no distension. Palpations: Abdomen is soft. There is no mass. Tenderness: There is no abdominal tenderness. There is no right CVA tenderness, left CVA tenderness, guarding or rebound. Hernia: No hernia is present. Musculoskeletal:         General: Swelling present. No tenderness, deformity or signs of injury. Normal range of motion. Cervical back: Normal range of motion and neck supple. Right lower leg: No edema. Left lower leg: No edema. Comments: Right arm mildly swollen good pulses no erythema no tenderness. Skin:     General: Skin is warm. Capillary Refill: Capillary refill takes less than 2 seconds. Neurological:      General: No focal deficit present. Mental Status: He is alert. MDM  Number of Diagnoses or Management Options  Diagnosis management comments: I spoke to Dr. Chandler Kowalski who stated the patient was going to be discharged today. She was going to send him home on Bactrim and Augmentin. Patient looks well normal vitals. She told me he was not bacteremic will discharge home.          Procedures

## 2022-04-30 NOTE — ED TRIAGE NOTES
Pt stated he waked out of the hospital today bc he was mad and needed air bc the with drawls were getting the best of him and the oxycodone he received here didn't help much , pt stated he was being a hot head when he left and is now back to get iv antibiotics and be re admitted. Pt noted to be jittery in triage and pupils dilated pt denies drug or etoh use stated he did take Suboxon  today here.  Pt stated his right arm is sore

## 2022-04-30 NOTE — PROGRESS NOTES
Patient became very restless and anxious around 130 am, patient was observed on his cell phone speaking to someone and was pacing around the elevator doors around 135 am, patient had disconnected himself from the IV ABT that had been running. Patient was educated several times in regards of contacting staff if he needed anything and not to exit the unit, Patient refused to stay in his room to finish his IV ABT, he had been offered snacks, hydration, pain meds etc. He did not want anything, he refused. Around 145 am patient was found running towards the elevators again, he then stated that he decided to call a ride and go home. Patient went AMA and refused to sign paperwork. His IV was removed by the charge nurse before leaving the building. MD, Nursing Supervisor, and Security was made aware of patient decision. Patient took all his personal belongings with him as well. Patient understood the risks of leaving AMA.

## 2022-04-30 NOTE — DISCHARGE SUMMARY
Discharge Summary    Patient: Shari Brandon MRN: [id number]  CSN: [id number]    YOB: 1985  Age: 29 y.o. Sex: male    DOA: 4/26/2022 LOS:  LOS: 4 days   Discharge Date:      Primary Care Provider:  Shireen Plasencia MD    Admission Diagnoses: Cellulitis [L03.90]    Discharge Diagnoses:    Problem List as of 4/30/2022 Date Reviewed: 4/26/2022          Codes Class Noted - Resolved    * (Principal) Right arm cellulitis ICD-10-CM: L03.113  ICD-9-CM: 682.3  4/27/2022 - Present        Smoker ICD-10-CM: F17.200  ICD-9-CM: 305.1  4/27/2022 - Present        Alcohol abuse ICD-10-CM: F10.10  ICD-9-CM: 305.00  7/21/2021 - Present        Hepatitis-C ICD-10-CM: B19.20  ICD-9-CM: 070.70  7/21/2021 - Present        Polysubstance abuse ([address] ICD-10-CM: F19.10  ICD-9-CM: 305.90  6/15/2013 - Present        RESOLVED: Hyperkalemia ICD-10-CM: E87.5  ICD-9-CM: 276.7  3/10/2016 - 3/16/2016              Discharge Medications:     Current Discharge Medication List          Discharge Condition: Stable    Procedures : none    Consults: None      PHYSICAL EXAM   Visit Vitals  /74 (BP 1 Location: Left upper arm, BP Patient Position: At rest)   Pulse 72   Temp 98 °F (36.7 °C)   Resp 18   Ht 5' 8\" (1.727 m)   Wt 79.4 kg (175 lb 0.7 oz)   SpO2 100%   BMI 26.62 kg/m²     Not done as he left AGAINST MEDICAL ADVICE. Admission HPI : Shari Brandon is a 29 y.o. male smoker with polysubstance abuse presents to the ED for right arm swelling since yesterday at his usual injection site. He is right-hand dominant and works as a . He injects cocaine and heroin regularly. He denies alcohol use. He went to the [facility] earlier this year for drug rehab but is not interested in going back at this time. He has had fever, chills, and body aches. He denies any chest pain, shortness of breath, or vomiting.     Hospital Course : He was admitted and started on IV antibiotics with vancomycin and zosyn for right upper extremity cellulitis at his heroin injection site. He had a duplex doppler done which was negative for DVT. His blood cultures are no growth to date. On hospital day 4, he unfortunately left AGAINST MEDICAL ADVICE. He declined to sign any paperwork. Activity: Activity as tolerated    Diet: Regular Diet    Follow-up: none    Disposition: AGAINST MEDICAL ADVICE    Minutes spent on discharge: 20 minutes       Labs: Results:       Chemistry Recent Labs     04/29/22 0302 04/28/22  0400 04/27/22  0539   * 105* 89    139 136   K 3.2* 3.9 3.8   * 105 105   CO2 27 27 28   BUN 8 6* 9   CREA 0.75 0.58* 0.69   CA 8.3* 8.4* 8.3*   AGAP 4 7 3   BUCR 11* 10* 13   AP 63 73 70   TP 6.0* 6.0* 5.9*   ALB 2.6* 2.9* 3.1*   GLOB 3.4 3.1 2.8   AGRAT 0.8 0.9 1.1      CBC w/Diff Recent Labs     04/29/22 0302 04/28/22  0400 04/27/22  0539   WBC 5.8 6.8 6.2   RBC 3.90* 4.16* 3.97*   HGB 11.2* 12.0* 11.3*   HCT 33.4* 35.8* 33.9*    177 138      Cardiac Enzymes No results for input(s): CPK, CKND1, BUD in the last 72 hours. No lab exists for component: CKRMB, TROIP   Coagulation No results for input(s): PTP, INR, APTT, INREXT, INREXT in the last 72 hours. Lipid Panel No results found for: CHOL, CHOLPOCT, CHOLX, CHLST, CHOLV, 484893, HDL, HDLP, LDL, LDLC, DLDLP, 914187, VLDLC, VLDL, TGLX, TRIGL, TRIGP, TGLPOCT, CHHD, CHHDX   BNP No results for input(s): BNPP in the last 72 hours. Liver Enzymes Recent Labs     04/29/22 0302   TP 6.0*   ALB 2.6*   AP 63      Thyroid Studies Lab Results   Component Value Date/Time    TSH 1.49 03/11/2016 02:55 AM            Significant Diagnostic Studies: XR ELBOW RT MIN 3 V    Result Date: 4/27/2022  EXAM:  RIGHT ELBOW INDICATION:  Right elbow pain. TECHNIQUE:  3 views. _______________________________ FINDINGS:  No acute fracture, dislocation or joint effusion.  _______________________________     Unremarkable right elbow.    XR TIB/FIB RT    Result Date: 4/27/2022  EXAM:  RIGHT TIBIA/FIBULA INDICATION:  Right lower leg pain. TECHNIQUE:  Two views, 2 films. __________________________________ FINDINGS:  No acute fracture or dislocation. No radiopaque soft tissue foreign body or significant focal soft tissue swelling. __________________________________     Unremarkable right tibia and fibula. XR CHEST PORT    Result Date: 4/27/2022  EXAM:  PORTABLE CHEST INDICATION:  Chest pain. TECHNIQUE:  Portable, AP view. COMPARISON:  07/24/2021 ____________________ FINDINGS:  SUPPORT DEVICES: None. HEART AND MEDIASTINUM: Cardiomediastinal silhouette appears within normal limits. Normal caliber thoracic aorta. LUNGS AND PLEURAL SPACES: Lungs are well aerated with no confluent airspace opacity or pulmonary edema. No pleural effusion or pneumothorax. Resolution of previous patchy pulmonary opacities. BONY THORAX AND SOFT TISSUES: No acute osseous abnormality. ____________________     No acute cardiopulmonary disease. *    **     CT HUMERUS RT W CONT    Result Date: 4/26/2022  EXAMINATION:  CT right humerus with contrast COMPARISON: None HISTORY: Right arm swelling TECHNIQUE: Contrast-enhanced CT of the right humerus . Multiplanar constructions provided. One or more dose reduction techniques were used on this CT: automated exposure control, adjustment of the mAs and/or kVp according to patient size, and iterative reconstruction techniques. The specific techniques used on this CT exam have been documented in the patient's electronic medical record. Digital Imaging and Communications in Medicine (DICOM) format image data are available to nonaffiliated external healthcare facilities or entities on a secure, media free, reciprocally searchable basis with patient authorization for at least a 12-month period after this study. Geoffrey Mckeon FINDINGS: Diffuse soft tissue superficial subcutaneous edema/cellulitis involving the forearm and anterior arm. Heterogeneous, edematous appearance of the bicep musculature. No drainable fluid collection. No subcutaneous gas identified. Visualized vascular structures enhance normally with contrast. Visualized osseous structures intact without acute abnormality. .     Marked edematous, heterogeneous appearance of the biceps musculature with adjacent deep and subcutaneous edema , with superficial edema extending into the forearm as well. These findings are relatively nonspecific. Did patient have trauma or other injury to explain these findings? Infection felt to be less likely but not entirely excluded. No subcutaneous gas to suggest necrotizing fasciitis. No drainable fluid collection or discrete hematoma. DUPLEX UPPER EXT VENOUS RIGHT    Result Date: 4/28/2022  · No evidence of acute DVT and chronic DVT in the right upper extremity. No results found for this or any previous visit.         CC: Pete Blackman MD

## 2022-05-02 LAB
BACTERIA SPEC CULT: NORMAL
BACTERIA SPEC CULT: NORMAL
SERVICE CMNT-IMP: NORMAL
SERVICE CMNT-IMP: NORMAL

## 2022-07-13 ENCOUNTER — HOSPITAL ENCOUNTER (EMERGENCY)
Age: 37
Discharge: HOME | End: 2022-07-14
Attending: EMERGENCY MEDICINE
Payer: COMMERCIAL

## 2022-07-13 VITALS
DIASTOLIC BLOOD PRESSURE: 77 MMHG | RESPIRATION RATE: 26 BRPM | HEIGHT: 67 IN | HEART RATE: 94 BPM | WEIGHT: 165 LBS | TEMPERATURE: 98.9 F | OXYGEN SATURATION: 97 % | SYSTOLIC BLOOD PRESSURE: 119 MMHG | BODY MASS INDEX: 25.9 KG/M2

## 2022-07-13 DIAGNOSIS — R07.9 CHEST PAIN, UNSPECIFIED TYPE: Primary | ICD-10-CM

## 2022-07-13 LAB
BASOPHILS # BLD: 0 K/UL (ref 0–0.1)
BASOPHILS NFR BLD: 0 % (ref 0–2)
DIFFERENTIAL METHOD BLD: ABNORMAL
EOSINOPHIL # BLD: 0.1 K/UL (ref 0–0.4)
EOSINOPHIL NFR BLD: 1 % (ref 0–5)
ERYTHROCYTE [DISTWIDTH] IN BLOOD BY AUTOMATED COUNT: 12.5 % (ref 11.6–14.5)
HCT VFR BLD AUTO: 37.8 % (ref 36–48)
HGB BLD-MCNC: 12.7 G/DL (ref 13–16)
IMM GRANULOCYTES # BLD AUTO: 0 K/UL (ref 0–0.04)
IMM GRANULOCYTES NFR BLD AUTO: 1 % (ref 0–0.5)
LYMPHOCYTES # BLD: 0.8 K/UL (ref 0.9–3.6)
LYMPHOCYTES NFR BLD: 17 % (ref 21–52)
MCH RBC QN AUTO: 28.3 PG (ref 24–34)
MCHC RBC AUTO-ENTMCNC: 33.6 G/DL (ref 31–37)
MCV RBC AUTO: 84.2 FL (ref 78–100)
MONOCYTES # BLD: 0.4 K/UL (ref 0.05–1.2)
MONOCYTES NFR BLD: 8 % (ref 3–10)
NEUTS SEG # BLD: 3.6 K/UL (ref 1.8–8)
NEUTS SEG NFR BLD: 72 % (ref 40–73)
NRBC # BLD: 0 K/UL (ref 0–0.01)
NRBC BLD-RTO: 0 PER 100 WBC
PLATELET # BLD AUTO: 196 K/UL (ref 135–420)
PMV BLD AUTO: 9.9 FL (ref 9.2–11.8)
RBC # BLD AUTO: 4.49 M/UL (ref 4.35–5.65)
WBC # BLD AUTO: 4.9 K/UL (ref 4.6–13.2)

## 2022-07-13 PROCEDURE — 84484 ASSAY OF TROPONIN QUANT: CPT

## 2022-07-13 PROCEDURE — 80053 COMPREHEN METABOLIC PANEL: CPT

## 2022-07-13 PROCEDURE — 85025 COMPLETE CBC W/AUTO DIFF WBC: CPT

## 2022-07-13 PROCEDURE — 99285 EMERGENCY DEPT VISIT HI MDM: CPT

## 2022-07-13 PROCEDURE — 93005 ELECTROCARDIOGRAM TRACING: CPT

## 2022-07-14 ENCOUNTER — APPOINTMENT (OUTPATIENT)
Dept: CT IMAGING | Age: 37
End: 2022-07-14
Attending: EMERGENCY MEDICINE
Payer: COMMERCIAL

## 2022-07-14 ENCOUNTER — APPOINTMENT (OUTPATIENT)
Dept: GENERAL RADIOLOGY | Age: 37
End: 2022-07-14
Attending: EMERGENCY MEDICINE
Payer: COMMERCIAL

## 2022-07-14 LAB
ALBUMIN SERPL-MCNC: 4 G/DL (ref 3.4–5)
ALBUMIN/GLOB SERPL: 1.1 {RATIO} (ref 0.8–1.7)
ALP SERPL-CCNC: 79 U/L (ref 45–117)
ALT SERPL-CCNC: 21 U/L (ref 16–61)
ANION GAP SERPL CALC-SCNC: 5 MMOL/L (ref 3–18)
AST SERPL-CCNC: 19 U/L (ref 10–38)
BILIRUB SERPL-MCNC: 0.2 MG/DL (ref 0.2–1)
BUN SERPL-MCNC: 17 MG/DL (ref 7–18)
BUN/CREAT SERPL: 18 (ref 12–20)
CALCIUM SERPL-MCNC: 9 MG/DL (ref 8.5–10.1)
CHLORIDE SERPL-SCNC: 107 MMOL/L (ref 100–111)
CO2 SERPL-SCNC: 26 MMOL/L (ref 21–32)
CREAT SERPL-MCNC: 0.92 MG/DL (ref 0.6–1.3)
GLOBULIN SER CALC-MCNC: 3.6 G/DL (ref 2–4)
GLUCOSE SERPL-MCNC: 103 MG/DL (ref 74–99)
POTASSIUM SERPL-SCNC: 4.3 MMOL/L (ref 3.5–5.5)
PROT SERPL-MCNC: 7.6 G/DL (ref 6.4–8.2)
SODIUM SERPL-SCNC: 138 MMOL/L (ref 136–145)
TROPONIN-HIGH SENSITIVITY: 4 NG/L (ref 0–78)

## 2022-07-14 PROCEDURE — 74011000636 HC RX REV CODE- 636: Performed by: EMERGENCY MEDICINE

## 2022-07-14 PROCEDURE — 71046 X-RAY EXAM CHEST 2 VIEWS: CPT

## 2022-07-14 PROCEDURE — 74177 CT ABD & PELVIS W/CONTRAST: CPT

## 2022-07-14 RX ADMIN — IOPAMIDOL 100 ML: 612 INJECTION, SOLUTION INTRAVENOUS at 01:43

## 2022-07-14 NOTE — ED TRIAGE NOTES
Pt states he has had right sided sharp chest pain since yesterday.  States he uses heroin daily and pain was worse after using today

## 2022-07-14 NOTE — ED PROVIDER NOTES
60-year-old male past medical history of heroin abuse presents to the emergency department with right abdominal pain. Patient has right upper abdominal pain after using heroin. Patient injected heroin into his arms. Pain is constant nonradiating he has nausea no vomiting no fevers no chills no diarrhea. He has had this in the past.  He still states this pain with any heroin use. Patient denies trauma. Treatment with medications. Issues expressed. Past Medical History:   Diagnosis Date    Drug [facility]     [facility]     Liver disease     Hepatitis C - treated    Psychiatric diagnosis     ADHD    Psychiatric diagnosis     drug abuse.  Psychiatric disorder     anxiety    Seizures ([facility]     related to substance withdrawal    Sleep apnea     no cpap - not formally diagnosed       No past surgical history on file.       Family History:   Problem Relation Age of Onset    No Known Problems Mother     Heart Disease Father        Social History     Socioeconomic History    Marital status: SINGLE     Spouse name: Not on file    Number of children: Not on file    Years of education: Not on file    Highest education level: Not on file   Occupational History    Not on file   Tobacco Use    Smoking status: Current Every Day Smoker     Packs/day: 1.00    Smokeless tobacco: Never Used   Vaping Use    Vaping Use: Former   Substance and Sexual Activity    Alcohol use: Yes     Comment: daily    Drug use: Yes     Types: Heroin, Prescription, Cocaine    Sexual activity: Not on file   Other Topics Concern    Not on file   Social History Narrative    ** Merged History Encounter **          Social Determinants of Health     Financial Resource Strain:     Difficulty of Paying Living Expenses: Not on file   Food Insecurity:     Worried About Running Out of Food in the Last Year: Not on file    Jose of Food in the Last Year: Not on file   Transportation Needs:     Lack of Transportation (Medical): Not on file    Lack of Transportation (Non-Medical): Not on file   Physical Activity:     Days of Exercise per Week: Not on file    Minutes of Exercise per Session: Not on file   Stress:     Feeling of Stress : Not on file   Social Connections:     Frequency of Communication with Friends and Family: Not on file    Frequency of Social Gatherings with Friends and Family: Not on file    Attends [employer] Services: Not on file    Active Member of [address] [place] [address] or Organizations: Not on file    Attends Club or Organization Meetings: Not on file    Marital Status: Not on file   Intimate Partner Violence:     Fear of Current or Ex-Partner: Not on file    Emotionally Abused: Not on file    Physically Abused: Not on file    Sexually Abused: Not on file   Housing Stability:     Unable to Pay for Housing in the Last Year: Not on file    Number of [place] in the Last Year: Not on file    Unstable Housing in the Last Year: Not on file         ALLERGIES: Patient has no known allergies. Review of Systems   Constitutional: Negative. HENT: Negative. Respiratory: Negative. Gastrointestinal: Positive for abdominal pain. Genitourinary: Negative. Musculoskeletal: Negative. Neurological: Negative. Hematological: Negative. Psychiatric/Behavioral: Negative. All other systems reviewed and are negative. Vitals:    07/13/22 2324   BP: 119/77   Pulse: 94   Resp: 26   Temp: 98.9 °F (37.2 °C)   SpO2: 97%   Weight: 74.8 kg (165 lb)   Height: 5' 7\" (1.702 m)            Physical Exam  Vitals and nursing note reviewed. Constitutional:       General: He is not in acute distress. Appearance: He is well-developed. He is not ill-appearing, toxic-appearing or diaphoretic. HENT:      Head: Normocephalic and atraumatic. Eyes:      Extraocular Movements: Extraocular movements intact. Neck:      Thyroid: No thyromegaly. Vascular: No hepatojugular reflux or JVD. Trachea: No tracheal deviation. Cardiovascular:      Rate and Rhythm: Normal rate and regular rhythm. Heart sounds: Heart sounds not distant. No murmur heard. No systolic murmur is present. Pulmonary:      Effort: Pulmonary effort is normal. No tachypnea, accessory muscle usage or respiratory distress. Breath sounds: Normal breath sounds. No stridor. No decreased breath sounds, wheezing or rhonchi. Chest:      Chest wall: No mass, deformity, tenderness, crepitus or edema. There is no dullness to percussion. Abdominal:      General: There is no abdominal bruit. Palpations: Abdomen is soft. There is no fluid wave, hepatomegaly, splenomegaly or mass. Tenderness: There is no abdominal tenderness. There is no guarding or rebound. Musculoskeletal:         General: Normal range of motion. Cervical back: Normal range of motion and neck supple. Lymphadenopathy:      Cervical: No cervical adenopathy. Skin:     General: Skin is warm. Capillary Refill: Capillary refill takes less than 2 seconds. Neurological:      General: No focal deficit present. Mental Status: He is alert and oriented to person, place, and time. Cranial Nerves: No cranial nerve deficit. Motor: No weakness. Psychiatric:         Mood and Affect: Mood normal. Mood is not anxious. Behavior: Behavior normal. Behavior is not agitated. MDM  Number of Diagnoses or Management Options  Diagnosis management comments: Patient has right upper quadrant pain. I do not feel this is gallstones. Is associated with heroin use. Patient does have a history of hepatitis which did not disclose.   Will discharge home told to take Motrin follow-up with peds clinic         Procedures

## 2022-07-16 LAB
ATRIAL RATE: 88 BPM
CALCULATED P AXIS, ECG09: 77 DEGREES
CALCULATED R AXIS, ECG10: 54 DEGREES
CALCULATED T AXIS, ECG11: 62 DEGREES
DIAGNOSIS, 93000: NORMAL
P-R INTERVAL, ECG05: 146 MS
Q-T INTERVAL, ECG07: 378 MS
QRS DURATION, ECG06: 82 MS
QTC CALCULATION (BEZET), ECG08: 457 MS
VENTRICULAR RATE, ECG03: 88 BPM

## 2022-09-29 ENCOUNTER — HOSPITAL ENCOUNTER (EMERGENCY)
Age: 37
Discharge: LEFT BEFORE DISCHARGE | End: 2022-09-30
Payer: MEDICAID

## 2022-09-29 VITALS
HEIGHT: 68 IN | WEIGHT: 165 LBS | BODY MASS INDEX: 25.01 KG/M2 | OXYGEN SATURATION: 100 % | TEMPERATURE: 97.8 F | RESPIRATION RATE: 20 BRPM | DIASTOLIC BLOOD PRESSURE: 85 MMHG | HEART RATE: 95 BPM | SYSTOLIC BLOOD PRESSURE: 131 MMHG

## 2022-09-29 PROCEDURE — 75810000275 HC EMERGENCY DEPT VISIT NO LEVEL OF CARE

## 2022-09-30 ENCOUNTER — HOSPITAL ENCOUNTER (INPATIENT)
Age: 37
LOS: 4 days | Discharge: OTHER INSTITUTION | DRG: 603 | End: 2022-10-04
Attending: EMERGENCY MEDICINE | Admitting: HOSPITALIST
Payer: COMMERCIAL

## 2022-09-30 ENCOUNTER — HOSPITAL ENCOUNTER (EMERGENCY)
Age: 37
Discharge: LEFT BEFORE DISCHARGE | End: 2022-09-30
Payer: MEDICAID

## 2022-09-30 ENCOUNTER — APPOINTMENT (OUTPATIENT)
Dept: GENERAL RADIOLOGY | Age: 37
DRG: 603 | End: 2022-09-30
Attending: EMERGENCY MEDICINE
Payer: COMMERCIAL

## 2022-09-30 DIAGNOSIS — F19.10 IV DRUG ABUSE (HCC): ICD-10-CM

## 2022-09-30 DIAGNOSIS — L03.90 CELLULITIS AT INJECTION SITE: Primary | ICD-10-CM

## 2022-09-30 PROBLEM — L03.119 CELLULITIS OF EXTREMITY: Status: ACTIVE | Noted: 2022-09-30

## 2022-09-30 LAB
ALBUMIN SERPL-MCNC: 3.5 G/DL (ref 3.4–5)
ALBUMIN/GLOB SERPL: 1.1 {RATIO} (ref 0.8–1.7)
ALP SERPL-CCNC: 79 U/L (ref 45–117)
ALT SERPL-CCNC: 15 U/L (ref 16–61)
ANION GAP SERPL CALC-SCNC: 5 MMOL/L (ref 3–18)
AST SERPL-CCNC: 16 U/L (ref 10–38)
BASOPHILS # BLD: 0 K/UL (ref 0–0.1)
BASOPHILS NFR BLD: 0 % (ref 0–2)
BILIRUB SERPL-MCNC: 0.3 MG/DL (ref 0.2–1)
BUN SERPL-MCNC: 8 MG/DL (ref 7–18)
BUN/CREAT SERPL: 10 (ref 12–20)
CALCIUM SERPL-MCNC: 8.6 MG/DL (ref 8.5–10.1)
CHLORIDE SERPL-SCNC: 104 MMOL/L (ref 100–111)
CO2 SERPL-SCNC: 29 MMOL/L (ref 21–32)
CREAT SERPL-MCNC: 0.8 MG/DL (ref 0.6–1.3)
DIFFERENTIAL METHOD BLD: ABNORMAL
EOSINOPHIL # BLD: 0.3 K/UL (ref 0–0.4)
EOSINOPHIL NFR BLD: 6 % (ref 0–5)
ERYTHROCYTE [DISTWIDTH] IN BLOOD BY AUTOMATED COUNT: 12.4 % (ref 11.6–14.5)
GLOBULIN SER CALC-MCNC: 3.2 G/DL (ref 2–4)
GLUCOSE SERPL-MCNC: 101 MG/DL (ref 74–99)
HCT VFR BLD AUTO: 30.6 % (ref 36–48)
HGB BLD-MCNC: 10.3 G/DL (ref 13–16)
IMM GRANULOCYTES # BLD AUTO: 0 K/UL (ref 0–0.04)
IMM GRANULOCYTES NFR BLD AUTO: 0 % (ref 0–0.5)
LACTATE SERPL-SCNC: 1.2 MMOL/L (ref 0.4–2)
LYMPHOCYTES # BLD: 0.9 K/UL (ref 0.9–3.6)
LYMPHOCYTES NFR BLD: 18 % (ref 21–52)
MCH RBC QN AUTO: 27.8 PG (ref 24–34)
MCHC RBC AUTO-ENTMCNC: 33.7 G/DL (ref 31–37)
MCV RBC AUTO: 82.7 FL (ref 78–100)
MONOCYTES # BLD: 0.5 K/UL (ref 0.05–1.2)
MONOCYTES NFR BLD: 10 % (ref 3–10)
NEUTS SEG # BLD: 3.2 K/UL (ref 1.8–8)
NEUTS SEG NFR BLD: 65 % (ref 40–73)
NRBC # BLD: 0 K/UL (ref 0–0.01)
NRBC BLD-RTO: 0 PER 100 WBC
PLATELET # BLD AUTO: 204 K/UL (ref 135–420)
PMV BLD AUTO: 9.9 FL (ref 9.2–11.8)
POTASSIUM SERPL-SCNC: 3.7 MMOL/L (ref 3.5–5.5)
PROT SERPL-MCNC: 6.7 G/DL (ref 6.4–8.2)
RBC # BLD AUTO: 3.7 M/UL (ref 4.35–5.65)
SODIUM SERPL-SCNC: 138 MMOL/L (ref 136–145)
WBC # BLD AUTO: 4.9 K/UL (ref 4.6–13.2)

## 2022-09-30 PROCEDURE — 73060 X-RAY EXAM OF HUMERUS: CPT

## 2022-09-30 PROCEDURE — 74011250636 HC RX REV CODE- 250/636: Performed by: EMERGENCY MEDICINE

## 2022-09-30 PROCEDURE — 74011250636 HC RX REV CODE- 250/636: Performed by: HOSPITALIST

## 2022-09-30 PROCEDURE — 96374 THER/PROPH/DIAG INJ IV PUSH: CPT

## 2022-09-30 PROCEDURE — 87040 BLOOD CULTURE FOR BACTERIA: CPT

## 2022-09-30 PROCEDURE — 80053 COMPREHEN METABOLIC PANEL: CPT

## 2022-09-30 PROCEDURE — 99285 EMERGENCY DEPT VISIT HI MDM: CPT

## 2022-09-30 PROCEDURE — 74011250637 HC RX REV CODE- 250/637: Performed by: HOSPITALIST

## 2022-09-30 PROCEDURE — 73090 X-RAY EXAM OF FOREARM: CPT

## 2022-09-30 PROCEDURE — 74011250637 HC RX REV CODE- 250/637: Performed by: EMERGENCY MEDICINE

## 2022-09-30 PROCEDURE — 83605 ASSAY OF LACTIC ACID: CPT

## 2022-09-30 PROCEDURE — 96375 TX/PRO/DX INJ NEW DRUG ADDON: CPT

## 2022-09-30 PROCEDURE — 65270000029 HC RM PRIVATE

## 2022-09-30 PROCEDURE — 74011000250 HC RX REV CODE- 250: Performed by: HOSPITALIST

## 2022-09-30 PROCEDURE — 85025 COMPLETE CBC W/AUTO DIFF WBC: CPT

## 2022-09-30 PROCEDURE — 74011000258 HC RX REV CODE- 258: Performed by: HOSPITALIST

## 2022-09-30 PROCEDURE — 75810000275 HC EMERGENCY DEPT VISIT NO LEVEL OF CARE

## 2022-09-30 RX ORDER — HYDROXYZINE 25 MG/1
50 TABLET, FILM COATED ORAL
Status: COMPLETED | OUTPATIENT
Start: 2022-09-30 | End: 2022-09-30

## 2022-09-30 RX ORDER — PROMETHAZINE HYDROCHLORIDE 25 MG/1
12.5 TABLET ORAL
Status: DISCONTINUED | OUTPATIENT
Start: 2022-09-30 | End: 2022-10-04 | Stop reason: HOSPADM

## 2022-09-30 RX ORDER — LORAZEPAM 1 MG/1
2 TABLET ORAL
Status: DISCONTINUED | OUTPATIENT
Start: 2022-09-30 | End: 2022-10-04 | Stop reason: HOSPADM

## 2022-09-30 RX ORDER — VANCOMYCIN/0.9 % SOD CHLORIDE 1.5G/250ML
1500 PLASTIC BAG, INJECTION (ML) INTRAVENOUS ONCE
Status: COMPLETED | OUTPATIENT
Start: 2022-09-30 | End: 2022-09-30

## 2022-09-30 RX ORDER — SODIUM CHLORIDE 0.9 % (FLUSH) 0.9 %
5-40 SYRINGE (ML) INJECTION EVERY 8 HOURS
Status: DISCONTINUED | OUTPATIENT
Start: 2022-09-30 | End: 2022-10-04 | Stop reason: SDUPTHER

## 2022-09-30 RX ORDER — ACETAMINOPHEN 325 MG/1
650 TABLET ORAL
Status: DISCONTINUED | OUTPATIENT
Start: 2022-09-30 | End: 2022-10-04 | Stop reason: HOSPADM

## 2022-09-30 RX ORDER — ALPRAZOLAM 2 MG/1
TABLET ORAL
COMMUNITY

## 2022-09-30 RX ORDER — FOLIC ACID 1 MG/1
1 TABLET ORAL DAILY
Status: DISCONTINUED | OUTPATIENT
Start: 2022-10-01 | End: 2022-10-04 | Stop reason: HOSPADM

## 2022-09-30 RX ORDER — BUPRENORPHINE HYDROCHLORIDE 8 MG/1
8 TABLET SUBLINGUAL
Status: DISCONTINUED | OUTPATIENT
Start: 2022-09-30 | End: 2022-09-30

## 2022-09-30 RX ORDER — VANCOMYCIN HYDROCHLORIDE
1250 EVERY 12 HOURS
Status: DISCONTINUED | OUTPATIENT
Start: 2022-10-01 | End: 2022-10-04 | Stop reason: ALTCHOICE

## 2022-09-30 RX ORDER — METHADONE HYDROCHLORIDE 10 MG/1
40 TABLET ORAL DAILY
Status: DISCONTINUED | OUTPATIENT
Start: 2022-10-01 | End: 2022-10-04 | Stop reason: HOSPADM

## 2022-09-30 RX ORDER — LORAZEPAM 1 MG/1
0.5 TABLET ORAL EVERY 6 HOURS
Status: DISCONTINUED | OUTPATIENT
Start: 2022-09-30 | End: 2022-09-30

## 2022-09-30 RX ORDER — HYDROMORPHONE HYDROCHLORIDE 1 MG/ML
1 INJECTION, SOLUTION INTRAMUSCULAR; INTRAVENOUS; SUBCUTANEOUS
Status: DISCONTINUED | OUTPATIENT
Start: 2022-09-30 | End: 2022-10-04 | Stop reason: HOSPADM

## 2022-09-30 RX ORDER — HYDROXYZINE 25 MG/1
25 TABLET, FILM COATED ORAL
Status: DISCONTINUED | OUTPATIENT
Start: 2022-09-30 | End: 2022-10-04 | Stop reason: HOSPADM

## 2022-09-30 RX ORDER — FACIAL-BODY WIPES
10 EACH TOPICAL DAILY PRN
Status: DISCONTINUED | OUTPATIENT
Start: 2022-09-30 | End: 2022-10-04 | Stop reason: HOSPADM

## 2022-09-30 RX ORDER — LORAZEPAM 1 MG/1
1 TABLET ORAL
Status: DISCONTINUED | OUTPATIENT
Start: 2022-09-30 | End: 2022-10-04 | Stop reason: HOSPADM

## 2022-09-30 RX ORDER — ASPIRIN 325 MG/1
100 TABLET, FILM COATED ORAL DAILY
Status: DISCONTINUED | OUTPATIENT
Start: 2022-10-01 | End: 2022-10-04 | Stop reason: HOSPADM

## 2022-09-30 RX ORDER — ALPRAZOLAM 0.5 MG/1
2 TABLET ORAL
Status: DISCONTINUED | OUTPATIENT
Start: 2022-09-30 | End: 2022-10-04 | Stop reason: HOSPADM

## 2022-09-30 RX ORDER — GABAPENTIN 300 MG/1
300 CAPSULE ORAL 3 TIMES DAILY
COMMUNITY

## 2022-09-30 RX ORDER — DEXTROAMPHETAMINE SACCHARATE, AMPHETAMINE ASPARTATE MONOHYDRATE, DEXTROAMPHETAMINE SULFATE AND AMPHETAMINE SULFATE 5; 5; 5; 5 MG/1; MG/1; MG/1; MG/1
20 CAPSULE, EXTENDED RELEASE ORAL 3 TIMES DAILY
Status: DISCONTINUED | OUTPATIENT
Start: 2022-09-30 | End: 2022-10-04 | Stop reason: HOSPADM

## 2022-09-30 RX ORDER — GABAPENTIN 300 MG/1
300 CAPSULE ORAL 3 TIMES DAILY
Status: DISCONTINUED | OUTPATIENT
Start: 2022-09-30 | End: 2022-10-04 | Stop reason: HOSPADM

## 2022-09-30 RX ORDER — ENOXAPARIN SODIUM 100 MG/ML
40 INJECTION SUBCUTANEOUS DAILY
Status: DISCONTINUED | OUTPATIENT
Start: 2022-10-01 | End: 2022-10-04 | Stop reason: HOSPADM

## 2022-09-30 RX ORDER — IBUPROFEN 200 MG
1 TABLET ORAL DAILY
Status: DISCONTINUED | OUTPATIENT
Start: 2022-10-01 | End: 2022-10-04 | Stop reason: HOSPADM

## 2022-09-30 RX ORDER — LOPERAMIDE HYDROCHLORIDE 2 MG/1
2 CAPSULE ORAL AS NEEDED
Status: DISCONTINUED | OUTPATIENT
Start: 2022-09-30 | End: 2022-10-04 | Stop reason: HOSPADM

## 2022-09-30 RX ORDER — CLONIDINE HYDROCHLORIDE 0.1 MG/1
0.1 TABLET ORAL
Status: DISCONTINUED | OUTPATIENT
Start: 2022-09-30 | End: 2022-10-04 | Stop reason: HOSPADM

## 2022-09-30 RX ORDER — METHADONE HYDROCHLORIDE 40 MG/1
40 TABLET ORAL DAILY
COMMUNITY

## 2022-09-30 RX ORDER — ACETAMINOPHEN 650 MG/1
650 SUPPOSITORY RECTAL
Status: DISCONTINUED | OUTPATIENT
Start: 2022-09-30 | End: 2022-10-04 | Stop reason: HOSPADM

## 2022-09-30 RX ORDER — LORAZEPAM 0.5 MG/1
0.5 TABLET ORAL EVERY 6 HOURS
Status: DISCONTINUED | OUTPATIENT
Start: 2022-10-01 | End: 2022-10-04

## 2022-09-30 RX ORDER — SODIUM CHLORIDE 0.9 % (FLUSH) 0.9 %
5-40 SYRINGE (ML) INJECTION EVERY 8 HOURS
Status: DISCONTINUED | OUTPATIENT
Start: 2022-09-30 | End: 2022-10-04 | Stop reason: HOSPADM

## 2022-09-30 RX ORDER — KETOROLAC TROMETHAMINE 15 MG/ML
15 INJECTION, SOLUTION INTRAMUSCULAR; INTRAVENOUS
Status: DISCONTINUED | OUTPATIENT
Start: 2022-09-30 | End: 2022-10-04 | Stop reason: HOSPADM

## 2022-09-30 RX ORDER — ONDANSETRON 2 MG/ML
4 INJECTION INTRAMUSCULAR; INTRAVENOUS
Status: DISCONTINUED | OUTPATIENT
Start: 2022-09-30 | End: 2022-10-04 | Stop reason: HOSPADM

## 2022-09-30 RX ORDER — SODIUM CHLORIDE 0.9 % (FLUSH) 0.9 %
5-40 SYRINGE (ML) INJECTION AS NEEDED
Status: DISCONTINUED | OUTPATIENT
Start: 2022-09-30 | End: 2022-10-04 | Stop reason: SDUPTHER

## 2022-09-30 RX ORDER — LORAZEPAM 1 MG/1
1 TABLET ORAL
Status: COMPLETED | OUTPATIENT
Start: 2022-09-30 | End: 2022-09-30

## 2022-09-30 RX ORDER — SODIUM CHLORIDE 0.9 % (FLUSH) 0.9 %
5-40 SYRINGE (ML) INJECTION AS NEEDED
Status: DISCONTINUED | OUTPATIENT
Start: 2022-09-30 | End: 2022-10-04 | Stop reason: HOSPADM

## 2022-09-30 RX ORDER — KETOROLAC TROMETHAMINE 15 MG/ML
15 INJECTION, SOLUTION INTRAMUSCULAR; INTRAVENOUS
Status: COMPLETED | OUTPATIENT
Start: 2022-09-30 | End: 2022-09-30

## 2022-09-30 RX ADMIN — GABAPENTIN 300 MG: 300 CAPSULE ORAL at 21:52

## 2022-09-30 RX ADMIN — PIPERACILLIN AND TAZOBACTAM 3.38 G: 3; .375 INJECTION, POWDER, FOR SOLUTION INTRAVENOUS at 20:27

## 2022-09-30 RX ADMIN — SODIUM CHLORIDE, PRESERVATIVE FREE 10 ML: 5 INJECTION INTRAVENOUS at 21:53

## 2022-09-30 RX ADMIN — HYDROXYZINE HYDROCHLORIDE 50 MG: 25 TABLET, FILM COATED ORAL at 18:35

## 2022-09-30 RX ADMIN — LORAZEPAM 1 MG: 1 TABLET ORAL at 18:11

## 2022-09-30 RX ADMIN — VANCOMYCIN HYDROCHLORIDE 1500 MG: 10 INJECTION, POWDER, LYOPHILIZED, FOR SOLUTION INTRAVENOUS at 18:20

## 2022-09-30 RX ADMIN — KETOROLAC TROMETHAMINE 15 MG: 15 INJECTION, SOLUTION INTRAMUSCULAR; INTRAVENOUS at 18:11

## 2022-09-30 NOTE — PROGRESS NOTES
[address] [facility] Pharmacokinetic Monitoring Service - Vancomycin     Kimberly Lozada is a 29 y.o. male starting on vancomycin therapy for skin and soft tissue infection. Pharmacy consulted by Dr. Christopher Hadley for monitoring and adjustment. Target Concentration: Goal AUC/-600 mg*hr/L    Additional Antimicrobials: Zosyn     Pertinent Laboratory Values: Wt Readings from Last 1 Encounters:   09/30/22 74.8 kg (165 lb)     Temp Readings from Last 1 Encounters:   09/30/22 98 °F (36.7 °C)     No components found for: PROCAL  Estimated Creatinine Clearance: 124.7 mL/min (based on SCr of 0.8 mg/dL).   Recent Labs     09/30/22  1800   WBC 4.9     Plan:  Dosing recommendations based on Bayesian software  Vancomycin 1500 mg IVPB loading dose x 1 then Vancomycin 1250 mg IVPB q12h  Anticipated AUC of 502 mg/l.h and trough concentration of 14.6 mg/l at steady state  Renal labs as indicated   Pharmacy will continue to monitor patient and adjust therapy as indicated    Subha Norris [facility]   9/30/2022 7:22 PM
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
## 2022-09-30 NOTE — H&P
History & Physical    Patient: Shun Davis MRN: [id number]  CSN: [id number]    YOB: 1985  Age: 29 y.o. Sex: male      DOA: 9/30/2022  Primary Care Provider:  Solange Nieto MD      Assessment/Plan     Hospital Problems  Date Reviewed: 4/26/2022            Codes Class Noted POA    Cellulitis of extremity ICD-10-CM: L03.119  ICD-9-CM: Batsheva Isbell  9/30/2022 Unknown        Smoker ICD-10-CM: F17.200  ICD-9-CM: 305.1  4/27/2022 Yes        Alcohol abuse ICD-10-CM: F10.10  ICD-9-CM: 305.00  7/21/2021 Yes        Hepatitis-C ICD-10-CM: B19.20  ICD-9-CM: 070.70  7/21/2021 Yes        Polysubstance abuse ([facility] ICD-10-CM: F19.10  ICD-9-CM: 305.90  6/15/2013 Yes             Admit to medical     80-year-old male who is a smoker with polysubstance abuse is admitted for right arm cellulitis s/p heroin injection. Right arm cellulitis and rt foot   IV antibiotics with Zosyn and vancomycin  Will do Duplex ultrasound to rule out DVT  Pain control with dilaudid and Toradol      Smoker  -Smoking cessation recommended  -Nicotine patch ordered     Polysubstance abuse  Subaxone prn continue methadone   -continue educate   Need rehab if he agrees   Watch for withdrawal   Ativan ,clonidine atarax seizure precaution     Hcv need to be treated if he agrees     Alcohol drinking   Ciwa protocol   Ativan schedued      Smoker nicotine patch       Please note that this dictation was completed with [employer], the [employer] voice recognition software. Quite often unanticipated grammatical, syntax, homophones, and other interpretive errors are inadvertently transcribed by the computer software. Please disregard these errors. Please excuse any errors that have escaped final proofreading    Estimate  length of stay : 2-3 day    DVT : lovenox   CC: skin issue        HPI:     Shun Davis is a 29 y.o. male with polysubstance abuse-IV heroine presented to ER with due to arm and foot skin issues .   He continues injecting heroine, he received p.o. clindamycin for 1 week -treated for cellulitis. His swelling and redness not improving per po abx  He was recently admitted to our hospital in April and he left AMA. Suboxone was offered per ER, patient refused. Blood culture was obtained in ER. X-ray per results still pending ,per er reported-no subcutaneous gas . He used 1 g heroin mixed with cocaine. He injected Q2hr and last use was before coming to ER. [facility]. He stated he is on methadone and received today's dose am. He said he wants to get rid of the drug in comfortable way. Girl friend was at the beside. Visit Vitals  /75   Pulse 80   Temp 98 °F (36.7 °C)   Resp 16   Ht 5' 8\" (1.727 m)   Wt 74.8 kg (165 lb)   SpO2 100%   BMI 25.09 kg/m²      O2 Device: None (Room air)      Past Medical History:   Diagnosis Date    Drug abuse, IV (HCC)     Heroin abuse ([address]     Liver disease     Hepatitis C - treated    Psychiatric diagnosis     ADHD    Psychiatric diagnosis     drug abuse. Psychiatric disorder     anxiety    Seizures ([address]     related to substance withdrawal    Sleep apnea     no cpap - not formally diagnosed     Hx of surgery   None   Family History   Problem Relation Age of Onset    No Known Problems Mother     Heart Disease Father        Social History     Socioeconomic History    Marital status: SINGLE   Tobacco Use    Smoking status: Every Day     Packs/day: 1.00     Types: Cigarettes    Smokeless tobacco: Never   Vaping Use    Vaping Use: Former   Substance and Sexual Activity    Alcohol use: Yes     Comment: daily    Drug use: Yes     Types: Heroin, Prescription, Cocaine   Social History Narrative    ** Merged History Encounter **            Prior to Admission medications    Medication Sig Start Date End Date Taking?  Authorizing Provider   amphetamine-dextroamphetamine XR (Adderall XR) 20 mg XR capsule Take 20 mg by mouth three (3) times daily. Other, MD Celia       No Known Allergies    Review of Systems  Gen: +fever, no chills, malaise, weight loss/gain. Heent: No headache, rhinorrhea, epistaxis, ear pain, hearing loss, sinus pain, neck pain/stiffness, sore throat. Heart: No chest pain, palpitations, PUCKETT, pnd, or orthopnea. Resp: No cough, hemoptysis, wheezing and shortness of breath. GI: No nausea, vomiting, diarrhea, constipation, melena or hematochezia. : No urinary obstruction, dysuria or hematuria. Derm: arm pain rash, skin lesion or pruritis. Musc/skeletal: no bone or joint complains. Vasc: No edema, cyanosis or claudication. Endo: No heat/cold intolerance, no polyuria,polydipsia or polyphagia. Neuro: No unilateral weakness, numbness, tingling. No seizures. Heme: No easy bruising or bleeding. Physical Exam:     Physical Exam:  Visit Vitals  /75   Pulse 80   Temp 98 °F (36.7 °C)   Resp 16   Ht 5' 8\" (1.727 m)   Wt 74.8 kg (165 lb)   SpO2 100%   BMI 25.09 kg/m²      O2 Device: None (Room air)    Temp (24hrs), Av.9 °F (36.6 °C), Min:97.8 °F (36.6 °C), Max:98 °F (36.7 °C)    No intake/output data recorded. No intake/output data recorded. General:  Awake, cooperative, no distress. Head:  Normocephalic, without obvious abnormality, atraumatic. Eyes:  Conjunctivae/corneas clear, sclera anicteric, PERRL, EOMs intact. Nose: Nares normal. No drainage or sinus tenderness. Throat: Lips, mucosa, and tongue normal. .   Neck: Supple, symmetrical, trachea midline, no adenopathy. Lungs:   Clear to auscultation bilaterally. Heart:  Regular rate and rhythm, S1, S2 normal, no murmur, click, rub or gallop. Abdomen: Soft, non-tender. Bowel sounds normal. No masses,  No organomegaly. Extremities: Extremities normal, multiple trace make noted on arms, feet , erythema noted and tenderness, swelling tatoo noted on rt arm    Pulses: 2+ and symmetric all extremities.    Skin: Skin color-pink, texture, turgor normal. See above . Capillary refill normal    Neurologic: CNII-XII intact. No focal motor or sensory deficit.        Labs Reviewed:    BMP:   Lab Results   Component Value Date/Time     09/30/2022 06:00 PM    K 3.7 09/30/2022 06:00 PM     09/30/2022 06:00 PM    CO2 29 09/30/2022 06:00 PM    AGAP 5 09/30/2022 06:00 PM     (H) 09/30/2022 06:00 PM    BUN 8 09/30/2022 06:00 PM    CREA 0.80 09/30/2022 06:00 PM    GFRAA >60 09/30/2022 06:00 PM    GFRNA >60 09/30/2022 06:00 PM     CMP:   Lab Results   Component Value Date/Time     09/30/2022 06:00 PM    K 3.7 09/30/2022 06:00 PM     09/30/2022 06:00 PM    CO2 29 09/30/2022 06:00 PM    AGAP 5 09/30/2022 06:00 PM     (H) 09/30/2022 06:00 PM    BUN 8 09/30/2022 06:00 PM    CREA 0.80 09/30/2022 06:00 PM    GFRAA >60 09/30/2022 06:00 PM    GFRNA >60 09/30/2022 06:00 PM    CA 8.6 09/30/2022 06:00 PM    ALB 3.5 09/30/2022 06:00 PM    TP 6.7 09/30/2022 06:00 PM    GLOB 3.2 09/30/2022 06:00 PM    AGRAT 1.1 09/30/2022 06:00 PM    ALT 15 (L) 09/30/2022 06:00 PM     CBC:   Lab Results   Component Value Date/Time    WBC 4.9 09/30/2022 06:00 PM    HGB 10.3 (L) 09/30/2022 06:00 PM    HCT 30.6 (L) 09/30/2022 06:00 PM     09/30/2022 06:00 PM     All Cardiac Markers in the last 24 hours: No results found for: CPK, CK, CKMMB, CKMB, RCK3, CKMBT, CKNDX, CKND1, BUD, TROPT, TROIQ, NICOLE, TROPT, TNIPOC, BNP, BNPP  Recent Glucose Results:   Lab Results   Component Value Date/Time     (H) 09/30/2022 06:00 PM     ABG: No results found for: PH, PHI, PCO2, PCO2I, PO2, PO2I, HCO3, HCO3I, FIO2, FIO2I  COAGS: No results found for: APTT, PTP, INR, INREXT, INREXT  Liver Panel:   Lab Results   Component Value Date/Time    ALB 3.5 09/30/2022 06:00 PM    TP 6.7 09/30/2022 06:00 PM    GLOB 3.2 09/30/2022 06:00 PM    AGRAT 1.1 09/30/2022 06:00 PM    ALT 15 (L) 09/30/2022 06:00 PM    AP 79 09/30/2022 06:00 PM     Pancreatic Markers: No results found for: AMYLPOCT, AML, LIPPOCT, LPSE    No results found.   Procedures/imaging: see electronic medical records for all procedures/Xrays and details which were not copied into this note but were reviewed prior to creation of Manuel Montiel MD, Internal Medicine     CC: Diego Bartlett MD
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
## 2022-09-30 NOTE — Clinical Note
Status[de-identified] INPATIENT [101]   Type of Bed: Medical [8]   Cardiac Monitoring Required?: No   Inpatient Hospitalization Certified Necessary for the Following Reasons: 3.  Patient receiving treatment that can only be provided in an inpatient setting (further clarification in H&P documentation)   Admitting Diagnosis: Cellulitis at injection site [[id number]]   Admitting Physician: Rashida Bucio [[id number]]   Attending Physician: Rashida Bucio [[id number]]   Estimated Length of Stay: 3-4 Midnights   Discharge Plan[de-identified] Home with Office Follow-up

## 2022-09-30 NOTE — ED NOTES
Pt reports he is concerned he will be admitted and needs to get his things from his hotel room. Pt reports he will Leave w/o being seen and return.

## 2022-10-01 ENCOUNTER — APPOINTMENT (OUTPATIENT)
Dept: VASCULAR SURGERY | Age: 37
DRG: 603 | End: 2022-10-01
Attending: HOSPITALIST
Payer: COMMERCIAL

## 2022-10-01 LAB
ANION GAP SERPL CALC-SCNC: 5 MMOL/L (ref 3–18)
BASOPHILS # BLD: 0 K/UL (ref 0–0.1)
BASOPHILS NFR BLD: 1 % (ref 0–2)
BUN SERPL-MCNC: 9 MG/DL (ref 7–18)
BUN/CREAT SERPL: 11 (ref 12–20)
CALCIUM SERPL-MCNC: 8.7 MG/DL (ref 8.5–10.1)
CHLORIDE SERPL-SCNC: 108 MMOL/L (ref 100–111)
CO2 SERPL-SCNC: 27 MMOL/L (ref 21–32)
CREAT SERPL-MCNC: 0.84 MG/DL (ref 0.6–1.3)
DIFFERENTIAL METHOD BLD: ABNORMAL
EOSINOPHIL # BLD: 0.3 K/UL (ref 0–0.4)
EOSINOPHIL NFR BLD: 7 % (ref 0–5)
ERYTHROCYTE [DISTWIDTH] IN BLOOD BY AUTOMATED COUNT: 12.6 % (ref 11.6–14.5)
GLUCOSE SERPL-MCNC: 91 MG/DL (ref 74–99)
HCT VFR BLD AUTO: 30.8 % (ref 36–48)
HGB BLD-MCNC: 10.2 G/DL (ref 13–16)
IMM GRANULOCYTES # BLD AUTO: 0 K/UL (ref 0–0.04)
IMM GRANULOCYTES NFR BLD AUTO: 1 % (ref 0–0.5)
LYMPHOCYTES # BLD: 0.8 K/UL (ref 0.9–3.6)
LYMPHOCYTES NFR BLD: 21 % (ref 21–52)
MAGNESIUM SERPL-MCNC: 2.5 MG/DL (ref 1.6–2.6)
MCH RBC QN AUTO: 28 PG (ref 24–34)
MCHC RBC AUTO-ENTMCNC: 33.1 G/DL (ref 31–37)
MCV RBC AUTO: 84.6 FL (ref 78–100)
MONOCYTES # BLD: 0.5 K/UL (ref 0.05–1.2)
MONOCYTES NFR BLD: 15 % (ref 3–10)
NEUTS SEG # BLD: 2.1 K/UL (ref 1.8–8)
NEUTS SEG NFR BLD: 57 % (ref 40–73)
NRBC # BLD: 0 K/UL (ref 0–0.01)
NRBC BLD-RTO: 0 PER 100 WBC
PLATELET # BLD AUTO: 201 K/UL (ref 135–420)
PMV BLD AUTO: 10 FL (ref 9.2–11.8)
POTASSIUM SERPL-SCNC: 3.7 MMOL/L (ref 3.5–5.5)
RBC # BLD AUTO: 3.64 M/UL (ref 4.35–5.65)
SODIUM SERPL-SCNC: 140 MMOL/L (ref 136–145)
WBC # BLD AUTO: 3.7 K/UL (ref 4.6–13.2)

## 2022-10-01 PROCEDURE — 74011250636 HC RX REV CODE- 250/636: Performed by: HOSPITALIST

## 2022-10-01 PROCEDURE — 65270000029 HC RM PRIVATE

## 2022-10-01 PROCEDURE — 85025 COMPLETE CBC W/AUTO DIFF WBC: CPT

## 2022-10-01 PROCEDURE — 74011250637 HC RX REV CODE- 250/637: Performed by: HOSPITALIST

## 2022-10-01 PROCEDURE — 36415 COLL VENOUS BLD VENIPUNCTURE: CPT

## 2022-10-01 PROCEDURE — 74011000250 HC RX REV CODE- 250: Performed by: HOSPITALIST

## 2022-10-01 PROCEDURE — 74011000258 HC RX REV CODE- 258: Performed by: HOSPITALIST

## 2022-10-01 PROCEDURE — 80048 BASIC METABOLIC PNL TOTAL CA: CPT

## 2022-10-01 PROCEDURE — 83735 ASSAY OF MAGNESIUM: CPT

## 2022-10-01 PROCEDURE — 93970 EXTREMITY STUDY: CPT

## 2022-10-01 RX ADMIN — Medication 100 MG: at 08:19

## 2022-10-01 RX ADMIN — VANCOMYCIN HYDROCHLORIDE 1250 MG: 10 INJECTION, POWDER, LYOPHILIZED, FOR SOLUTION INTRAVENOUS at 19:42

## 2022-10-01 RX ADMIN — LORAZEPAM 0.5 MG: 0.5 TABLET ORAL at 12:13

## 2022-10-01 RX ADMIN — SODIUM CHLORIDE, PRESERVATIVE FREE 10 ML: 5 INJECTION INTRAVENOUS at 14:00

## 2022-10-01 RX ADMIN — SODIUM CHLORIDE, PRESERVATIVE FREE 10 ML: 5 INJECTION INTRAVENOUS at 05:45

## 2022-10-01 RX ADMIN — KETOROLAC TROMETHAMINE 15 MG: 15 INJECTION, SOLUTION INTRAMUSCULAR; INTRAVENOUS at 19:42

## 2022-10-01 RX ADMIN — GABAPENTIN 300 MG: 300 CAPSULE ORAL at 16:35

## 2022-10-01 RX ADMIN — PIPERACILLIN AND TAZOBACTAM 3.38 G: 3; .375 INJECTION, POWDER, FOR SOLUTION INTRAVENOUS at 12:13

## 2022-10-01 RX ADMIN — SODIUM CHLORIDE, PRESERVATIVE FREE 10 ML: 5 INJECTION INTRAVENOUS at 21:47

## 2022-10-01 RX ADMIN — ENOXAPARIN SODIUM 40 MG: 100 INJECTION SUBCUTANEOUS at 08:21

## 2022-10-01 RX ADMIN — PIPERACILLIN AND TAZOBACTAM 3.38 G: 3; .375 INJECTION, POWDER, FOR SOLUTION INTRAVENOUS at 08:17

## 2022-10-01 RX ADMIN — FOLIC ACID 1 MG: 1 TABLET ORAL at 08:19

## 2022-10-01 RX ADMIN — SODIUM CHLORIDE, PRESERVATIVE FREE 20 ML: 5 INJECTION INTRAVENOUS at 21:45

## 2022-10-01 RX ADMIN — GABAPENTIN 300 MG: 300 CAPSULE ORAL at 08:18

## 2022-10-01 RX ADMIN — METHADONE HYDROCHLORIDE 40 MG: 10 TABLET ORAL at 08:18

## 2022-10-01 RX ADMIN — VANCOMYCIN HYDROCHLORIDE 1250 MG: 10 INJECTION, POWDER, LYOPHILIZED, FOR SOLUTION INTRAVENOUS at 05:44

## 2022-10-01 RX ADMIN — HYDROMORPHONE HYDROCHLORIDE 1 MG: 1 INJECTION, SOLUTION INTRAMUSCULAR; INTRAVENOUS; SUBCUTANEOUS at 22:16

## 2022-10-01 RX ADMIN — GABAPENTIN 300 MG: 300 CAPSULE ORAL at 21:45

## 2022-10-01 RX ADMIN — LORAZEPAM 0.5 MG: 0.5 TABLET ORAL at 05:45

## 2022-10-01 RX ADMIN — LORAZEPAM 0.5 MG: 0.5 TABLET ORAL at 00:24

## 2022-10-01 RX ADMIN — PIPERACILLIN AND TAZOBACTAM 3.38 G: 3; .375 INJECTION, POWDER, FOR SOLUTION INTRAVENOUS at 18:07

## 2022-10-01 RX ADMIN — PIPERACILLIN AND TAZOBACTAM 3.38 G: 3; .375 INJECTION, POWDER, FOR SOLUTION INTRAVENOUS at 00:24

## 2022-10-01 NOTE — PROGRESS NOTES
[address] [facility] Pharmacokinetic Monitoring Service - Vancomycin    Consulting Provider: Jerry Ramachandran   Indication: SSTI x 7 days  Target Concentration: Goal AUC/-600 mg*hr/L  Day of Therapy: 2  Additional Antimicrobials: zosyn    Pertinent Laboratory Values: Wt Readings from Last 1 Encounters:   09/30/22 74.8 kg (165 lb)     Temp Readings from Last 1 Encounters:   10/01/22 98.2 °F (36.8 °C)     No components found for: PROCAL  Estimated Creatinine Clearance: 118.8 mL/min (based on SCr of 0.84 mg/dL).   Recent Labs     10/01/22  0305 09/30/22  1800   WBC 3.7* 4.9     Plan:  Current dosing regimen is therapeutic  Continue Vancomycin 1250mg IV Q12H for  mg/L.hr.   Random Vancomycin level 10/02/22 with AM labs  Pharmacy will continue to monitor patient and adjust therapy as indicated      MELE Herrera  10/1/2022 10:23 AM

## 2022-10-01 NOTE — PROGRESS NOTES
Hospitalist Progress Note-critical care note     Patient: Israel Newton MRN: [id number]  CSN: [id number]    YOB: 1985  Age: 29 y.o. Sex: male    DOA: 9/30/2022 LOS:  LOS: 1 day            Chief complaint: cellulitis , polysubstance use   Assessment/Plan         Hospital Problems  Date Reviewed: 4/26/2022            Codes Class Noted POA    Cellulitis of extremity ICD-10-CM: L03.119  ICD-9-CM: Brigido Board  9/30/2022 Unknown        Smoker ICD-10-CM: F17.200  ICD-9-CM: 305.1  4/27/2022 Yes        Alcohol abuse ICD-10-CM: F10.10  ICD-9-CM: 305.00  7/21/2021 Yes        Hepatitis-C ICD-10-CM: B19.20  ICD-9-CM: 070.70  7/21/2021 Yes        Polysubstance abuse ([facility] ICD-10-CM: F19.10  ICD-9-CM: 305.90  6/15/2013 Yes            73-year-old male who is a smoker with polysubstance abuse is admitted for right arm cellulitis s/p heroin injection. arm cellulitis and rt foot   Improving and continue IV antibiotics with Zosyn and vancomycin  Will do Duplex ultrasound to rule out DVT  Pain control with dilaudid and Toradol      Smoker  Smoking cessation recommended  Nicotine patch ordered     Polysubstance abuse  continue methadone   continue educate   Need rehab if he agrees -will have cm on board   Watch for withdrawal   Ativan ,clonidine atarax seizure precaution , imodium      Hcv need to be treated if he agrees      Alcohol drinking   Ciwa protocol   Ativan schedued        Subjective I feel fine   Girl friend was in the bed   Continue iv abx , watch for withdrawal    Cm consult for rehab   Topic steroid for dermatitis on arm   Disposition :tbd,   Review of systems:    General: No fevers or chills. Cardiovascular: No chest pain or pressure. No palpitations. Pulmonary: No shortness of breath. Gastrointestinal: No nausea, vomiting.      Vital signs/Intake and Output:  Visit Vitals  BP (!) 116/53   Pulse 71   Temp 98.2 °F (36.8 °C)   Resp 16   Ht 5' 8\" (1.727 m)   Wt 74.8 kg (165 lb)   SpO2 98%   BMI 25.09 kg/m²     Current Shift:  No intake/output data recorded. Last three shifts:  No intake/output data recorded. Physical Exam:  General: WD, WN. Alert, cooperative, no acute distress    HEENT: NC, Atraumatic. PERRLA, anicteric sclerae. Lungs: CTA Bilaterally. No Wheezing/Rhonchi/Rales. Heart:  Regular  rhythm,  No murmur, No Rubs, No Gallops  Abdomen: Soft, Non distended, Non tender. +Bowel sounds,   Extremities: No c/c erythema on arms and hand /foot better, rash on left arm   Psych:   Not anxious or agitated. Neurologic:  No acute neurological deficit. Labs: Results:       Chemistry Recent Labs     10/01/22  0305 09/30/22  1800   GLU 91 101*    138   K 3.7 3.7    104   CO2 27 29   BUN 9 8   CREA 0.84 0.80   CA 8.7 8.6   AGAP 5 5   BUCR 11* 10*   AP  --  79   TP  --  6.7   ALB  --  3.5   GLOB  --  3.2   AGRAT  --  1.1      CBC w/Diff Recent Labs     10/01/22  0305 09/30/22  1800   WBC 3.7* 4.9   RBC 3.64* 3.70*   HGB 10.2* 10.3*   HCT 30.8* 30.6*    204   GRANS 57 65   LYMPH 21 18*   EOS 7* 6*      Cardiac Enzymes No results for input(s): CPK, CKND1, BUD in the last 72 hours. No lab exists for component: CKRMB, TROIP   Coagulation No results for input(s): PTP, INR, APTT, INREXT in the last 72 hours. Lipid Panel No results found for: CHOL, CHOLPOCT, CHOLX, CHLST, CHOLV, 851168, HDL, HDLP, LDL, LDLC, DLDLP, 453100, VLDLC, VLDL, TGLX, TRIGL, TRIGP, TGLPOCT, CHHD, CHHDX   BNP No results for input(s): BNPP in the last 72 hours.    Liver Enzymes Recent Labs     09/30/22  1800   TP 6.7   ALB 3.5   AP 79      Thyroid Studies Lab Results   Component Value Date/Time    TSH 1.49 03/11/2016 02:55 AM        Procedures/imaging: see electronic medical records for all procedures/Xrays and details which were not copied into this note but were reviewed prior to creation of Plan    XR HUMERUS RT    Result Date: 9/30/2022  Right humerus History: Soft tissue swelling Comparison:  No prior study Findings: Two views of the humerus performed. No fracture or dislocation is seen. Alignment is unremarkable. Soft tissue swelling may be present at the antecubital fossa region, not definitive. No radiopaque foreign body is seen. No fracture. Possible antecubital fossa region soft tissue swelling, perhaps edema or contusion. XR FOREARM LT AP/LAT    Result Date: 9/30/2022  Left forearm History: Soft tissue swelling Comparison: None Two views of the forearm demonstrate no fracture or dislocation. Soft tissue swelling is seen along the forearm, particularly the ventral surface, extending proximally to the antecubital fossa region. No soft tissue gas is present. Alignment is unremarkable. No radiopaque foreign body is seen. No fracture. Nonspecific soft tissue swelling greatest along the ventral surface of the forearm and antecubital fossa, perhaps cellulitis or edema. XR FOREARM RT AP/LAT    Result Date: 9/30/2022  Right forearm History: Soft tissue swelling Comparison: None Two views of the forearm demonstrate no fracture or dislocation. Mild soft tissue swelling is suggested along the ventral surface of the forearm and antecubital fossa. Alignment is unremarkable. No radiopaque foreign body is seen. No fracture. Nonspecific soft tissue swelling along the ventral surface of the forearm and along the antecubital fossa, perhaps edema or cellulitis. No radiopaque foreign body.        Brandi Smith MD

## 2022-10-01 NOTE — PROGRESS NOTES
Reason for Admission: Chart reviewed; per H&P, patient is a \"30 y.o. male with polysubstance abuse-IV heroine presented to ER with due to arm and foot skin issues . He continues injecting heroine, he received p.o. clindamycin for 1 week -treated for cellulitis. His swelling and redness not improving per po abx  He was recently admitted to our hospital in April and he left AMA. Suboxone was offered per ER, patient refused. Blood culture was obtained in ER. X-ray per results still pending ,per er reported-no subcutaneous gas . He used 1 g heroin mixed with cocaine. He injected Q2hr and last use was before coming to ER. He is supposed to go to [place] rehab-cancelled due the hurricaine. \"                     RUR Score:     low, 12%                Plan for utilizing home health:          PCP: First and Last name:  Krissy Naidu MD     Name of Practice:    Are you a current patient: Yes/No:    Approximate date of last visit:    Can you participate in a virtual visit with your PCP:                     Current Advanced Directive/Advance Care Plan: Full Code      Healthcare Decision Maker:   Click here to complete [address] including selection of the Healthcare Decision Maker Relationship (ie \"Primary\")             Primary Decision Maker: Mar Hurley - Mother - [phone]                  Transition of Care Plan:    1100- care manager attempted to interview patient; patient was very soundly asleep and did not arouse to voice; will check back later. Per primary nurse, this somnolence seems to coincide with visit by girlfriend. 1400- second attempt to see patient who was on his way in to bathroom; was in bathroom x 10 minutes; spoke with patient who has constricted pupils but is calm and alert during interview.   States he cannot remember the name of the facility in [place] he is going to, that it is in his phone which is out of power - obtained cord for patient to charge up phone so he can check on his acceptance status. States he lives alone, no DME. Will follow for discharge needs.     Care Management Interventions  Mode of Transport at Discharge: Self  Transition of Care Consult (CM Consult): Discharge Planning  Support Systems: Parent(s)  Confirm Follow Up Transport: Family  The Plan for Transition of Care is Related to the Following Treatment Goals : cellulitis left arm  The Patient and/or Patient Representative was Provided with a Choice of Provider and Agrees with the Discharge Plan?: Yes  Name of the Patient Representative Who was Provided with a Choice of Provider and Agrees with the Discharge Plan: Merrill Ventura, patient  Discharge Location  Patient Expects to be Discharged to[de-identified]  (substance abuse treatment program)

## 2022-10-01 NOTE — PROGRESS NOTES
Problem: Falls - Risk of  Goal: *Absence of Falls  Description: Document [place] Fall Risk and appropriate interventions in the flowsheet.   Outcome: Progressing Towards Goal  Note: Fall Risk Interventions:     Medication Interventions: Evaluate medications/consider consulting pharmacy, Patient to call before getting OOB      Problem: Cellulitis Care Plan (Adult)  Goal: *Control of acute pain  Outcome: Progressing Towards Goal

## 2022-10-01 NOTE — ED PROVIDER NOTES
EMERGENCY DEPARTMENT HISTORY AND PHYSICAL EXAM      Date: 9/30/2022  Patient Name: Geneva Weiss    History of Presenting Illness     Chief Complaint   Patient presents with    Skin Problem       History Provided By: Patient    HPI: Geneva Weiss, 29 y.o. male with history of IVDA, polysubstance abuse with ongoing heroin and cocaine use, last use about 1 hour prior to arrival presents to the ED with cc of cellulitis bilateral upper extremities. Symptoms have been present for the past 1 to 2 weeks, worsening over the past 3 to 4 days. He has already been evaluated for this on 9/15 and started on antibiotics at a [place] facility. He denies fevers, chest pain, shortness of breath. The cellulitis started at areas where he was injecting. He does note some drainage with concern for a few abscesses. He reports completing a course of clindamycin 9/23 without improvement in his symptoms. Denies prior history of bacteremia or endocarditis. There are no other complaints, changes, or physical findings at this time. PCP: Leilani Mitchell MD    No current facility-administered medications on file prior to encounter. Current Outpatient Medications on File Prior to Encounter   Medication Sig Dispense Refill    gabapentin (NEURONTIN) 300 mg capsule Take 300 mg by mouth three (3) times daily. ALPRAZolam (Xanax) 2 mg tablet Take  by mouth three (3) times daily as needed for Anxiety. methadone (METHADOSE) 40 mg soluble tablet Take 40 mg by mouth daily. amphetamine-dextroamphetamine XR (ADDERALL XR) 20 mg XR capsule Take 20 mg by mouth three (3) times daily. Past History     Past Medical History:  Past Medical History:   Diagnosis Date    Drug abuse, IV ([facility] [address]     Heroin abuse ([facility] [address]     Liver disease     Hepatitis C - treated    Psychiatric diagnosis     ADHD    Psychiatric diagnosis     drug abuse.     Psychiatric disorder     anxiety    Seizures ([facility] [address]     related to substance withdrawal    Sleep apnea     no cpap - not formally diagnosed       Past Surgical History:  No past surgical history on file. Family History:  Family History   Problem Relation Age of Onset    No Known Problems Mother     Heart Disease Father        Social History:  Social History     Tobacco Use    Smoking status: Every Day     Packs/day: 1.00     Types: Cigarettes    Smokeless tobacco: Never   Vaping Use    Vaping Use: Former   Substance Use Topics    Alcohol use: Yes     Comment: daily    Drug use: Yes     Types: Heroin, Prescription, Cocaine       Allergies:  No Known Allergies      Review of Systems   Review of Systems   Constitutional:  Negative for chills and fever. Respiratory:  Negative for shortness of breath. Cardiovascular:  Negative for chest pain. Musculoskeletal:  Positive for myalgias. Skin:  Positive for color change and wound. Neurological:  Negative for weakness and numbness. All other systems reviewed and are negative. Physical Exam   Physical Exam  Vitals and nursing note reviewed. Constitutional:       General: He is not in acute distress. Appearance: Normal appearance. He is not ill-appearing or toxic-appearing. HENT:      Head: Normocephalic and atraumatic. Nose: Nose normal.      Mouth/Throat:      Mouth: Mucous membranes are moist.   Eyes:      Extraocular Movements: Extraocular movements intact. Pupils: Pupils are equal, round, and reactive to light. Cardiovascular:      Rate and Rhythm: Normal rate and regular rhythm. Heart sounds: No murmur heard. Pulmonary:      Effort: Pulmonary effort is normal. No respiratory distress. Breath sounds: Normal breath sounds. No wheezing. Abdominal:      General: There is no distension. Palpations: Abdomen is soft. Tenderness: There is no abdominal tenderness. There is no guarding or rebound. Musculoskeletal:         General: Swelling and tenderness present. Normal range of motion. Cervical back: Normal range of motion and neck supple. Right lower leg: No edema. Left lower leg: No edema. Skin:     General: Skin is warm and dry. Coloration: Skin is not pale. Findings: Erythema present. Comments: Erythema and induration of the bilateral upper extremities consistent with cellulitis. Multiple track marks. A few areas concerning for abscess with induration and some clear drainage but no fluctuance. Neurological:      General: No focal deficit present. Mental Status: He is alert and oriented to person, place, and time. Diagnostic Study Results     Labs -     Recent Results (from the past 12 hour(s))   METABOLIC PANEL, BASIC    Collection Time: 10/01/22  3:05 AM   Result Value Ref Range    Sodium 140 136 - 145 mmol/L    Potassium 3.7 3.5 - 5.5 mmol/L    Chloride 108 100 - 111 mmol/L    CO2 27 21 - 32 mmol/L    Anion gap 5 3.0 - 18 mmol/L    Glucose 91 74 - 99 mg/dL    BUN 9 7.0 - 18 MG/DL    Creatinine 0.84 0.6 - 1.3 MG/DL    BUN/Creatinine ratio 11 (L) 12 - 20      GFR est AA >60 >60 ml/min/1.73m2    GFR est non-AA >60 >60 ml/min/1.73m2    Calcium 8.7 8.5 - 10.1 MG/DL   MAGNESIUM    Collection Time: 10/01/22  3:05 AM   Result Value Ref Range    Magnesium 2.5 1.6 - 2.6 mg/dL   CBC WITH AUTOMATED DIFF    Collection Time: 10/01/22  3:05 AM   Result Value Ref Range    WBC 3.7 (L) 4.6 - 13.2 K/uL    RBC 3.64 (L) 4.35 - 5.65 M/uL    HGB 10.2 (L) 13.0 - 16.0 g/dL    HCT 30.8 (L) 36.0 - 48.0 %    MCV 84.6 78.0 - 100.0 FL    MCH 28.0 24.0 - 34.0 PG    MCHC 33.1 31.0 - 37.0 g/dL    RDW 12.6 11.6 - 14.5 %    PLATELET 620 597 - 997 K/uL    MPV 10.0 9.2 - 11.8 FL    NRBC 0.0 0  WBC    ABSOLUTE NRBC 0.00 0.00 - 0.01 K/uL    NEUTROPHILS 57 40 - 73 %    LYMPHOCYTES 21 21 - 52 %    MONOCYTES 15 (H) 3 - 10 %    EOSINOPHILS 7 (H) 0 - 5 %    BASOPHILS 1 0 - 2 %    IMMATURE GRANULOCYTES 1 (H) 0.0 - 0.5 %    ABS. NEUTROPHILS 2.1 1.8 - 8.0 K/UL    ABS.  LYMPHOCYTES 0.8 (L) 0.9 - 3.6 K/UL    ABS. MONOCYTES 0.5 0.05 - 1.2 K/UL    ABS. EOSINOPHILS 0.3 0.0 - 0.4 K/UL    ABS. BASOPHILS 0.0 0.0 - 0.1 K/UL    ABS. IMM. GRANS. 0.0 0.00 - 0.04 K/UL    DF AUTOMATED         Radiologic Studies -   XR FOREARM RT AP/LAT   Final Result      No fracture. Nonspecific soft tissue swelling along the ventral surface of the forearm and   along the antecubital fossa, perhaps edema or cellulitis. No radiopaque foreign   body. XR FOREARM LT AP/LAT   Final Result      No fracture. Nonspecific soft tissue swelling greatest along the ventral surface   of the forearm and antecubital fossa, perhaps cellulitis or edema. XR HUMERUS RT   Final Result      No fracture. Possible antecubital fossa region soft tissue swelling, perhaps   edema or contusion. DUPLEX UPPER EXT VENOUS BILAT    (Results Pending)     CT Results  (Last 48 hours)      None          CXR Results  (Last 48 hours)      None            Medical Decision Making   I am the first provider for this patient. I reviewed the vital signs, available nursing notes, past medical history, past surgical history, family history and social history. Vital Signs-Reviewed the patient's vital signs. Patient Vitals for the past 12 hrs:   Temp Pulse Resp BP SpO2   10/01/22 0810 98.2 °F (36.8 °C) 71 16 (!) 116/53 98 %   10/01/22 0800 -- 84 -- -- --   10/01/22 0347 97.9 °F (36.6 °C) 79 18 (!) 103/55 99 %     Records Reviewed: Nursing Notes    Provider Notes (Medical Decision Making):   81-year-old male with ongoing IVDA with heroin and cocaine use presenting with cellulitis bilateral upper extremities. Last use about 1 hour ago. He is afebrile and vital signs stable. He has significant cellulitis with induration and swelling of bilateral upper extremities mostly around track marks. No obvious fluctuance or obvious abscess on my exam.  Plain film negative for foreign body or subcutaneous gas or emphysema per my interpretation.   I will obtain blood cultures, start on IV antibiotics, and admit for failed outpatient therapy. ED Course:   Initial assessment performed. The patients presenting problems have been discussed, and they are in agreement with the care plan formulated and outlined with them. I have encouraged them to ask questions as they arise throughout their visit. Admission Note:  Patient is being admitted to the hospital by Dr. Pritesh Overton, Service: Hospitalist.  The results of their tests and reasons for their admission have been discussed with them and available family. They convey agreement and understanding for the need to be admitted and for their admission diagnosis. Disposition:  Admit      Diagnosis     Clinical Impression:   1. Cellulitis at injection site    2. IV drug abuse [facility])        Attestations:  I am the first and primary provider of record for this patient's ED encounter. I personally performed the services described above in this documentation. Iva Lares MD    Please note that this dictation was completed with [employer], the computer voice recognition software. Quite often unanticipated grammatical, syntax, homophones, and other interpretive errors are inadvertently transcribed by the computer software. Please disregard these errors. Please excuse any errors that have escaped final proofreading. Thank you.

## 2022-10-01 NOTE — PROGRESS NOTES
Problem: Falls - Risk of  Goal: *Absence of Falls  Description: Document [facility] Fall Risk and appropriate interventions in the flowsheet.   Outcome: Progressing Towards Goal  Note: Fall Risk Interventions:            Medication Interventions: Patient to call before getting OOB, Teach patient to arise slowly                   Problem: Patient Education: Go to Patient Education Activity  Goal: Patient/Family Education  Outcome: Progressing Towards Goal

## 2022-10-02 LAB
AMMONIA PLAS-SCNC: 47 UMOL/L (ref 11–32)
AMPHET UR QL SCN: NEGATIVE
ANION GAP SERPL CALC-SCNC: 3 MMOL/L (ref 3–18)
BARBITURATES UR QL SCN: NEGATIVE
BASOPHILS # BLD: 0 K/UL (ref 0–0.1)
BASOPHILS NFR BLD: 0 % (ref 0–2)
BENZODIAZ UR QL: POSITIVE
BUN SERPL-MCNC: 10 MG/DL (ref 7–18)
BUN/CREAT SERPL: 12 (ref 12–20)
CALCIUM SERPL-MCNC: 8.1 MG/DL (ref 8.5–10.1)
CANNABINOIDS UR QL SCN: NEGATIVE
CHLORIDE SERPL-SCNC: 107 MMOL/L (ref 100–111)
CO2 SERPL-SCNC: 29 MMOL/L (ref 21–32)
COCAINE UR QL SCN: POSITIVE
CREAT SERPL-MCNC: 0.85 MG/DL (ref 0.6–1.3)
DIFFERENTIAL METHOD BLD: ABNORMAL
EOSINOPHIL # BLD: 0.2 K/UL (ref 0–0.4)
EOSINOPHIL NFR BLD: 4 % (ref 0–5)
ERYTHROCYTE [DISTWIDTH] IN BLOOD BY AUTOMATED COUNT: 12.5 % (ref 11.6–14.5)
GLUCOSE SERPL-MCNC: 115 MG/DL (ref 74–99)
HCT VFR BLD AUTO: 30.8 % (ref 36–48)
HDSCOM,HDSCOM: ABNORMAL
HGB BLD-MCNC: 10 G/DL (ref 13–16)
IMM GRANULOCYTES # BLD AUTO: 0 K/UL (ref 0–0.04)
IMM GRANULOCYTES NFR BLD AUTO: 0 % (ref 0–0.5)
LYMPHOCYTES # BLD: 0.4 K/UL (ref 0.9–3.6)
LYMPHOCYTES NFR BLD: 9 % (ref 21–52)
MAGNESIUM SERPL-MCNC: 2.1 MG/DL (ref 1.6–2.6)
MCH RBC QN AUTO: 27.6 PG (ref 24–34)
MCHC RBC AUTO-ENTMCNC: 32.5 G/DL (ref 31–37)
MCV RBC AUTO: 85.1 FL (ref 78–100)
METHADONE UR QL: POSITIVE
MONOCYTES # BLD: 0.2 K/UL (ref 0.05–1.2)
MONOCYTES NFR BLD: 6 % (ref 3–10)
NEUTS SEG # BLD: 3.3 K/UL (ref 1.8–8)
NEUTS SEG NFR BLD: 81 % (ref 40–73)
NRBC # BLD: 0 K/UL (ref 0–0.01)
NRBC BLD-RTO: 0 PER 100 WBC
OPIATES UR QL: POSITIVE
PCP UR QL: NEGATIVE
PLATELET # BLD AUTO: 162 K/UL (ref 135–420)
PMV BLD AUTO: 9.8 FL (ref 9.2–11.8)
POTASSIUM SERPL-SCNC: 3.8 MMOL/L (ref 3.5–5.5)
RBC # BLD AUTO: 3.62 M/UL (ref 4.35–5.65)
SODIUM SERPL-SCNC: 139 MMOL/L (ref 136–145)
VANCOMYCIN SERPL-MCNC: 12 UG/ML (ref 5–40)
WBC # BLD AUTO: 4.1 K/UL (ref 4.6–13.2)

## 2022-10-02 PROCEDURE — 74011000250 HC RX REV CODE- 250: Performed by: HOSPITALIST

## 2022-10-02 PROCEDURE — 80202 ASSAY OF VANCOMYCIN: CPT

## 2022-10-02 PROCEDURE — 87040 BLOOD CULTURE FOR BACTERIA: CPT

## 2022-10-02 PROCEDURE — 74011000258 HC RX REV CODE- 258: Performed by: HOSPITALIST

## 2022-10-02 PROCEDURE — 65270000029 HC RM PRIVATE

## 2022-10-02 PROCEDURE — 85025 COMPLETE CBC W/AUTO DIFF WBC: CPT

## 2022-10-02 PROCEDURE — 74011250637 HC RX REV CODE- 250/637: Performed by: HOSPITALIST

## 2022-10-02 PROCEDURE — 82140 ASSAY OF AMMONIA: CPT

## 2022-10-02 PROCEDURE — 80048 BASIC METABOLIC PNL TOTAL CA: CPT

## 2022-10-02 PROCEDURE — 83735 ASSAY OF MAGNESIUM: CPT

## 2022-10-02 PROCEDURE — 80307 DRUG TEST PRSMV CHEM ANLYZR: CPT

## 2022-10-02 PROCEDURE — 36415 COLL VENOUS BLD VENIPUNCTURE: CPT

## 2022-10-02 PROCEDURE — 74011250636 HC RX REV CODE- 250/636: Performed by: HOSPITALIST

## 2022-10-02 RX ORDER — HYDROCORTISONE 1 %
CREAM (GRAM) TOPICAL 2 TIMES DAILY
Status: DISCONTINUED | OUTPATIENT
Start: 2022-10-02 | End: 2022-10-04 | Stop reason: HOSPADM

## 2022-10-02 RX ADMIN — Medication 100 MG: at 08:10

## 2022-10-02 RX ADMIN — HYDROMORPHONE HYDROCHLORIDE 1 MG: 1 INJECTION, SOLUTION INTRAMUSCULAR; INTRAVENOUS; SUBCUTANEOUS at 21:40

## 2022-10-02 RX ADMIN — LORAZEPAM 0.5 MG: 0.5 TABLET ORAL at 06:33

## 2022-10-02 RX ADMIN — PIPERACILLIN AND TAZOBACTAM 3.38 G: 3; .375 INJECTION, POWDER, FOR SOLUTION INTRAVENOUS at 06:33

## 2022-10-02 RX ADMIN — SODIUM CHLORIDE, PRESERVATIVE FREE 20 ML: 5 INJECTION INTRAVENOUS at 06:32

## 2022-10-02 RX ADMIN — HYDROCORTISONE: 0.01 CREAM TOPICAL at 21:48

## 2022-10-02 RX ADMIN — LORAZEPAM 0.5 MG: 0.5 TABLET ORAL at 00:37

## 2022-10-02 RX ADMIN — VANCOMYCIN HYDROCHLORIDE 1250 MG: 10 INJECTION, POWDER, LYOPHILIZED, FOR SOLUTION INTRAVENOUS at 08:08

## 2022-10-02 RX ADMIN — FOLIC ACID 1 MG: 1 TABLET ORAL at 08:10

## 2022-10-02 RX ADMIN — SODIUM CHLORIDE, PRESERVATIVE FREE 10 ML: 5 INJECTION INTRAVENOUS at 14:00

## 2022-10-02 RX ADMIN — GABAPENTIN 300 MG: 300 CAPSULE ORAL at 21:01

## 2022-10-02 RX ADMIN — METHADONE HYDROCHLORIDE 40 MG: 10 TABLET ORAL at 08:09

## 2022-10-02 RX ADMIN — PIPERACILLIN AND TAZOBACTAM 3.38 G: 3; .375 INJECTION, POWDER, FOR SOLUTION INTRAVENOUS at 17:48

## 2022-10-02 RX ADMIN — PIPERACILLIN AND TAZOBACTAM 3.38 G: 3; .375 INJECTION, POWDER, FOR SOLUTION INTRAVENOUS at 00:37

## 2022-10-02 RX ADMIN — GABAPENTIN 300 MG: 300 CAPSULE ORAL at 08:08

## 2022-10-02 RX ADMIN — SODIUM CHLORIDE, PRESERVATIVE FREE 10 ML: 5 INJECTION INTRAVENOUS at 06:32

## 2022-10-02 RX ADMIN — GABAPENTIN 300 MG: 300 CAPSULE ORAL at 15:07

## 2022-10-02 RX ADMIN — PIPERACILLIN AND TAZOBACTAM 3.38 G: 3; .375 INJECTION, POWDER, FOR SOLUTION INTRAVENOUS at 12:10

## 2022-10-02 RX ADMIN — SODIUM CHLORIDE, PRESERVATIVE FREE 20 ML: 5 INJECTION INTRAVENOUS at 21:40

## 2022-10-02 RX ADMIN — SODIUM CHLORIDE, PRESERVATIVE FREE 10 ML: 5 INJECTION INTRAVENOUS at 21:41

## 2022-10-02 RX ADMIN — VANCOMYCIN HYDROCHLORIDE 1250 MG: 10 INJECTION, POWDER, LYOPHILIZED, FOR SOLUTION INTRAVENOUS at 21:01

## 2022-10-02 NOTE — PROGRESS NOTES
[address] [facility] Pharmacokinetic Monitoring Service - Vancomycin    Consulting Provider: Marian Stephens   Indication: SSTI x 7 days  Target Concentration: Goal AUC/-600 mg*hr/L  Day of Therapy: 3  Additional Antimicrobials: Ceftriaxone    Pertinent Laboratory Values: Wt Readings from Last 1 Encounters:   09/30/22 74.8 kg (165 lb)     Temp Readings from Last 1 Encounters:   10/02/22 (!) 100.6 °F (38.1 °C)     No components found for: PROCAL  Estimated Creatinine Clearance: 117.4 mL/min (based on SCr of 0.85 mg/dL).   Recent Labs     10/02/22  0153 10/01/22  0305   WBC 4.1* 3.7*       Assessment:  Date/Time Current Dose Concentration Timing of Concentration (h) AUC   10/02/22 0952 Vanc 1250mg IV Q12H 12.0 mcg/mL 10/02/22 0153 420 mg/L.hr   Note: Serum concentrations collected for AUC dosing may appear elevated if collected in close proximity to the dose administered, this is not necessarily an indication of toxicity    Plan:  Current dosing regimen is therapeutic  Continue Vancomycin 1250mg IV Q12H for  mg/L.hr  Pharmacy will continue to monitor patient and adjust therapy as indicated      Olita Buerger, [place]  10/2/2022 9:51 AM

## 2022-10-02 NOTE — PROGRESS NOTES
Hospitalist Progress Note-critical care note     Patient: Negin Brothers MRN: [id number]  CSN: [id number]    YOB: 1985  Age: 29 y.o. Sex: male    DOA: 9/30/2022 LOS:  LOS: 2 days            Chief complaint: cellulitis , polysubstance use   Assessment/Plan         Hospital Problems  Date Reviewed: 4/26/2022            Codes Class Noted POA    Cellulitis of extremity ICD-10-CM: L03.119  ICD-9-CM: Candie Sjogren  9/30/2022 Unknown        Smoker ICD-10-CM: F17.200  ICD-9-CM: 305.1  4/27/2022 Yes        Alcohol abuse ICD-10-CM: F10.10  ICD-9-CM: 305.00  7/21/2021 Yes        Hepatitis-C ICD-10-CM: B19.20  ICD-9-CM: 070.70  7/21/2021 Yes        Polysubstance abuse ([facility] [address] ICD-10-CM: F19.10  ICD-9-CM: 305.90  6/15/2013 Yes          50-year-old male who is a smoker with polysubstance abuse is admitted for right arm cellulitis s/p heroin injection. arm cellulitis and rt foot   Resolving, however fever am -will repeat blood cx  Improving and continue IV antibiotics with Zosyn and vancomycin  Duplex ultrasound no DVT, but thrombosis in superficial veins   Pain control with dilaudid and Toradol      Smoker  Smoking cessation recommended  Nicotine patch ordered     Polysubstance abuse  continue methadone   continue educate   Need rehab -he prefers the one at [place] per  stated   Watch for withdrawal   Ativan ,clonidine atarax seizure precaution , imodium      Hcv need to be treated if he agrees   Will check ammonia level      Alcohol drinking   Ciwa protocol   Ativan schedued        Subjective I feel OK   He looks sleepy today, will check uds and repeat bcx     Disposition :tbd,   Review of systems:    General: No fevers or chills. Cardiovascular: No chest pain or pressure. No palpitations. Pulmonary: No shortness of breath. Gastrointestinal: No nausea, vomiting.      Vital signs/Intake and Output:  Visit Vitals  /69 (BP 1 Location: Right upper arm, BP Patient Position: At rest)   Pulse 86 Temp (!) 100.6 °F (38.1 °C)   Resp 18   Ht 5' 8\" (1.727 m)   Wt 74.8 kg (165 lb)   SpO2 93%   BMI 25.09 kg/m²     Current Shift:  No intake/output data recorded. Last three shifts:  No intake/output data recorded. Physical Exam:  General: WD, WN. Alert, cooperative, no acute distress    HEENT: NC, Atraumatic. PERRLA, anicteric sclerae. Lungs: CTA Bilaterally. No Wheezing/Rhonchi/Rales. Heart:  Regular  rhythm,  No murmur, No Rubs, No Gallops  Abdomen: Soft, Non distended, Non tender. +Bowel sounds,   Extremities: No c/c erythema on arms and hand /foot better, rash on left arm resolving   Psych:   Not anxious or agitated. Neurologic:  No acute neurological deficit. Labs: Results:       Chemistry Recent Labs     10/02/22  0153 10/01/22  0305 09/30/22  1800   * 91 101*    140 138   K 3.8 3.7 3.7    108 104   CO2 29 27 29   BUN 10 9 8   CREA 0.85 0.84 0.80   CA 8.1* 8.7 8.6   AGAP 3 5 5   BUCR 12 11* 10*   AP  --   --  79   TP  --   --  6.7   ALB  --   --  3.5   GLOB  --   --  3.2   AGRAT  --   --  1.1        CBC w/Diff Recent Labs     10/02/22  0153 10/01/22  0305 09/30/22  1800   WBC 4.1* 3.7* 4.9   RBC 3.62* 3.64* 3.70*   HGB 10.0* 10.2* 10.3*   HCT 30.8* 30.8* 30.6*    201 204   GRANS 81* 57 65   LYMPH 9* 21 18*   EOS 4 7* 6*        Cardiac Enzymes No results for input(s): CPK, CKND1, BUD in the last 72 hours. No lab exists for component: CKRMB, TROIP   Coagulation No results for input(s): PTP, INR, APTT, INREXT, INREXT in the last 72 hours. Lipid Panel No results found for: CHOL, CHOLPOCT, CHOLX, CHLST, CHOLV, 285769, HDL, HDLP, LDL, LDLC, DLDLP, 136821, VLDLC, VLDL, TGLX, TRIGL, TRIGP, TGLPOCT, CHHD, CHHDX   BNP No results for input(s): BNPP in the last 72 hours.    Liver Enzymes Recent Labs     09/30/22  1800   TP 6.7   ALB 3.5   AP 79        Thyroid Studies Lab Results   Component Value Date/Time    TSH 1.49 03/11/2016 02:55 AM        Procedures/imaging: see electronic medical records for all procedures/Xrays and details which were not copied into this note but were reviewed prior to creation of Plan    XR HUMERUS RT    Result Date: 9/30/2022  Right humerus History: Soft tissue swelling Comparison:  No prior study Findings: Two views of the humerus performed. No fracture or dislocation is seen. Alignment is unremarkable. Soft tissue swelling may be present at the antecubital fossa region, not definitive. No radiopaque foreign body is seen. No fracture. Possible antecubital fossa region soft tissue swelling, perhaps edema or contusion. XR FOREARM LT AP/LAT    Result Date: 9/30/2022  Left forearm History: Soft tissue swelling Comparison: None Two views of the forearm demonstrate no fracture or dislocation. Soft tissue swelling is seen along the forearm, particularly the ventral surface, extending proximally to the antecubital fossa region. No soft tissue gas is present. Alignment is unremarkable. No radiopaque foreign body is seen. No fracture. Nonspecific soft tissue swelling greatest along the ventral surface of the forearm and antecubital fossa, perhaps cellulitis or edema. XR FOREARM RT AP/LAT    Result Date: 9/30/2022  Right forearm History: Soft tissue swelling Comparison: None Two views of the forearm demonstrate no fracture or dislocation. Mild soft tissue swelling is suggested along the ventral surface of the forearm and antecubital fossa. Alignment is unremarkable. No radiopaque foreign body is seen. No fracture. Nonspecific soft tissue swelling along the ventral surface of the forearm and along the antecubital fossa, perhaps edema or cellulitis. No radiopaque foreign body.        Thiago Geren MD

## 2022-10-02 NOTE — PROGRESS NOTES
Problem: Falls - Risk of  Goal: *Absence of Falls  Description: Document Torresyasmin Vidaler Fall Risk and appropriate interventions in the flowsheet.   Outcome: Progressing Towards Goal  Note: Fall Risk Interventions:            Medication Interventions: Teach patient to arise slowly                   Problem: Cellulitis Care Plan (Adult)  Goal: *Control of acute pain  Outcome: Progressing Towards Goal     Problem: Patient Education: Go to Patient Education Activity  Goal: Patient/Family Education  Outcome: Progressing Towards Goal

## 2022-10-02 NOTE — PROGRESS NOTES
1118  Pt very agitated. Offered ativan due to withdrawal symptoms- pt refused. Offered other PRNs in [facility]- pt still refused. Pt educated. Discussed the need for a urine sample, pt stated \"I don't give a shit about the urine\" Pts girlfriend at bedside        12   Pt had a male visitor that stopped by for a few mins. Pt went straight to bathroom once visitor left and would not allow this nurse to come in bathroom. Pt was very drowsy and sleeping from 1300 until 1730. Urine drug screen completed. Hourly rounds completed. 18   Pts mother visiting at bedside. Pt gave this nurse permission to speak with his mother about his medical care.  Changed telemetry box to Box #45

## 2022-10-02 NOTE — PROGRESS NOTES
Problem: Falls - Risk of  Goal: *Absence of Falls  Description: Document [facility] Fall Risk and appropriate interventions in the flowsheet.   Outcome: Progressing Towards Goal  Note: Fall Risk Interventions:        Medication Interventions: Teach patient to arise slowly, Evaluate medications/consider consulting pharmacy      Problem: Cellulitis Care Plan (Adult)  Goal: *Control of acute pain  Outcome: Progressing Towards Goal

## 2022-10-03 ENCOUNTER — APPOINTMENT (OUTPATIENT)
Dept: NON INVASIVE DIAGNOSTICS | Age: 37
DRG: 603 | End: 2022-10-03
Attending: HOSPITALIST
Payer: COMMERCIAL

## 2022-10-03 LAB
ANION GAP SERPL CALC-SCNC: 2 MMOL/L (ref 3–18)
BASOPHILS # BLD: 0 K/UL (ref 0–0.1)
BASOPHILS NFR BLD: 0 % (ref 0–2)
BUN SERPL-MCNC: 7 MG/DL (ref 7–18)
BUN/CREAT SERPL: 7 (ref 12–20)
CALCIUM SERPL-MCNC: 8.9 MG/DL (ref 8.5–10.1)
CHLORIDE SERPL-SCNC: 111 MMOL/L (ref 100–111)
CO2 SERPL-SCNC: 27 MMOL/L (ref 21–32)
CREAT SERPL-MCNC: 0.95 MG/DL (ref 0.6–1.3)
DIFFERENTIAL METHOD BLD: ABNORMAL
ECHO AO ROOT DIAM: 3.2 CM
ECHO AO ROOT INDEX: 1.7 CM/M2
ECHO AV AREA PEAK VELOCITY: 3.3 CM2
ECHO AV AREA VTI: 3.3 CM2
ECHO AV AREA/BSA PEAK VELOCITY: 1.8 CM2/M2
ECHO AV AREA/BSA VTI: 1.8 CM2/M2
ECHO AV MEAN GRADIENT: 3 MMHG
ECHO AV MEAN VELOCITY: 0.8 M/S
ECHO AV PEAK GRADIENT: 6 MMHG
ECHO AV PEAK VELOCITY: 1.2 M/S
ECHO AV VELOCITY RATIO: 1.08
ECHO AV VTI: 18.3 CM
ECHO LA DIAMETER INDEX: 1.6 CM/M2
ECHO LA DIAMETER: 3 CM
ECHO LA TO AORTIC ROOT RATIO: 0.94
ECHO LA VOL 2C: 32 ML (ref 18–58)
ECHO LA VOL 4C: 37 ML (ref 18–58)
ECHO LA VOL BP: 33 ML (ref 18–58)
ECHO LA VOL/BSA BIPLANE: 18 ML/M2 (ref 16–34)
ECHO LA VOLUME AREA LENGTH: 39 ML
ECHO LA VOLUME INDEX A2C: 17 ML/M2 (ref 16–34)
ECHO LA VOLUME INDEX A4C: 20 ML/M2 (ref 16–34)
ECHO LA VOLUME INDEX AREA LENGTH: 21 ML/M2 (ref 16–34)
ECHO LV E' LATERAL VELOCITY: 23 CM/S
ECHO LV E' SEPTAL VELOCITY: 10 CM/S
ECHO LV EDV A2C: 89 ML
ECHO LV EDV A4C: 108 ML
ECHO LV EDV BP: 100 ML (ref 67–155)
ECHO LV EDV INDEX A4C: 57 ML/M2
ECHO LV EDV INDEX BP: 53 ML/M2
ECHO LV EDV NDEX A2C: 47 ML/M2
ECHO LV EJECTION FRACTION A2C: 68 %
ECHO LV EJECTION FRACTION A4C: 72 %
ECHO LV EJECTION FRACTION BIPLANE: 68 % (ref 55–100)
ECHO LV ESV A2C: 29 ML
ECHO LV ESV A4C: 30 ML
ECHO LV ESV BP: 32 ML (ref 22–58)
ECHO LV ESV INDEX A2C: 15 ML/M2
ECHO LV ESV INDEX A4C: 16 ML/M2
ECHO LV ESV INDEX BP: 17 ML/M2
ECHO LV FRACTIONAL SHORTENING: 27 % (ref 28–44)
ECHO LV INTERNAL DIMENSION DIASTOLE INDEX: 2.39 CM/M2
ECHO LV INTERNAL DIMENSION DIASTOLIC: 4.5 CM (ref 4.2–5.9)
ECHO LV INTERNAL DIMENSION SYSTOLIC INDEX: 1.76 CM/M2
ECHO LV INTERNAL DIMENSION SYSTOLIC: 3.3 CM
ECHO LV IVSD: 0.8 CM (ref 0.6–1)
ECHO LV MASS 2D: 104.5 G (ref 88–224)
ECHO LV MASS INDEX 2D: 55.6 G/M2 (ref 49–115)
ECHO LV POSTERIOR WALL DIASTOLIC: 0.7 CM (ref 0.6–1)
ECHO LV RELATIVE WALL THICKNESS RATIO: 0.31
ECHO LVOT AREA: 3.1 CM2
ECHO LVOT AV VTI INDEX: 1.05
ECHO LVOT DIAM: 2 CM
ECHO LVOT MEAN GRADIENT: 4 MMHG
ECHO LVOT PEAK GRADIENT: 6 MMHG
ECHO LVOT PEAK VELOCITY: 1.3 M/S
ECHO LVOT STROKE VOLUME INDEX: 32.1 ML/M2
ECHO LVOT SV: 60.3 ML
ECHO LVOT VTI: 19.2 CM
ECHO MV A VELOCITY: 0.7 M/S
ECHO MV E DECELERATION TIME (DT): 174.7 MS
ECHO MV E VELOCITY: 0.8 M/S
ECHO MV E/A RATIO: 1.14
ECHO MV E/E' LATERAL: 3.48
ECHO MV E/E' RATIO (AVERAGED): 5.74
ECHO MV E/E' SEPTAL: 8
ECHO PV ACCELERATION TIME (AT): 74.2 MS
ECHO PV MAX VELOCITY: 1.1 M/S
ECHO PV PEAK GRADIENT: 4 MMHG
ECHO RV FREE WALL PEAK S': 21 CM/S
ECHO RV INTERNAL DIMENSION: 4 CM
ECHO RV TAPSE: 3.4 CM (ref 1.7–?)
EOSINOPHIL # BLD: 0.2 K/UL (ref 0–0.4)
EOSINOPHIL NFR BLD: 5 % (ref 0–5)
ERYTHROCYTE [DISTWIDTH] IN BLOOD BY AUTOMATED COUNT: 12.8 % (ref 11.6–14.5)
GLUCOSE SERPL-MCNC: 119 MG/DL (ref 74–99)
HCT VFR BLD AUTO: 37.8 % (ref 36–48)
HGB BLD-MCNC: 12.4 G/DL (ref 13–16)
IMM GRANULOCYTES # BLD AUTO: 0 K/UL (ref 0–0.04)
IMM GRANULOCYTES NFR BLD AUTO: 0 % (ref 0–0.5)
LYMPHOCYTES # BLD: 0.7 K/UL (ref 0.9–3.6)
LYMPHOCYTES NFR BLD: 14 % (ref 21–52)
MAGNESIUM SERPL-MCNC: 2 MG/DL (ref 1.6–2.6)
MCH RBC QN AUTO: 27.9 PG (ref 24–34)
MCHC RBC AUTO-ENTMCNC: 32.8 G/DL (ref 31–37)
MCV RBC AUTO: 84.9 FL (ref 78–100)
MONOCYTES # BLD: 0.3 K/UL (ref 0.05–1.2)
MONOCYTES NFR BLD: 6 % (ref 3–10)
NEUTS SEG # BLD: 3.7 K/UL (ref 1.8–8)
NEUTS SEG NFR BLD: 75 % (ref 40–73)
NRBC # BLD: 0 K/UL (ref 0–0.01)
NRBC BLD-RTO: 0 PER 100 WBC
PLATELET # BLD AUTO: 192 K/UL (ref 135–420)
PMV BLD AUTO: 9.9 FL (ref 9.2–11.8)
POTASSIUM SERPL-SCNC: 4.1 MMOL/L (ref 3.5–5.5)
RBC # BLD AUTO: 4.45 M/UL (ref 4.35–5.65)
SODIUM SERPL-SCNC: 140 MMOL/L (ref 136–145)
WBC # BLD AUTO: 5 K/UL (ref 4.6–13.2)

## 2022-10-03 PROCEDURE — 93306 TTE W/DOPPLER COMPLETE: CPT

## 2022-10-03 PROCEDURE — 83735 ASSAY OF MAGNESIUM: CPT

## 2022-10-03 PROCEDURE — 74011000258 HC RX REV CODE- 258: Performed by: HOSPITALIST

## 2022-10-03 PROCEDURE — 85025 COMPLETE CBC W/AUTO DIFF WBC: CPT

## 2022-10-03 PROCEDURE — 65270000029 HC RM PRIVATE

## 2022-10-03 PROCEDURE — 74011000250 HC RX REV CODE- 250: Performed by: HOSPITALIST

## 2022-10-03 PROCEDURE — 74011250636 HC RX REV CODE- 250/636: Performed by: HOSPITALIST

## 2022-10-03 PROCEDURE — 74011250637 HC RX REV CODE- 250/637: Performed by: HOSPITALIST

## 2022-10-03 PROCEDURE — 80048 BASIC METABOLIC PNL TOTAL CA: CPT

## 2022-10-03 RX ADMIN — SODIUM CHLORIDE, PRESERVATIVE FREE 20 ML: 5 INJECTION INTRAVENOUS at 06:37

## 2022-10-03 RX ADMIN — HYDROCORTISONE: 0.01 CREAM TOPICAL at 08:50

## 2022-10-03 RX ADMIN — GABAPENTIN 300 MG: 300 CAPSULE ORAL at 08:37

## 2022-10-03 RX ADMIN — PIPERACILLIN AND TAZOBACTAM 3.38 G: 3; .375 INJECTION, POWDER, FOR SOLUTION INTRAVENOUS at 06:28

## 2022-10-03 RX ADMIN — LORAZEPAM 0.5 MG: 0.5 TABLET ORAL at 06:27

## 2022-10-03 RX ADMIN — SODIUM CHLORIDE, PRESERVATIVE FREE 10 ML: 5 INJECTION INTRAVENOUS at 16:27

## 2022-10-03 RX ADMIN — HYDROMORPHONE HYDROCHLORIDE 1 MG: 1 INJECTION, SOLUTION INTRAMUSCULAR; INTRAVENOUS; SUBCUTANEOUS at 20:30

## 2022-10-03 RX ADMIN — ENOXAPARIN SODIUM 40 MG: 100 INJECTION SUBCUTANEOUS at 08:37

## 2022-10-03 RX ADMIN — PIPERACILLIN AND TAZOBACTAM 3.38 G: 3; .375 INJECTION, POWDER, FOR SOLUTION INTRAVENOUS at 23:43

## 2022-10-03 RX ADMIN — LORAZEPAM 0.5 MG: 0.5 TABLET ORAL at 17:58

## 2022-10-03 RX ADMIN — HYDROCORTISONE: 0.01 CREAM TOPICAL at 22:31

## 2022-10-03 RX ADMIN — GABAPENTIN 300 MG: 300 CAPSULE ORAL at 22:07

## 2022-10-03 RX ADMIN — PIPERACILLIN AND TAZOBACTAM 3.38 G: 3; .375 INJECTION, POWDER, FOR SOLUTION INTRAVENOUS at 00:31

## 2022-10-03 RX ADMIN — LORAZEPAM 0.5 MG: 0.5 TABLET ORAL at 23:45

## 2022-10-03 RX ADMIN — LORAZEPAM 1 MG: 1 TABLET ORAL at 22:31

## 2022-10-03 RX ADMIN — SODIUM CHLORIDE, PRESERVATIVE FREE 10 ML: 5 INJECTION INTRAVENOUS at 22:34

## 2022-10-03 RX ADMIN — PIPERACILLIN AND TAZOBACTAM 3.38 G: 3; .375 INJECTION, POWDER, FOR SOLUTION INTRAVENOUS at 16:26

## 2022-10-03 RX ADMIN — FOLIC ACID 1 MG: 1 TABLET ORAL at 08:37

## 2022-10-03 RX ADMIN — Medication 100 MG: at 08:37

## 2022-10-03 RX ADMIN — LORAZEPAM 0.5 MG: 0.5 TABLET ORAL at 00:31

## 2022-10-03 RX ADMIN — LORAZEPAM 1 MG: 1 TABLET ORAL at 11:48

## 2022-10-03 RX ADMIN — LORAZEPAM 0.5 MG: 0.5 TABLET ORAL at 11:47

## 2022-10-03 RX ADMIN — METHADONE HYDROCHLORIDE 40 MG: 10 TABLET ORAL at 08:36

## 2022-10-03 RX ADMIN — PIPERACILLIN AND TAZOBACTAM 3.38 G: 3; .375 INJECTION, POWDER, FOR SOLUTION INTRAVENOUS at 19:39

## 2022-10-03 RX ADMIN — LORAZEPAM 1 MG: 1 TABLET ORAL at 16:39

## 2022-10-03 RX ADMIN — SODIUM CHLORIDE, PRESERVATIVE FREE 10 ML: 5 INJECTION INTRAVENOUS at 06:37

## 2022-10-03 RX ADMIN — VANCOMYCIN HYDROCHLORIDE 1250 MG: 10 INJECTION, POWDER, LYOPHILIZED, FOR SOLUTION INTRAVENOUS at 08:50

## 2022-10-03 RX ADMIN — VANCOMYCIN HYDROCHLORIDE 1250 MG: 10 INJECTION, POWDER, LYOPHILIZED, FOR SOLUTION INTRAVENOUS at 20:30

## 2022-10-03 RX ADMIN — HYDROXYZINE HYDROCHLORIDE 25 MG: 25 TABLET, FILM COATED ORAL at 22:07

## 2022-10-03 RX ADMIN — GABAPENTIN 300 MG: 300 CAPSULE ORAL at 16:25

## 2022-10-03 RX ADMIN — ACETAMINOPHEN 650 MG: 325 TABLET, FILM COATED ORAL at 23:51

## 2022-10-03 NOTE — ANTIMICROBIAL STEWARDSHIP
Antimicrobial Stewardship Chart review: With focus on prescribed Antimicrobials, reviewed clinical presentation that includes fever and VS curve or trend, labs, Microbiology, imaging reports and notes as appropriate. Based on this brief analysis, here below are the suggestion. Diagnostic indication in chart for the Antimicrobial/s: soft tissue infection/ cellulitis injection site, IVDU, chronic Hep C, [facility]    CURRENT ANTIMICROBIALS: Vanco/Zosy/    DISCONTINUE THE FOLLOWING ANTIBIOTIC(S): streamlining ABX indicated      Rationale:    (  )  No evidence of bacterial infection                          ( X )  Microbiology report does not support use                          (  )  Narrowing broad-spectrum empiric coverage                          (  )  Therapeutic duplication: overlapping antimicrobial spectrum                          (  )  Clinical situation dictates change                          (  )  Prolonged duration of therapy not needed                          (  )  Allergy/toxicity/drug interaction present                          (  ) More clinically/cost effective therapy available  ADD ANTIBIOTICS  Rationale:        (  ) Microbiology report supports use                          (  ) Expanded coverage needed: gm positive /negative / anaerobic / atypical infection possible                          (  ) More clinicaly/cost effective therapy than current regimen                          (  ) Needed for synergy                          (  ) Double coverage needed                          (  ) Less toxic therapy                          (  ) Antifungal therapy needed    No  Change in Antibiotics: ( )                               COMMENTS: consider ID consult for emperic streamlining of ABX      This communication is not a consultation. If a thorough analysis of the case is desired, please request an ID consultation. MD Raymundo Alberts [facility]  Office #:     [phone]  [id number] [phone]   Office Fax: [phone]

## 2022-10-03 NOTE — PROGRESS NOTES
Problem: Falls - Risk of  Goal: *Absence of Falls  Description: Document Lurdes Skill Fall Risk and appropriate interventions in the flowsheet.   Outcome: Progressing Towards Goal  Note: Fall Risk Interventions:            Medication Interventions: Teach patient to arise slowly         History of Falls Interventions: Door open when patient unattended         Problem: Patient Education: Go to Patient Education Activity  Goal: Patient/Family Education  Outcome: Progressing Towards Goal     Problem: Cellulitis Care Plan (Adult)  Goal: *Control of acute pain  Outcome: Progressing Towards Goal

## 2022-10-03 NOTE — ROUTINE PROCESS
Bedside shift change report given to Teo Sanchez RN (oncoming nurse) by Peter Tirado RN   (offgoing nurse). Report included the following information SBAR, Kardex, Intake/Output, and Recent Results.
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
## 2022-10-04 VITALS
DIASTOLIC BLOOD PRESSURE: 56 MMHG | WEIGHT: 165 LBS | SYSTOLIC BLOOD PRESSURE: 115 MMHG | HEART RATE: 70 BPM | HEIGHT: 68 IN | OXYGEN SATURATION: 98 % | RESPIRATION RATE: 16 BRPM | BODY MASS INDEX: 25.01 KG/M2 | TEMPERATURE: 97.8 F

## 2022-10-04 LAB
COVID-19 RAPID TEST, COVR: ABNORMAL
SOURCE, COVRS: ABNORMAL

## 2022-10-04 PROCEDURE — 74011250637 HC RX REV CODE- 250/637: Performed by: HOSPITALIST

## 2022-10-04 PROCEDURE — 74011250636 HC RX REV CODE- 250/636: Performed by: HOSPITALIST

## 2022-10-04 PROCEDURE — 87635 SARS-COV-2 COVID-19 AMP PRB: CPT

## 2022-10-04 RX ORDER — AMOXICILLIN AND CLAVULANATE POTASSIUM 875; 125 MG/1; MG/1
1 TABLET, FILM COATED ORAL EVERY 12 HOURS
Qty: 5 TABLET | Refills: 0 | Status: SHIPPED | OUTPATIENT
Start: 2022-10-04 | End: 2022-10-07

## 2022-10-04 RX ORDER — AMOXICILLIN AND CLAVULANATE POTASSIUM 875; 125 MG/1; MG/1
1 TABLET, FILM COATED ORAL EVERY 12 HOURS
Status: DISCONTINUED | OUTPATIENT
Start: 2022-10-04 | End: 2022-10-04 | Stop reason: HOSPADM

## 2022-10-04 RX ORDER — LORAZEPAM 0.5 MG/1
0.5 TABLET ORAL EVERY 8 HOURS
Status: DISCONTINUED | OUTPATIENT
Start: 2022-10-04 | End: 2022-10-04 | Stop reason: HOSPADM

## 2022-10-04 RX ORDER — HYDROXYZINE 25 MG/1
25 TABLET, FILM COATED ORAL
Qty: 3 TABLET | Refills: 0 | Status: SHIPPED | OUTPATIENT
Start: 2022-10-04 | End: 2022-10-05

## 2022-10-04 RX ADMIN — LORAZEPAM 0.5 MG: 0.5 TABLET ORAL at 06:13

## 2022-10-04 RX ADMIN — AMOXICILLIN AND CLAVULANATE POTASSIUM 1 TABLET: 875; 125 TABLET, FILM COATED ORAL at 10:06

## 2022-10-04 RX ADMIN — GABAPENTIN 300 MG: 300 CAPSULE ORAL at 09:43

## 2022-10-04 RX ADMIN — METHADONE HYDROCHLORIDE 40 MG: 10 TABLET ORAL at 09:43

## 2022-10-04 RX ADMIN — VANCOMYCIN HYDROCHLORIDE 1250 MG: 10 INJECTION, POWDER, LYOPHILIZED, FOR SOLUTION INTRAVENOUS at 10:02

## 2022-10-04 RX ADMIN — LOPERAMIDE HYDROCHLORIDE 2 MG: 2 CAPSULE ORAL at 06:23

## 2022-10-04 RX ADMIN — ENOXAPARIN SODIUM 40 MG: 100 INJECTION SUBCUTANEOUS at 09:44

## 2022-10-04 RX ADMIN — LORAZEPAM 2 MG: 1 TABLET ORAL at 02:57

## 2022-10-04 RX ADMIN — FOLIC ACID 1 MG: 1 TABLET ORAL at 09:43

## 2022-10-04 RX ADMIN — Medication 100 MG: at 09:43

## 2022-10-04 NOTE — PROGRESS NOTES
Problem: Falls - Risk of  Goal: *Absence of Falls  Description: Document Arlene Blake Fall Risk and appropriate interventions in the flowsheet.   Outcome: Progressing Towards Goal  Note: Fall Risk Interventions:            Medication Interventions: Assess postural VS orthostatic hypotension, Patient to call before getting OOB, Teach patient to arise slowly         History of Falls Interventions: Door open when patient unattended
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
## 2022-10-04 NOTE — PROGRESS NOTES
26 Pt discharge from hospital to facility for rehab. Report called to [facility] with no answer. Left message on answering service to return call for report. [address] report to Redmond Epley, LPN from [facility]. Report included the following information SBAR, Kardex, OR Summary, Intake/Output and MAR.
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
## 2022-10-04 NOTE — PROGRESS NOTES
D/C Plan: [facility]     CM has been contacted by [facility]; [phone]; [phone] fax) in [place], [address].  Pt's significant other, Mariaa Guy ([phone]), contacted this center for assistance. CM met with pt at bedside and he is agreeable to having clinical information sent to [facility]. CMS has been notified to assist.    CM contacted this facility and they are reviewing. If pt is accepted to this facility pt's significant other will transport him. CM to continue to follow and assist.    1325:  Pt has been accepted to [facility] ([address]; report [phone]) for admission today. CM contacted pt's significant other and she will transport this pt to this facility today. No other care transition needs have been identified.     Care Management Interventions  Mode of Transport at Discharge: Self  Transition of Care Consult (CM Consult): Discharge Planning  Support Systems: Parent(s)  Confirm Follow Up Transport: Family  The Plan for Transition of Care is Related to the Following Treatment Goals : cellulitis left arm  The Patient and/or Patient Representative was Provided with a Choice of Provider and Agrees with the Discharge Plan?: Yes  Name of the Patient Representative Who was Provided with a Choice of Provider and Agrees with the Discharge Plan: Ryan Scott, patient  Discharge Location  Patient Expects to be Discharged to[de-identified]  (substance abuse treatment program)

## 2022-10-04 NOTE — ROUTINE PROCESS
Pt mother given discharge packet to take to [facility] treatment facility since she stated she will transport pt instead of his girlfriend. Mother subsequently stated is was almost 3pm and too late to drive there and back tonight and she will transport son tomorrow morning.  Tabatha Biggs made aware and stated the family will have to contact the treatment facility after discharge if he does not arrive tonight to see if they will still accept pt. Pt mother Simon Capellan stated she spoke with  of the facility and was told she could bring him for processing at 2pm tomorrow. Pt proceeded to leave and was informed he had to wait for his covid results and pt subsequently got on elevator with his girlfriend and stated he had things to take care of and couldn't sit here and wait all day. Before pt left he handed this writer his IV he had pulled out. This writer inspected the IV and it was dated for today an 18g midline that measured 8cm and it was discarded into sharps container. Simon Capellan left a phone number in case she was needed at [phone].    [phone]: This writer received a call from the lab stating that pt covid test was run and results were invalid and they suggested recollection.  informed pt had already left and recollection impossible. Pt nurse Yemi De Souza was notified     [phone] with Nurse Harrison Best at [facility] and she stated they can perform covid testing on pt a the facility when he arrives.

## 2022-10-04 NOTE — ACP (ADVANCE CARE PLANNING)
Advance Care Planning   Advance Care Planning Inpatient Note  [facility]    Today's Date: 10/4/2022  Unit: THE [address] SURGICAL/ONCOLOGY    Received request from rounding. Upon review of chart and communication with care team, patient's decision making abilities are not in question. Patient was/were present in the room during visit.     Goals of ACP Conversation:  Discuss Advance Care planning documents    Health Care Decision Makers:      Primary Decision Maker: Sheyla Alonso - Mother - [phone]    Summary:  No Decision Maker named by patient at this time    Advance Care Planning Documents (Patient Wishes) on file:  None     Assessment:       Interventions:  Deferred conversation as patient not interested in completing an advance directive at this time    Care Preferences Communicated:  No    Outcomes/Plan:  ACP Discussion Refused    Chaplain Apryl on 10/4/2022 at 9:47 AM

## 2022-10-04 NOTE — PROGRESS NOTES
conducted an initial consultation and Spiritual Assessment for Jenna Damon, who is a 29 y. o.,male. Patients Primary Language is: [place]. According to the patients EMR [employer] Affiliation is: Mohsen Heller. Patient was not at all interested in completing an AMD and we stopped that conversation. The reason the Patient came to the hospital is:   Patient Active Problem List    Diagnosis Date Noted    Cellulitis of extremity 09/30/2022    Right arm cellulitis 04/27/2022    Smoker 04/27/2022    Alcohol abuse 07/21/2021    Hepatitis-C 07/21/2021    Polysubstance abuse ([facility] [address] 06/15/2013        The  provided the following Interventions:  Initiated a relationship of care and support. Provided chaplaincy education. Provided information about Spiritual Care Services. Offered assurance of continued prayers on patients behalf. Chart reviewed. Plan:  Chaplains will continue to follow and will provide pastoral care on an as needed/requested basis.  recommends bedside caregivers page  on duty if patient shows signs of acute spiritual or emotional distress.       Sister Daisyarceliaheather Real, [place], [address]  [phone]

## 2023-09-19 NOTE — ED PROVIDER NOTES
EMERGENCY DEPARTMENT HISTORY AND PHYSICAL EXAM    Date: 7/21/2021  Patient Name: Nola Frost    History of Presenting Illness     Chief Complaint   Patient presents with    Drug Overdose         History Provided By: EMS    6:55 AM  Nola Frost is a 30 y.o. male with PMHX of polysubstance abuse who presents to the emergency department C/O overdose. Per EMS they were called to patient's residence by his girlfriend who found him unresponsive on the porch. Girlfriend administered 2 mg of Narcan intranasally with no response. Police arrived and gave 4 mg of Narcan intranasally. EMS reports he remained hypoxic on nonrebreather with an altered mental status. Unable to obtain IV access. They deny any evidence of trauma on scene.      PCP: Jorge A Woods MD    Current Facility-Administered Medications   Medication Dose Route Frequency Provider Last Rate Last Admin    sodium chloride (NS) flush 5-10 mL  5-10 mL IntraVENous PRN Deena Gerard MD        piperacillin-tazobactam (ZOSYN) 4.5 g in 0.9% sodium chloride (MBP/ADV) 100 mL MBP  4.5 g IntraVENous Q6H Deena Gerard MD        levoFLOXacin (LEVAQUIN) 750 mg in D5W IVPB  750 mg IntraVENous Q24H Deena Gerard MD        vancomycin (VANCOCIN) 1,000 mg in 0.9% sodium chloride 250 mL (VIAL-MATE)  1,000 mg IntraVENous ONCE Deena Gerard  mL/hr at 07/21/21 0758 1,000 mg at 07/21/21 0758    clindamycin (CLEOCIN) 600mg NS 50 mL IVPB (premix)  600 mg IntraVENous Q8H Tamiko Gerard  mL/hr at 07/21/21 0731 600 mg at 07/21/21 0731    propofol (DIPRIVAN) 10 mg/mL infusion  0-50 mcg/kg/min IntraVENous TITRATE Leny Gerard MD 24.5 mL/hr at 07/21/21 0800 50 mcg/kg/min at 07/21/21 0800    furosemide (LASIX) injection 40 mg  40 mg IntraVENous NOW Deena Gerard MD        LORazepam (ATIVAN) injection 2 mg  2 mg IntraVENous NOW Cathy Gerardhleen Nash, MD        fentaNYL (PF) 900 mcg/30 ml infusion soln  0-200 mcg/hr IntraVENous Patient disheveled with poor ADL's. Staff needing to encouraged this patient to perform daily. Slow thoughts with thought blocking. Patient reports she has fixed delusional of people wanting to hurt her children. Patient paranoid. No interacting with internal stimuli noted. Patient has been getting out of her room more than and she did over the weekend. Patient calm and cooperative and compliant with care plan. Patient denies SI/HI no self harming behavior displayed, no aggressive behavior displayed towards others. Patient contracted safety with staff and unit.   Encouraged patient to live a drug free life style.  Encouraged this patient to seek substance abuse rehab to gain insight into illness and to gain coping skills to help facilitate soberity.  Discussed healthy coping skills and techniques.  Educated, reviewed  and discussed plan of care and medication regiment with this patient. Discussed and educated with this patient any concerns that needed to be addressed along with the importance of medication compliance  1:1 with this patient. Patient voices understanding of all teachings.     TITRATE Miriam Gerard MD 1.7 mL/hr at 07/21/21 0800 50 mcg/hr at 07/21/21 0800    cisatracurium (NIMBEX) 100 mg in 0.9% sodium chloride 100 mL infusion  0-10 mcg/kg/min IntraVENous TITRATE Hira Fraga MD        chlorhexidine (PERIDEX) 0.12 % mouthwash 10 mL  10 mL Oral Q12H Hira Fraga MD         Current Outpatient Medications   Medication Sig Dispense Refill    amphetamine-dextroamphetamine XR (Adderall XR) 20 mg XR capsule Take 20 mg by mouth three (3) times daily. Past History     Past Medical History:  Past Medical History:   Diagnosis Date    Drug abuse, IV ([facility] [address]     Heroin abuse ([facility] [address]     Psychiatric diagnosis     ADHD    Psychiatric diagnosis     drug abuse.  Psychiatric disorder     anxiety       Past Surgical History:  No past surgical history on file. Family History:  History reviewed. No pertinent family history.     Social History:  Social History     Tobacco Use    Smoking status: Current Every Day Smoker     Packs/day: 1.00    Smokeless tobacco: Never Used   Substance Use Topics    Alcohol use: Yes     Comment: daily    Drug use: Yes     Types: Heroin, Prescription, Cocaine       Allergies:  No Known Allergies      Review of Systems   Review of Systems   Unable to perform ROS: Mental status change         Physical Exam     Vitals:    07/21/21 0750 07/21/21 0751 07/21/21 0800 07/21/21 0805   BP: 105/69  104/69    Pulse: (!) 134 (!) 133 (!) 130 (!) 130   Resp: 20 20 20 24   Temp:       SpO2: (!) 86% (!) 87% (!) 89% 90%   Weight:         Physical Exam    Nursing notes and vital signs reviewed    Constitutional: Toxic appearing, severe distress  Head: Normocephalic, Atraumatic  Eyes: EOMI, large pupils that are reactive bilaterally  Neck: Supple  Cardiovascular: Tachycardic and regular rhythm, no murmurs, rubs, or gallops  Chest: Increased work of breathing and equal chest excursion bilaterally, tachypneic  Lungs: Diminished to ausculation bilaterally  Abdomen: Soft, non tender, non distended  Back: No evidence of trauma or deformity  Extremities: No evidence of trauma or deformity, no LE edema  Skin: Warm and diaphoretic, normal cap refill  Neuro: Alert and anxious, moves all 4 extremities, does not follow commands, face symmetric  Psychiatric: Anxious and agitated mood and affect      Diagnostic Study Results     Labs -     Recent Results (from the past 12 hour(s))   METABOLIC PANEL, COMPREHENSIVE    Collection Time: 07/21/21  7:04 AM   Result Value Ref Range    Sodium 139 136 - 145 mmol/L    Potassium 4.0 3.5 - 5.5 mmol/L    Chloride 99 (L) 100 - 111 mmol/L    CO2 24 21 - 32 mmol/L    Anion gap 16 3.0 - 18 mmol/L    Glucose 135 (H) 74 - 99 mg/dL    BUN 20 (H) 7.0 - 18 MG/DL    Creatinine 1.80 (H) 0.6 - 1.3 MG/DL    BUN/Creatinine ratio 11 (L) 12 - 20      GFR est AA 53 (L) >60 ml/min/1.73m2    GFR est non-AA 43 (L) >60 ml/min/1.73m2    Calcium 9.4 8.5 - 10.1 MG/DL    Bilirubin, total PENDING MG/DL    ALT (SGPT) 143 (H) 16 - 61 U/L    AST (SGOT) 187 (H) 10 - 38 U/L    Alk. phosphatase PENDING U/L    Protein, total PENDING g/dL    Albumin 4.4 3.4 - 5.0 g/dL    Globulin PENDING g/dL    A-G Ratio PENDING     CBC WITH AUTOMATED DIFF    Collection Time: 07/21/21  7:04 AM   Result Value Ref Range    WBC 13.5 (H) 4.6 - 13.2 K/uL    RBC 5.61 4.35 - 5.65 M/uL    HGB 16.9 (H) 13.0 - 16.0 g/dL    HCT 52.6 (H) 36.0 - 48.0 %    MCV 93.8 74.0 - 97.0 FL    MCH 30.1 24.0 - 34.0 PG    MCHC 32.1 31.0 - 37.0 g/dL    RDW 11.4 (L) 11.6 - 14.5 %    PLATELET 096 416 - 900 K/uL    MPV 10.7 9.2 - 11.8 FL    NEUTROPHILS PENDING %    LYMPHOCYTES PENDING %    MONOCYTES PENDING %    EOSINOPHILS PENDING %    BASOPHILS PENDING %    ABS. NEUTROPHILS PENDING K/UL    ABS. LYMPHOCYTES PENDING K/UL    ABS. MONOCYTES PENDING K/UL    ABS. EOSINOPHILS PENDING K/UL    ABS.  BASOPHILS PENDING K/UL    DF PENDING    MAGNESIUM    Collection Time: 07/21/21  7:04 AM   Result Value Ref Range Magnesium 3.1 (H) 1.6 - 2.6 mg/dL   PROTHROMBIN TIME + INR    Collection Time: 07/21/21  7:04 AM   Result Value Ref Range    Prothrombin time 14.5 11.5 - 15.2 sec    INR 1.2 0.8 - 1.2     PTT    Collection Time: 07/21/21  7:04 AM   Result Value Ref Range    aPTT 29.6 23.0 - 36.4 SEC   ETHYL ALCOHOL    Collection Time: 07/21/21  7:04 AM   Result Value Ref Range    ALCOHOL(ETHYL),SERUM 17 (H) 0 - 3 MG/DL   POC LACTIC ACID    Collection Time: 07/21/21  7:13 AM   Result Value Ref Range    Lactic Acid (POC) 9.60 (HH) 0.40 - 2.00 mmol/L   POC LACTIC ACID    Collection Time: 07/21/21  7:21 AM   Result Value Ref Range    Lactic Acid (POC) 9.48 (HH) 0.40 - 2.00 mmol/L   BLOOD GAS, ARTERIAL POC    Collection Time: 07/21/21  7:32 AM   Result Value Ref Range    FIO2 (POC) 100 %    pH (POC) 7.15 (LL) 7.35 - 7.45      pCO2 (POC) 54.2 (H) 35.0 - 45.0 MMHG    pO2 (POC) 31 (LL) 80 - 100 MMHG    HCO3 (POC) 18.7 (L) 22 - 26 MMOL/L    sO2 (POC) 42.6 (L) 92 - 97 %    Base deficit (POC) 10.7 mmol/L    Mode ASSIST CONTROL      Tidal volume 500 ml    Set Rate 24 bpm    PEEP/CPAP (POC) 10 cmH2O    Site RIGHT RADIAL      Specimen type (POC) ARTERIAL      Performed by Palma Pandey [facility])    EKG, 12 LEAD, INITIAL    Collection Time: 07/21/21  8:01 AM   Result Value Ref Range    Ventricular Rate 130 BPM    Atrial Rate 130 BPM    P-R Interval 134 ms    QRS Duration 80 ms    Q-T Interval 318 ms    QTC Calculation (Bezet) 467 ms    Calculated P Axis 77 degrees    Calculated R Axis 90 degrees    Calculated T Axis 60 degrees    Diagnosis       Sinus tachycardia with fusion complexes  Rightward axis  Borderline ECG  When compared with ECG of 28-DEC-2017 14:54,  fusion complexes are now present  Vent. rate has increased BY  57 BPM  Non-specific change in ST segment in Inferior leads         Radiologic Studies -   XR CHEST PORT   Final Result      Extensive bilateral parenchymal infiltrates.       CT HEAD WO CONT    (Results Pending)   XR ABD (KUB) (Results Pending)   XR CHEST PORT    (Results Pending)     CT Results  (Last 48 hours)    None        CXR Results  (Last 48 hours)               07/21/21 0732  XR CHEST PORT Final result    Impression:      Extensive bilateral parenchymal infiltrates. Narrative:  EXAM: XR CHEST PORT       CLINICAL INDICATION/HISTORY: meets SIRS criteria   -Additional: None       COMPARISON: 12/20/2017       TECHNIQUE: Portable frontal view of the chest       _______________       FINDINGS:       SUPPORT DEVICES: Endotracheal tube in the midthoracic trachea. Ysabel Linger HEART AND MEDIASTINUM: Cardiomediastinal silhouette within normal limits. LUNGS AND PLEURAL SPACES: Extensive bilateral parenchymal infiltrates.  No large   effusion or pneumothorax.       _______________                 Medications given in the ED-  Medications   sodium chloride (NS) flush 5-10 mL (has no administration in time range)   piperacillin-tazobactam (ZOSYN) 4.5 g in 0.9% sodium chloride (MBP/ADV) 100 mL MBP (has no administration in time range)   levoFLOXacin (LEVAQUIN) 750 mg in D5W IVPB (has no administration in time range)   vancomycin (VANCOCIN) 1,000 mg in 0.9% sodium chloride 250 mL (VIAL-MATE) (1,000 mg IntraVENous New Bag 7/21/21 0758)   clindamycin (CLEOCIN) 600mg NS 50 mL IVPB (premix) (600 mg IntraVENous New Bag 7/21/21 0731)   propofol (DIPRIVAN) 10 mg/mL infusion (50 mcg/kg/min × 81.6 kg IntraVENous Rate Change 7/21/21 0800)   furosemide (LASIX) injection 40 mg (has no administration in time range)   LORazepam (ATIVAN) injection 2 mg (has no administration in time range)   fentaNYL (PF) 900 mcg/30 ml infusion soln (50 mcg/hr IntraVENous New Bag 7/21/21 0800)   cisatracurium (NIMBEX) 100 mg in 0.9% sodium chloride 100 mL infusion (has no administration in time range)   ondansetron (ZOFRAN) injection 4 mg (4 mg IntraVENous Given 7/21/21 0705)   etomidate (AMIDATE) 2 mg/mL injection (30 mg IntraVENous Given 7/21/21 0710)   succinylcholine (ANECTINE) injection (100 mg IntraVENous Given 7/21/21 0710)   midazolam (VERSED) injection 2 mg (2 mg IntraVENous Given 7/21/21 0724)   fentaNYL citrate (PF) injection 100 mcg (100 mcg IntraVENous Given 7/21/21 0724)   propofoL (DIPRIVAN) 10 mg/mL injection 50 mg (50 mg IntraVENous Given 7/21/21 0725)   propofoL (DIPRIVAN) 10 mg/mL injection 50 mg (50 mg IntraVENous Given 7/21/21 0727)   fentaNYL citrate (PF) injection 100 mcg (100 mcg IntraVENous Given 7/21/21 0729)   vecuronium (NORCURON) injection 8.16 mg (8.16 mg IntraVENous Given 7/21/21 0740)   LORazepam (ATIVAN) injection 2 mg (2 mg IntraVENous Given 7/21/21 0738)   propofoL (DIPRIVAN) 10 mg/mL injection 100 mg (100 mg IntraVENous Given 7/21/21 0743)   CHLORHEXIDINE GLUCONATE 0.12 % MOUTHWASH (has no administration in time range)         Medical Decision Making   I am the first provider for this patient. I reviewed the vital signs, available nursing notes, past medical history, past surgical history, family history and social history. Vital Signs-Reviewed the patient's vital signs. Pulse Oximetry Analysis - 48% on room air,hypoxic     Cardiac Monitor:  Rate: 127 bpm  Rhythm: Sinus tachycardia    EKG interpretation: (Preliminary)  EKG read by Dr. Ajit Sanford at 8:04 AM  Sinus tachycardia at a rate of 130 bpm, ND interval 134 ms, QRS duration 80 ms    Records Reviewed: Nursing Notes, Old Medical Records and Previous electrocardiograms    Provider Notes (Medical Decision Making): Gilbert Dc is a 30 y.o. male presenting after overdose and administration of 6 mg of Narcan in the field intranasally. Patient arrived in severe respiratory distress and unable to provide much history. Labs and x-ray and exam consistent with ARDS likely secondary to aspiration and possible pulmonary edema after opiate overdose reversal with Narcan.   Patient required emergent intubation and aggressive oxygenation support including paralysis to achieve adequate oxygenation. Patient met sepsis criteria and broad-spectrum antibiotics to cover for potential aspiration pneumonia initiated. 30 mg/kg cc bolus not administered due to ARDS and evidence of pulmonary edema. Patient evaluated by intensivist and hospitalist in the ICU and accepted for further ICU management. No family has arrived for updates at this time. Procedures:  Intubation    Date/Time: 7/21/2021 7:27 AM  Performed by: Brandon Parada MD  Authorized by: Brandon Parada MD     Consent:     Consent obtained:  Emergent situation  Pre-procedure details:     Patient status:  Altered mental status    Paralytics:  Succinylcholine (Etomidate)  Procedure details:     Preoxygenation: Hi Flow O2. CPR in progress: no      Intubation method:  Oral    Oral intubation technique:  Video-assisted    Laryngoscope blade: Mac 4    Tube size (mm):  7.5    Tube type:  Cuffed    Number of attempts:  1    Cricoid pressure: yes      Tube visualized through cords: yes    Placement assessment:     ETT to teeth:  24    Tube secured with:  ETT hebert    Breath sounds:  Absent over the epigastrium and equal    Placement verification: chest rise, condensation, CXR verification, direct visualization, equal breath sounds, ETCO2 detector and tube exhalation      CXR findings:  ETT in proper place  Post-procedure details:     Patient tolerance of procedure: Tolerated with difficulty  Comments:      Patient remained difficult to oxygenate even post intubation. On chest x-ray has diffuse bilateral infiltrates consistent with flash pulmonary edema and patient has entered ARDS. ED Course:   CONSULT NOTE:   7:34 AM  Dr. Caron Guerrero spoke with Dr. Tom Phillips   Specialty: Intensivist  Discussed pt's hx, disposition, and available diagnostic and imaging results over the telephone. Reviewed care plans.   Start pressure control ventilation with direct pressure of 20, PEEP of 10, rate of 20 and paralyzed patient for presumed ARDS for better oxygenation. 7:50 AM  On removal of patient's underwear some sort of needle and lighter fell out of his underwear. This was handled with gloves and given to security for storage for police. 8:00 AM  Intensivist is at bedside. Patient's oxygenation has improved after paralysis. 8:03 AM  Though the patient meets sepsis criteria, likely secondary from aspiration pneumonia after his overdose. Aggressive IV fluids are contraindicated at this time due to ARDS and pulmonary edema and therefore 30 mg/kg bolus will not be ordered or administered. CONSULT NOTE:   8:09 AM  Dr. Elizabeth Washburn spoke with Dr. Yajaira Romero   Specialty: Hospitalist  Discussed pt's hx, disposition, and available diagnostic and imaging results over the telephone. Reviewed care plans. Accepts to ICU. Diagnosis and Disposition     Critical Care Time: 8:11 AM  I have spent 45 minutes of critical care time involved in lab review, consultations with specialist, family decision-making, and documentation. During this entire length of time I was immediately available to the patient. Critical Care: The reason for providing this level of medical care for this critically ill patient was due a critical illness that impaired one or more vital organ systems such that there was a high probability of imminent or life threatening deterioration in the patients condition. This care involved high complexity decision making to assess, manipulate, and support vital system functions, to treat this degreee vital organ system failure and to prevent further life threatening deterioration of the patients condition. Core Measures:  For Hospitalized Patients:    1. Hospitalization Decision Time:  The decision to hospitalize the patient was made by Dr. Elizabeth Washburn at 6:55 AM on 7/21/2021    2.  Aspirin: Aspirin was not given because the patient did not present with a stroke at the time of their Emergency Department evaluation    7:37 AM  Patient is being admitted to the hospital by Dr. Ellis Ruano. The results of their tests and reasons for their admission have been discussed with them and/or available family. They convey agreement and understanding for the need to be admitted and for their admission diagnosis. CONDITIONS ON ADMISSION:  Sepsis is present at the time of admission. Deep Vein Thrombosis is not present at the time of admission. Thrombosis is not present at the time of admission. Urinary Tract Infection maybe present at the time of admission. Pneumonia is present at the time of admission. MRSA maybe present at the time of admission. Wound infection is not present at the time of admission. Pressure Ulcer is not present at the time of admission. CLINICAL IMPRESSION:    1. Drug overdose, undetermined intent, initial encounter    2. Acute respiratory failure with hypoxia and hypercapnia (HCC)      _______________________________      Please note that this dictation was completed with [employer], the computer voice recognition software. Quite often unanticipated grammatical, syntax, homophones, and other interpretive errors are inadvertently transcribed by the computer software. Please disregard these errors. Please excuse any errors that have escaped final proofreading.

## 2023-11-13 ENCOUNTER — HOSPITAL ENCOUNTER (EMERGENCY)
Facility: HOSPITAL | Age: 38
Discharge: HOME | End: 2023-11-13
Attending: EMERGENCY MEDICINE
Payer: MEDICAID

## 2023-11-13 VITALS
TEMPERATURE: 97.6 F | HEIGHT: 69 IN | SYSTOLIC BLOOD PRESSURE: 102 MMHG | BODY MASS INDEX: 26.66 KG/M2 | DIASTOLIC BLOOD PRESSURE: 51 MMHG | RESPIRATION RATE: 13 BRPM | WEIGHT: 180 LBS | HEART RATE: 71 BPM | OXYGEN SATURATION: 99 %

## 2023-11-13 DIAGNOSIS — T50.901A ACCIDENTAL OVERDOSE, INITIAL ENCOUNTER: Primary | ICD-10-CM

## 2023-11-13 DIAGNOSIS — N17.9 AKI (ACUTE KIDNEY INJURY) (HCC): ICD-10-CM

## 2023-11-13 LAB
ALBUMIN SERPL-MCNC: 3.5 G/DL (ref 3.4–5)
ALBUMIN/GLOB SERPL: 1.1 (ref 0.8–1.7)
ALP SERPL-CCNC: 81 U/L (ref 45–117)
ALT SERPL-CCNC: 177 U/L (ref 16–61)
ANION GAP SERPL CALC-SCNC: 12 MMOL/L (ref 3–18)
APAP SERPL-MCNC: <2 UG/ML (ref 10–30)
AST SERPL-CCNC: 173 U/L (ref 10–38)
BASOPHILS # BLD: 0 K/UL (ref 0–0.1)
BASOPHILS NFR BLD: 0 % (ref 0–2)
BILIRUB SERPL-MCNC: 0.9 MG/DL (ref 0.2–1)
BUN SERPL-MCNC: 18 MG/DL (ref 7–18)
BUN/CREAT SERPL: 12 (ref 12–20)
CALCIUM SERPL-MCNC: 8.5 MG/DL (ref 8.5–10.1)
CHLORIDE SERPL-SCNC: 106 MMOL/L (ref 100–111)
CO2 SERPL-SCNC: 22 MMOL/L (ref 21–32)
CREAT SERPL-MCNC: 1.5 MG/DL (ref 0.6–1.3)
DIFFERENTIAL METHOD BLD: ABNORMAL
EOSINOPHIL # BLD: 0 K/UL (ref 0–0.4)
EOSINOPHIL NFR BLD: 1 % (ref 0–5)
ERYTHROCYTE [DISTWIDTH] IN BLOOD BY AUTOMATED COUNT: 11.6 % (ref 11.6–14.5)
ETHANOL SERPL-MCNC: 7 MG/DL (ref 0–3)
GLOBULIN SER CALC-MCNC: 3.3 G/DL (ref 2–4)
GLUCOSE BLD STRIP.AUTO-MCNC: 165 MG/DL (ref 70–110)
GLUCOSE SERPL-MCNC: 143 MG/DL (ref 74–99)
HCT VFR BLD AUTO: 38.9 % (ref 36–48)
HGB BLD-MCNC: 13 G/DL (ref 13–16)
IMM GRANULOCYTES # BLD AUTO: 0.1 K/UL (ref 0–0.04)
IMM GRANULOCYTES NFR BLD AUTO: 1 % (ref 0–0.5)
LYMPHOCYTES # BLD: 0.4 K/UL (ref 0.9–3.6)
LYMPHOCYTES NFR BLD: 8 % (ref 21–52)
MAGNESIUM SERPL-MCNC: 2.5 MG/DL (ref 1.6–2.6)
MCH RBC QN AUTO: 28.9 PG (ref 24–34)
MCHC RBC AUTO-ENTMCNC: 33.4 G/DL (ref 31–37)
MCV RBC AUTO: 86.4 FL (ref 78–100)
MONOCYTES # BLD: 0.2 K/UL (ref 0.05–1.2)
MONOCYTES NFR BLD: 4 % (ref 3–10)
NEUTS SEG # BLD: 4.4 K/UL (ref 1.8–8)
NEUTS SEG NFR BLD: 87 % (ref 40–73)
NRBC # BLD: 0 K/UL (ref 0–0.01)
NRBC BLD-RTO: 0 PER 100 WBC
PLATELET # BLD AUTO: 164 K/UL (ref 135–420)
PMV BLD AUTO: 10.5 FL (ref 9.2–11.8)
POTASSIUM SERPL-SCNC: 3.7 MMOL/L (ref 3.5–5.5)
PROT SERPL-MCNC: 6.8 G/DL (ref 6.4–8.2)
RBC # BLD AUTO: 4.5 M/UL (ref 4.35–5.65)
SALICYLATES SERPL-MCNC: <1.7 MG/DL (ref 2.8–20)
SODIUM SERPL-SCNC: 140 MMOL/L (ref 136–145)
WBC # BLD AUTO: 5 K/UL (ref 4.6–13.2)

## 2023-11-13 PROCEDURE — 99284 EMERGENCY DEPT VISIT MOD MDM: CPT

## 2023-11-13 PROCEDURE — 85025 COMPLETE CBC W/AUTO DIFF WBC: CPT

## 2023-11-13 PROCEDURE — 80053 COMPREHEN METABOLIC PANEL: CPT

## 2023-11-13 PROCEDURE — 82962 GLUCOSE BLOOD TEST: CPT

## 2023-11-13 PROCEDURE — 82077 ASSAY SPEC XCP UR&BREATH IA: CPT

## 2023-11-13 PROCEDURE — 2580000003 HC RX 258: Performed by: EMERGENCY MEDICINE

## 2023-11-13 PROCEDURE — 80179 DRUG ASSAY SALICYLATE: CPT

## 2023-11-13 PROCEDURE — 83735 ASSAY OF MAGNESIUM: CPT

## 2023-11-13 PROCEDURE — 80143 DRUG ASSAY ACETAMINOPHEN: CPT

## 2023-11-13 RX ORDER — 0.9 % SODIUM CHLORIDE 0.9 %
2000 INTRAVENOUS SOLUTION INTRAVENOUS ONCE
Status: COMPLETED | OUTPATIENT
Start: 2023-11-13 | End: 2023-11-13

## 2023-11-13 RX ADMIN — SODIUM CHLORIDE 2000 ML: 9 INJECTION, SOLUTION INTRAVENOUS at 08:52

## 2023-11-13 NOTE — ED PROVIDER NOTES
following components:    Glucose 143 (*)     Creatinine 1.50 (*)      (*)      (*)     All other components within normal limits   SALICYLATE LEVEL - Abnormal; Notable for the following components:    Salicylate, Serum <1.2 (*)     All other components within normal limits   ACETAMINOPHEN LEVEL - Abnormal; Notable for the following components:    Acetaminophen Level <2 (*)     All other components within normal limits   ETHANOL - Abnormal; Notable for the following components:    Ethanol Lvl 7 (*)     All other components within normal limits   POCT GLUCOSE - Abnormal; Notable for the following components:    POC Glucose 165 (*)     All other components within normal limits   MAGNESIUM   URINE DRUG SCREEN       All other labs were within normal range or not returned as of this dictation. EMERGENCY DEPARTMENT COURSE and DIFFERENTIAL DIAGNOSIS/MDM:   Vitals:    Vitals:    11/13/23 1000 11/13/23 1030 11/13/23 1045 11/13/23 1130   BP: (!) 101/53 (!) 93/46  (!) 102/51   Pulse: 82 80 79 71   Resp: 11 13 14 13   Temp:       TempSrc:       SpO2: 99% 99%  99%   Weight:       Height:               Medical Decision Making  Patient is up alert and oriented. He is called family members to bring him home. Patient is comfortable being discharged. He has no complaints. Differential diagnosis drug overdose suicidal intention alcohol intoxication psychiatric illness    Amount and/or Complexity of Data Reviewed  Labs: ordered. ECG/medicine tests: ordered. Risk  Prescription drug management. REASSESSMENT          CRITICAL CARE TIME   Total Critical Care time was  minutes, excluding separately reportable procedures. There was a high probability of clinically significant/life threatening deterioration in the patient's condition which required my urgent intervention. CONSULTS:  None    PROCEDURES:  Unless otherwise noted below, none     Procedures        FINAL IMPRESSION      1.  Accidental

## 2023-11-13 NOTE — ED NOTES
Patient denies any thoughts of self harm states he doesn't want kill himself and this was an accident has complaints of right ankle pain but no deformity seen     Toño Clements RN  11/13/23 1540

## 2023-11-13 NOTE — ED NOTES
Attempted to call family member on file no answer, PO challenge in progress food and drink were provided to patient     Rajinder Zamora RN  11/13/23 7196

## 2023-11-13 NOTE — ED PROVIDER NOTES
following components:    Glucose 143 (*)     Creatinine 1.50 (*)      (*)      (*)     All other components within normal limits   SALICYLATE LEVEL - Abnormal; Notable for the following components:    Salicylate, Serum <4.1 (*)     All other components within normal limits   ACETAMINOPHEN LEVEL - Abnormal; Notable for the following components:    Acetaminophen Level <2 (*)     All other components within normal limits   ETHANOL - Abnormal; Notable for the following components:    Ethanol Lvl 7 (*)     All other components within normal limits   POCT GLUCOSE - Abnormal; Notable for the following components:    POC Glucose 165 (*)     All other components within normal limits   MAGNESIUM   URINE DRUG SCREEN       All other labs were within normal range or not returned as of this dictation. EMERGENCY DEPARTMENT COURSE and DIFFERENTIAL DIAGNOSIS/MDM:   Vitals:    Vitals:    11/13/23 1000 11/13/23 1030 11/13/23 1045 11/13/23 1130   BP: (!) 101/53 (!) 93/46  (!) 102/51   Pulse: 82 80 79 71   Resp: 11 13 14 13   Temp:       TempSrc:       SpO2: 99% 99%  99%   Weight:       Height:               Medical Decision Making  Patient is up alert and oriented. He is called family members to bring him home. Patient is comfortable being discharged. He has no complaints. Differential diagnosis drug overdose suicidal intention alcohol intoxication psychiatric illness    First EKG showed accelerated junctional rhythm with a prolonged QT at 683. Second EKG showed normal sinus rhythm at 91 normal axis normal intervals QTc was 457 much improved. Will discharge patient home. Amount and/or Complexity of Data Reviewed  Labs: ordered. ECG/medicine tests: ordered. Risk  Prescription drug management. REASSESSMENT          CRITICAL CARE TIME   Total Critical Care time was  minutes, excluding separately reportable procedures.   There was a high probability of clinically significant/life threatening

## 2023-11-13 NOTE — ED NOTES
Patient is a;lert and oriented x 4 PO challenge successful patient ate and drank, He walks with a steady gait without assistance and is going to wait in the lobby for his ride.  This plan was discussed with provider and charge nurse     Mila Maravilla RN  11/13/23 8982

## 2023-11-13 NOTE — ED NOTES
Patient awakens when spoken to otherwise he is resting in bed in no distress vitals are stable and familyu is at bedside chest rises and falls equally      Dang Kelley RN  11/13/23 1297

## 2023-11-19 LAB
EKG ATRIAL RATE: 91 BPM
EKG DIAGNOSIS: NORMAL
EKG DIAGNOSIS: NORMAL
EKG P AXIS: 38 DEGREES
EKG P-R INTERVAL: 156 MS
EKG Q-T INTERVAL: 372 MS
EKG Q-T INTERVAL: 524 MS
EKG QRS DURATION: 102 MS
EKG QRS DURATION: 88 MS
EKG QTC CALCULATION (BAZETT): 457 MS
EKG QTC CALCULATION (BAZETT): 682 MS
EKG R AXIS: 41 DEGREES
EKG R AXIS: 86 DEGREES
EKG T AXIS: 37 DEGREES
EKG T AXIS: 79 DEGREES
EKG VENTRICULAR RATE: 102 BPM
EKG VENTRICULAR RATE: 91 BPM

## 2023-11-25 ASSESSMENT — ENCOUNTER SYMPTOMS
GASTROINTESTINAL NEGATIVE: 1
SHORTNESS OF BREATH: 0

## 2023-12-16 ENCOUNTER — HOSPITAL ENCOUNTER (EMERGENCY)
Facility: HOSPITAL | Age: 38
Discharge: HOME | End: 2023-12-16
Attending: STUDENT IN AN ORGANIZED HEALTH CARE EDUCATION/TRAINING PROGRAM

## 2023-12-16 VITALS
TEMPERATURE: 97.8 F | DIASTOLIC BLOOD PRESSURE: 51 MMHG | HEART RATE: 81 BPM | OXYGEN SATURATION: 95 % | WEIGHT: 140 LBS | RESPIRATION RATE: 12 BRPM | BODY MASS INDEX: 22.5 KG/M2 | HEIGHT: 66 IN | SYSTOLIC BLOOD PRESSURE: 95 MMHG

## 2023-12-16 DIAGNOSIS — T40.601A OPIATE OVERDOSE, ACCIDENTAL OR UNINTENTIONAL, INITIAL ENCOUNTER (HCC): Primary | ICD-10-CM

## 2023-12-16 LAB
ANION GAP SERPL CALC-SCNC: 8 MMOL/L (ref 3–18)
BUN SERPL-MCNC: 20 MG/DL (ref 7–18)
BUN/CREAT SERPL: 17 (ref 12–20)
CALCIUM SERPL-MCNC: 8.6 MG/DL (ref 8.5–10.1)
CHLORIDE SERPL-SCNC: 107 MMOL/L (ref 100–111)
CO2 SERPL-SCNC: 26 MMOL/L (ref 21–32)
CREAT SERPL-MCNC: 1.21 MG/DL (ref 0.6–1.3)
EKG ATRIAL RATE: 104 BPM
EKG DIAGNOSIS: NORMAL
EKG P AXIS: 63 DEGREES
EKG P-R INTERVAL: 162 MS
EKG Q-T INTERVAL: 370 MS
EKG QRS DURATION: 92 MS
EKG QTC CALCULATION (BAZETT): 486 MS
EKG R AXIS: 72 DEGREES
EKG T AXIS: 55 DEGREES
EKG VENTRICULAR RATE: 104 BPM
GLUCOSE SERPL-MCNC: 99 MG/DL (ref 74–99)
MAGNESIUM SERPL-MCNC: 2.1 MG/DL (ref 1.6–2.6)
POTASSIUM SERPL-SCNC: 3.9 MMOL/L (ref 3.5–5.5)
SODIUM SERPL-SCNC: 141 MMOL/L (ref 136–145)

## 2023-12-16 PROCEDURE — 2580000003 HC RX 258: Performed by: STUDENT IN AN ORGANIZED HEALTH CARE EDUCATION/TRAINING PROGRAM

## 2023-12-16 PROCEDURE — 83735 ASSAY OF MAGNESIUM: CPT

## 2023-12-16 PROCEDURE — 80048 BASIC METABOLIC PNL TOTAL CA: CPT

## 2023-12-16 PROCEDURE — 93005 ELECTROCARDIOGRAM TRACING: CPT | Performed by: STUDENT IN AN ORGANIZED HEALTH CARE EDUCATION/TRAINING PROGRAM

## 2023-12-16 PROCEDURE — 93010 ELECTROCARDIOGRAM REPORT: CPT | Performed by: INTERNAL MEDICINE

## 2023-12-16 RX ORDER — NALOXONE HYDROCHLORIDE 4 MG/.1ML
1 SPRAY NASAL PRN
Qty: 1 EACH | Refills: 0 | Status: SHIPPED | OUTPATIENT
Start: 2023-12-16

## 2023-12-16 RX ORDER — 0.9 % SODIUM CHLORIDE 0.9 %
1000 INTRAVENOUS SOLUTION INTRAVENOUS ONCE
Status: COMPLETED | OUTPATIENT
Start: 2023-12-16 | End: 2023-12-16

## 2023-12-16 RX ORDER — QUETIAPINE 150 MG/1
100 TABLET, FILM COATED, EXTENDED RELEASE ORAL DAILY
COMMUNITY

## 2023-12-16 RX ADMIN — SODIUM CHLORIDE 1000 ML: 9 INJECTION, SOLUTION INTRAVENOUS at 12:20

## 2023-12-16 RX ADMIN — SODIUM CHLORIDE 1000 ML: 9 INJECTION, SOLUTION INTRAVENOUS at 10:38

## 2023-12-16 ASSESSMENT — PAIN - FUNCTIONAL ASSESSMENT: PAIN_FUNCTIONAL_ASSESSMENT: NONE - DENIES PAIN

## 2023-12-16 NOTE — ED NOTES
Pt walked from stretcher to hallway. Gait steady. Sts he still feels groggy. Mother in room.       [employer], RN  12/16/23 0641

## 2023-12-16 NOTE — ED PROVIDER NOTES
[facility] EMERGENCY DEPT  EMERGENCY DEPARTMENT ENCOUNTER    Patient Name: Arthur Brunner  MRN: [id number]  YOB: 1985  Provider: Aury Zambrano DO  PCP: Roscoe Ryder DO   Time/Date of evaluation: 10:40 AM EST on 12/16/23    History of Presenting Illness     History Provided by: Patient  History is limited by: Nothing     HISTORY:   Arthur Brunner is a 41 y.o. male history of substance abuse presented hospital today for overdose. Patient stated that he is visiting from [place] he is visiting his father at this time. However he did call his father to pick him up. Father found him to be overdosed. EMS was called.  on scene was doing chest compressions when EMS had arrived. However EMS did reassess patient patient did have a pulse. Patient was given 1 mg of intranasal Narcan on scene. EMS gave home 2 doses of 2 mg of IV Narcan. His respiration has improved. 4 mg IV Norco Zofran was given as well. Patient did admit to using cocaine prior to this event. He denies any pain anywhere denies any head injury. Patient states that he feels fine at this time. No nausea. Nursing Notes were all reviewed and agreed with or any disagreements were addressed in the HPI. Past History     PAST MEDICAL HISTORY:  Past Medical History:   Diagnosis Date    Drug abuse, IV ([address])     Heroin abuse ([address])     Liver disease     Hepatitis C - treated    Psychiatric diagnosis     drug abuse. Psychiatric diagnosis     ADHD    Psychiatric disorder     anxiety    Seizures ([address])     related to substance withdrawal    Sleep apnea     no cpap - not formally diagnosed       PAST SURGICAL HISTORY:  No past surgical history on file.     FAMILY HISTORY:  Family History   Problem Relation Age of Onset    No Known Problems Mother     Heart Disease Father        SOCIAL HISTORY:  Social History     Tobacco Use    Smoking status: Every Day     Current packs/day: 1.00     Types: Cigarettes Smokeless tobacco: Never   Substance Use Topics    Alcohol use: Yes    Drug use: Yes     Types: Heroin, Prescription, Cocaine       MEDICATIONS:  Current Facility-Administered Medications   Medication Dose Route Frequency Provider Last Rate Last Admin    sodium chloride 0.9 % bolus 1,000 mL  1,000 mL IntraVENous Once Sigrid Jernigan D, .9 mL/hr at 12/16/23 1038 1,000 mL at 12/16/23 1038     Current Outpatient Medications   Medication Sig Dispense Refill    ALPRAZolam (XANAX) 2 MG tablet Take by mouth 3 times daily as needed. amphetamine-dextroamphetamine (ADDERALL XR) 20 MG extended release capsule Take 20 mg by mouth 3 times daily. gabapentin (NEURONTIN) 300 MG capsule Take 300 mg by mouth 3 times daily. methadone (METHADOSE) 40 MG disintegrating tablet Take 40 mg by mouth daily. ALLERGIES:  No Known Allergies    SOCIAL DETERMINANTS OF HEALTH:  Social Determinants of Health     Tobacco Use: High Risk (9/30/2022)    Patient History     Smoking Tobacco Use: Every Day     Smokeless Tobacco Use: Never     Passive Exposure: Not on file   Alcohol Use: Not on file   Financial Resource Strain: Not on file   Food Insecurity: Not on file   Transportation Needs: Not on file   Physical Activity: Not on file   Stress: Not on file   Social Connections: Not on file   Intimate Partner Violence: Not on file   Depression: Not on file   Housing Stability: Not on file   Interpersonal Safety: Not on file   Utilities: Not on file       Review of Systems     Negative except as listed above in HPI. Physical Exam   There were no vitals filed for this visit. General: Pleasant, no distress, interacting appropriately   Head: Normacephalic, atraumatic  ENT: oral mucosa moist, neck supple, no tracheal deviation  Cardiovascular: Tachycardic . rate, regular rhythm, no murmurs, rubbing, gallops  Respiratory: CTAB, no wheeze, rales, rhonchi  Gastrointestinal: Soft, non distended, non tender, non

## 2023-12-16 NOTE — DISCHARGE INSTRUCTIONS
Please follow your substance abuse clinic. I will plan to prescribe Narcan for you. Please avoid any drug usage the future.     I suspect you likely had an opioid overdose

## 2024-03-27 ENCOUNTER — HOSPITAL ENCOUNTER (EMERGENCY)
Facility: HOSPITAL | Age: 39
Discharge: HOME | End: 2024-03-27
Payer: COMMERCIAL

## 2024-03-27 VITALS
DIASTOLIC BLOOD PRESSURE: 76 MMHG | BODY MASS INDEX: 28.12 KG/M2 | OXYGEN SATURATION: 100 % | SYSTOLIC BLOOD PRESSURE: 132 MMHG | RESPIRATION RATE: 16 BRPM | HEART RATE: 94 BPM | TEMPERATURE: 98.6 F | WEIGHT: 175 LBS | HEIGHT: 66 IN

## 2024-03-27 DIAGNOSIS — N34.2 URETHRITIS: ICD-10-CM

## 2024-03-27 DIAGNOSIS — H60.322 ACUTE HEMORRHAGIC OTITIS EXTERNA OF LEFT EAR: Primary | ICD-10-CM

## 2024-03-27 PROCEDURE — 99284 EMERGENCY DEPT VISIT MOD MDM: CPT

## 2024-03-27 PROCEDURE — 87491 CHLMYD TRACH DNA AMP PROBE: CPT

## 2024-03-27 PROCEDURE — 6360000002 HC RX W HCPCS: Performed by: EMERGENCY MEDICINE

## 2024-03-27 PROCEDURE — 87591 N.GONORRHOEAE DNA AMP PROB: CPT

## 2024-03-27 PROCEDURE — 87661 TRICHOMONAS VAGINALIS AMPLIF: CPT

## 2024-03-27 PROCEDURE — 96372 THER/PROPH/DIAG INJ SC/IM: CPT

## 2024-03-27 PROCEDURE — 2500000003 HC RX 250 WO HCPCS: Performed by: EMERGENCY MEDICINE

## 2024-03-27 RX ORDER — AMOXICILLIN 875 MG/1
875 TABLET, COATED ORAL 2 TIMES DAILY
Qty: 20 TABLET | Refills: 0 | Status: SHIPPED | OUTPATIENT
Start: 2024-03-27 | End: 2024-04-06

## 2024-03-27 RX ORDER — CIPROFLOXACIN AND DEXAMETHASONE 3; 1 MG/ML; MG/ML
4 SUSPENSION/ DROPS AURICULAR (OTIC) 2 TIMES DAILY
Qty: 7.5 ML | Refills: 0 | Status: SHIPPED | OUTPATIENT
Start: 2024-03-27 | End: 2024-04-03

## 2024-03-27 RX ORDER — IBUPROFEN 800 MG/1
800 TABLET ORAL 2 TIMES DAILY PRN
Qty: 20 TABLET | Refills: 1 | Status: SHIPPED | OUTPATIENT
Start: 2024-03-27

## 2024-03-27 RX ADMIN — LIDOCAINE HYDROCHLORIDE 500 MG: 10 INJECTION, SOLUTION EPIDURAL; INFILTRATION; INTRACAUDAL; PERINEURAL at 17:47

## 2024-03-27 ASSESSMENT — PAIN DESCRIPTION - LOCATION: LOCATION: EAR

## 2024-03-27 ASSESSMENT — PAIN SCALES - GENERAL: PAINLEVEL_OUTOF10: 6

## 2024-03-27 ASSESSMENT — PAIN DESCRIPTION - ORIENTATION: ORIENTATION: LEFT

## 2024-03-27 ASSESSMENT — PAIN - FUNCTIONAL ASSESSMENT: PAIN_FUNCTIONAL_ASSESSMENT: 0-10

## 2024-03-27 NOTE — ED TRIAGE NOTES
Patient reports he is having left ear states he put a q tip in thre he started having bleeding and hurting

## 2024-03-27 NOTE — ED PROVIDER NOTES
abscess with Ciprodex and amoxicillin have him follow-up with ENT.  Also will take a GC trick urine sample for testing and advised patient to abstain from intercourse for 2 weeks give Rocephin shot here consider treating based on test results later and advised patient to follow-up with the health department for routine testing for HIV syphilis and hepatitis and to inform partners for reason for visit.        Medical Decision Making  Risk  Prescription drug management.            REASSESSMENT      FINAL IMPRESSION      1. Acute hemorrhagic otitis externa of left ear    2. Urethritis          DISPOSITION/PLAN   DISPOSITION Decision To Discharge 03/27/2024 05:39:16 PM      PATIENT REFERRED TO:  Jai Bergman MD  [address] [facility] [address]  [phone]          Alvin Browning DO  [address] [facility]  [address]  [facility] [id number] [phone]          [address] [id number] [phone]          DISCHARGE MEDICATIONS:  New Prescriptions    AMOXICILLIN (AMOXIL) 875 MG TABLET    Take 1 tablet by mouth 2 times daily for 10 days    CIPROFLOXACIN-DEXAMETHASONE (CIPRODEX) 0.3-0.1 % OTIC SUSPENSION    Place 4 drops into the left ear 2 times daily for 7 days    IBUPROFEN (ADVIL;MOTRIN) 800 MG TABLET    Take 1 tablet by mouth 2 times daily as needed for Pain     Controlled Substances Monitoring:          No data to display                (Please note that portions of this note were completed with a voice recognition program.  Efforts were made to edit the dictations but occasionally words are mis-transcribed.)    BENI Mckeon (electronically signed)  Attending Emergency Physician           Donita Felix PA  03/27/24 2181

## 2024-03-29 LAB
C TRACH RRNA SPEC QL NAA+PROBE: POSITIVE
N GONORRHOEA RRNA SPEC QL NAA+PROBE: NEGATIVE
SPECIMEN SOURCE: ABNORMAL
T VAGINALIS RRNA SPEC QL NAA+PROBE: NEGATIVE

## 2024-03-30 RX ORDER — DOXYCYCLINE HYCLATE 100 MG
100 TABLET ORAL 2 TIMES DAILY
Qty: 14 TABLET | Refills: 0 | Status: SHIPPED | OUTPATIENT
Start: 2024-03-30 | End: 2024-04-06

## 2024-03-30 NOTE — PROGRESS NOTES
Chlamydia +  Pt called and made aware  Doxycycline sent to pharmacy of choice  Pt aware abstain from sex, take all antibiotics, alert sexual partners and follow up health dept 1 week to ensure clearance, and discuss syphilis, HIV testing

## 2025-03-18 NOTE — ED NOTES
Patient called ED yelling in phone requesting information on dx, and wanted to know \"who made the decision to send me home with no pain medication. \" Patient c/o finger ignacia r/t laceration. This RN explained to patient that no medical advice can be given over the phone. Explained that he is more than welcome to come in for further evaluation.  Patient thanked this RN and hung up RN sent patient message in [facility]. Please see message.

## (undated) DEVICE — DRAPE,ANGIO,BRACH,STERILE,38X44: Brand: MEDLINE

## (undated) DEVICE — PACK PROCEDURE SURG CATH CUST

## (undated) DEVICE — TORCON NB, ADVANTAGE CATHETER: Brand: TORCON NB

## (undated) DEVICE — TUBING PRSS MON L24IN PVC RIG NONEXPANDING M TO FEM CONN

## (undated) DEVICE — PROCEDURE KIT FLUID MGMT 10 FR CUST MAINFOLD

## (undated) DEVICE — SHIELD RAD 14X16 IN W/ SCOOP ABSORBER

## (undated) DEVICE — PRESSURE MONITORING SET: Brand: TRUWAVE

## (undated) DEVICE — Device

## (undated) DEVICE — STOPCOCK TRNSDUC 500PSI 3 W ROT M LUER LT BLU OFF HNDL R

## (undated) DEVICE — SENSOR PLSE OXMTR AD CBL L36IN ADH FRM FIT SPO2 DISP

## (undated) DEVICE — CATH DIAG FR4 EXPO 5FRX100CM -- EXPO

## (undated) DEVICE — GLIDESHEATH SLENDER STAINLESS STEEL KIT: Brand: GLIDESHEATH SLENDER

## (undated) DEVICE — BAND RADIAL COMPR ARTERY 24CM -- REG BX/10

## (undated) DEVICE — GUIDEWIRE VASC L260CM DIA0.035IN RAD 3MM J TIP L7CM PTFE